# Patient Record
Sex: MALE | Race: WHITE | NOT HISPANIC OR LATINO | Employment: OTHER | URBAN - NONMETROPOLITAN AREA
[De-identification: names, ages, dates, MRNs, and addresses within clinical notes are randomized per-mention and may not be internally consistent; named-entity substitution may affect disease eponyms.]

---

## 2017-02-15 ENCOUNTER — APPOINTMENT (OUTPATIENT)
Dept: GENERAL RADIOLOGY | Facility: HOSPITAL | Age: 67
End: 2017-02-15
Attending: FAMILY MEDICINE

## 2017-02-15 PROCEDURE — 71020 HC CHEST PA AND LATERAL: CPT

## 2017-11-14 ENCOUNTER — TRANSCRIBE ORDERS (OUTPATIENT)
Dept: ADMINISTRATIVE | Facility: HOSPITAL | Age: 67
End: 2017-11-14

## 2017-11-14 DIAGNOSIS — R06.09 DYSPNEA ON EXERTION: Primary | ICD-10-CM

## 2017-11-20 ENCOUNTER — HOSPITAL ENCOUNTER (OUTPATIENT)
Dept: PULMONOLOGY | Facility: HOSPITAL | Age: 67
Discharge: HOME | End: 2017-11-20
Attending: FAMILY MEDICINE | Admitting: FAMILY MEDICINE

## 2017-11-20 DIAGNOSIS — R06.09 DYSPNEA ON EXERTION: ICD-10-CM

## 2017-11-20 LAB
ARTERIAL PATENCY WRIST A: POSITIVE
ATMOSPHERIC PRESS: 754 MMHG
BASE EXCESS BLDA CALC-SCNC: -2.2 MMOL/L (ref 0–2)
BDY SITE: ABNORMAL
BODY TEMPERATURE: 37 C
HCO3 BLDA-SCNC: 22.7 MMOL/L (ref 20–26)
Lab: ABNORMAL
MODALITY: ABNORMAL
PCO2 BLDA: 38.7 MM HG (ref 35–45)
PH BLDA: 7.38 PH UNITS (ref 7.35–7.45)
PO2 BLDA: 80.2 MM HG (ref 83–108)
SAO2 % BLDCOA: 93.9 % (ref 94–99)
VENTILATOR MODE: ABNORMAL

## 2017-11-20 PROCEDURE — 82803 BLOOD GASES ANY COMBINATION: CPT

## 2017-11-20 PROCEDURE — A9270 NON-COVERED ITEM OR SERVICE: HCPCS | Performed by: FAMILY MEDICINE

## 2017-11-20 PROCEDURE — 36600 WITHDRAWAL OF ARTERIAL BLOOD: CPT

## 2017-11-20 PROCEDURE — 94060 EVALUATION OF WHEEZING: CPT

## 2017-11-20 PROCEDURE — 63710000001 ALBUTEROL PER 1 MG: Performed by: FAMILY MEDICINE

## 2017-11-20 PROCEDURE — 94727 GAS DIL/WSHOT DETER LNG VOL: CPT

## 2017-11-20 PROCEDURE — 94729 DIFFUSING CAPACITY: CPT

## 2017-11-20 RX ORDER — ALBUTEROL SULFATE 2.5 MG/3ML
2.5 SOLUTION RESPIRATORY (INHALATION) ONCE
Status: COMPLETED | OUTPATIENT
Start: 2017-11-20 | End: 2017-11-20

## 2017-11-20 RX ADMIN — ALBUTEROL SULFATE 2.5 MG: 2.5 SOLUTION RESPIRATORY (INHALATION) at 14:15

## 2020-07-07 ENCOUNTER — TELEPHONE (OUTPATIENT)
Dept: UROLOGY | Age: 70
End: 2020-07-07

## 2020-07-07 NOTE — TELEPHONE ENCOUNTER
Rec referral from Dr. Curtis Camargo office. Called patient and he preferred it be sent over to [facility] as he does not like [place]. I have called and informed PCP's office of this today.

## 2020-07-22 ENCOUNTER — OFFICE VISIT (OUTPATIENT)
Dept: UROLOGY | Facility: CLINIC | Age: 70
End: 2020-07-22

## 2020-07-22 VITALS — HEIGHT: 70 IN | WEIGHT: 207.4 LBS | BODY MASS INDEX: 29.69 KG/M2 | TEMPERATURE: 97.4 F

## 2020-07-22 DIAGNOSIS — N13.8 BPH WITH URINARY OBSTRUCTION: ICD-10-CM

## 2020-07-22 DIAGNOSIS — N40.1 BPH WITH URINARY OBSTRUCTION: ICD-10-CM

## 2020-07-22 DIAGNOSIS — R97.20 ELEVATED PSA: Primary | ICD-10-CM

## 2020-07-22 LAB
BILIRUB BLD-MCNC: NEGATIVE MG/DL
CLARITY, POC: CLEAR
COLOR UR: YELLOW
GLUCOSE UR STRIP-MCNC: ABNORMAL MG/DL
KETONES UR QL: NEGATIVE
LEUKOCYTE EST, POC: NEGATIVE
NITRITE UR-MCNC: NEGATIVE MG/ML
PH UR: 5 [PH] (ref 5–8)
PROT UR STRIP-MCNC: NEGATIVE MG/DL
RBC # UR STRIP: NEGATIVE /UL
SP GR UR: 1.01 (ref 1–1.03)
UROBILINOGEN UR QL: NORMAL

## 2020-07-22 PROCEDURE — 99204 OFFICE O/P NEW MOD 45 MIN: CPT | Performed by: UROLOGY

## 2020-07-22 PROCEDURE — 81003 URINALYSIS AUTO W/O SCOPE: CPT | Performed by: UROLOGY

## 2020-07-22 RX ORDER — CEPHALEXIN 500 MG/1
500 CAPSULE ORAL 3 TIMES DAILY
Qty: 9 CAPSULE | Refills: 0 | Status: SHIPPED | OUTPATIENT
Start: 2020-07-22 | End: 2020-07-25

## 2020-07-31 ENCOUNTER — PROCEDURE VISIT (OUTPATIENT)
Dept: UROLOGY | Facility: CLINIC | Age: 70
End: 2020-07-31

## 2020-07-31 DIAGNOSIS — R97.20 ELEVATED PSA: Primary | ICD-10-CM

## 2020-07-31 PROCEDURE — 88342 IMHCHEM/IMCYTCHM 1ST ANTB: CPT | Performed by: UROLOGY

## 2020-07-31 PROCEDURE — 55700 PR PROSTATE NEEDLE BIOPSY ANY APPROACH: CPT | Performed by: UROLOGY

## 2020-07-31 PROCEDURE — G0416 PROSTATE BIOPSY, ANY MTHD: HCPCS | Performed by: UROLOGY

## 2020-07-31 PROCEDURE — 96372 THER/PROPH/DIAG INJ SC/IM: CPT | Performed by: UROLOGY

## 2020-07-31 PROCEDURE — 76942 ECHO GUIDE FOR BIOPSY: CPT | Performed by: UROLOGY

## 2020-07-31 RX ORDER — GENTAMICIN SULFATE 40 MG/ML
80 INJECTION, SOLUTION INTRAMUSCULAR; INTRAVENOUS ONCE
Status: COMPLETED | OUTPATIENT
Start: 2020-07-31 | End: 2020-07-31

## 2020-07-31 RX ADMIN — GENTAMICIN SULFATE 80 MG: 40 INJECTION, SOLUTION INTRAMUSCULAR; INTRAVENOUS at 07:34

## 2020-08-12 ENCOUNTER — TELEPHONE (OUTPATIENT)
Dept: UROLOGY | Facility: CLINIC | Age: 70
End: 2020-08-12

## 2020-08-19 ENCOUNTER — OFFICE VISIT (OUTPATIENT)
Dept: UROLOGY | Facility: CLINIC | Age: 70
End: 2020-08-19

## 2020-08-19 VITALS — BODY MASS INDEX: 29.69 KG/M2 | HEIGHT: 70 IN | TEMPERATURE: 97.4 F | WEIGHT: 207.4 LBS

## 2020-08-19 DIAGNOSIS — C61 PROSTATE CANCER (HCC): Primary | ICD-10-CM

## 2020-08-19 DIAGNOSIS — N13.8 BPH WITH URINARY OBSTRUCTION: ICD-10-CM

## 2020-08-19 DIAGNOSIS — N40.1 BPH WITH URINARY OBSTRUCTION: ICD-10-CM

## 2020-08-19 PROCEDURE — 99215 OFFICE O/P EST HI 40 MIN: CPT | Performed by: UROLOGY

## 2020-09-10 LAB
CYTO UR: NORMAL
LAB AP CASE REPORT: NORMAL
LAB AP INTRADEPARTMENTAL CONSULT: NORMAL
PATH REPORT.FINAL DX SPEC: NORMAL
PATH REPORT.GROSS SPEC: NORMAL

## 2021-04-01 ENCOUNTER — OFFICE VISIT (OUTPATIENT)
Dept: UROLOGY | Facility: CLINIC | Age: 71
End: 2021-04-01

## 2021-04-01 VITALS — TEMPERATURE: 97.8 F | WEIGHT: 211.4 LBS | BODY MASS INDEX: 30.26 KG/M2 | HEIGHT: 70 IN

## 2021-04-01 DIAGNOSIS — C61 PROSTATE CANCER (HCC): Primary | ICD-10-CM

## 2021-04-01 DIAGNOSIS — N40.1 BPH WITH URINARY OBSTRUCTION: ICD-10-CM

## 2021-04-01 DIAGNOSIS — N13.8 BPH WITH URINARY OBSTRUCTION: ICD-10-CM

## 2021-04-01 DIAGNOSIS — N52.9 IMPOTENCE OF ORGANIC ORIGIN: ICD-10-CM

## 2021-04-01 LAB
BILIRUB BLD-MCNC: NEGATIVE MG/DL
CLARITY, POC: CLEAR
COLOR UR: YELLOW
GLUCOSE UR STRIP-MCNC: NEGATIVE MG/DL
KETONES UR QL: NEGATIVE
LEUKOCYTE EST, POC: NEGATIVE
NITRITE UR-MCNC: NEGATIVE MG/ML
PH UR: 6 [PH] (ref 5–8)
PROT UR STRIP-MCNC: NEGATIVE MG/DL
RBC # UR STRIP: NEGATIVE /UL
SP GR UR: 1.02 (ref 1–1.03)
UROBILINOGEN UR QL: NORMAL

## 2021-04-01 PROCEDURE — 99214 OFFICE O/P EST MOD 30 MIN: CPT | Performed by: UROLOGY

## 2021-04-01 PROCEDURE — 81003 URINALYSIS AUTO W/O SCOPE: CPT | Performed by: UROLOGY

## 2021-04-01 RX ORDER — ALPRAZOLAM 1 MG/1
1 TABLET ORAL NIGHTLY PRN
Qty: 1 TABLET | Refills: 0 | Status: SHIPPED | OUTPATIENT
Start: 2021-04-01 | End: 2021-05-11

## 2021-04-14 ENCOUNTER — HOSPITAL ENCOUNTER (OUTPATIENT)
Dept: MRI IMAGING | Facility: HOSPITAL | Age: 71
Discharge: HOME | End: 2021-04-14
Admitting: UROLOGY

## 2021-04-14 DIAGNOSIS — C61 PROSTATE CANCER (HCC): ICD-10-CM

## 2021-04-14 LAB — CREAT BLDA-MCNC: 0.5 MG/DL (ref 0.6–1.3)

## 2021-04-14 PROCEDURE — 82565 ASSAY OF CREATININE: CPT

## 2021-04-14 PROCEDURE — 0 GADOBENATE DIMEGLUMINE 529 MG/ML SOLUTION: Performed by: UROLOGY

## 2021-04-14 PROCEDURE — A9577 INJ MULTIHANCE: HCPCS | Performed by: UROLOGY

## 2021-04-14 PROCEDURE — 72197 MRI PELVIS W/O & W/DYE: CPT

## 2021-04-14 RX ADMIN — GADOBENATE DIMEGLUMINE 20 ML: 529 INJECTION, SOLUTION INTRAVENOUS at 17:08

## 2021-04-21 ENCOUNTER — OFFICE VISIT (OUTPATIENT)
Dept: UROLOGY | Facility: CLINIC | Age: 71
End: 2021-04-21

## 2021-04-21 VITALS — HEIGHT: 70 IN | BODY MASS INDEX: 31.67 KG/M2 | TEMPERATURE: 97.4 F | WEIGHT: 221.2 LBS

## 2021-04-21 DIAGNOSIS — N40.1 BPH WITH URINARY OBSTRUCTION: ICD-10-CM

## 2021-04-21 DIAGNOSIS — C61 PROSTATE CANCER (HCC): Primary | ICD-10-CM

## 2021-04-21 DIAGNOSIS — N52.9 IMPOTENCE OF ORGANIC ORIGIN: ICD-10-CM

## 2021-04-21 DIAGNOSIS — N13.8 BPH WITH URINARY OBSTRUCTION: ICD-10-CM

## 2021-04-21 PROCEDURE — 99214 OFFICE O/P EST MOD 30 MIN: CPT | Performed by: UROLOGY

## 2021-04-21 RX ORDER — SODIUM CHLORIDE 9 MG/ML
100 INJECTION, SOLUTION INTRAVENOUS CONTINUOUS
Status: CANCELLED | OUTPATIENT
Start: 2021-04-21

## 2021-04-22 ENCOUNTER — TELEPHONE (OUTPATIENT)
Dept: UROLOGY | Facility: CLINIC | Age: 71
End: 2021-04-22

## 2021-05-07 ENCOUNTER — TRANSCRIBE ORDERS (OUTPATIENT)
Dept: ADMINISTRATIVE | Facility: HOSPITAL | Age: 71
End: 2021-05-07

## 2021-05-07 DIAGNOSIS — Z11.59 SCREENING FOR VIRAL DISEASE: Primary | ICD-10-CM

## 2021-05-11 ENCOUNTER — PRE-ADMISSION TESTING (OUTPATIENT)
Dept: PREADMISSION TESTING | Facility: HOSPITAL | Age: 71
End: 2021-05-11

## 2021-05-11 ENCOUNTER — LAB (OUTPATIENT)
Dept: LAB | Facility: HOSPITAL | Age: 71
End: 2021-05-11

## 2021-05-11 VITALS
BODY MASS INDEX: 31.25 KG/M2 | HEART RATE: 79 BPM | RESPIRATION RATE: 18 BRPM | DIASTOLIC BLOOD PRESSURE: 82 MMHG | OXYGEN SATURATION: 99 % | WEIGHT: 218.26 LBS | HEIGHT: 70 IN | SYSTOLIC BLOOD PRESSURE: 159 MMHG

## 2021-05-11 LAB
ANION GAP SERPL CALCULATED.3IONS-SCNC: 7 MMOL/L (ref 5–15)
BUN SERPL-MCNC: 12 MG/DL (ref 8–23)
BUN/CREAT SERPL: 25 (ref 7–25)
CALCIUM SPEC-SCNC: 9.2 MG/DL (ref 8.6–10.5)
CHLORIDE SERPL-SCNC: 106 MMOL/L (ref 98–107)
CO2 SERPL-SCNC: 29 MMOL/L (ref 22–29)
CREAT SERPL-MCNC: 0.48 MG/DL (ref 0.76–1.27)
DEPRECATED RDW RBC AUTO: 42.7 FL (ref 37–54)
ERYTHROCYTE [DISTWIDTH] IN BLOOD BY AUTOMATED COUNT: 14.6 % (ref 12.3–15.4)
GFR SERPL CREATININE-BSD FRML MDRD: >150 ML/MIN/1.73
GLUCOSE SERPL-MCNC: 110 MG/DL (ref 65–99)
HCT VFR BLD AUTO: 38.1 % (ref 37.5–51)
HGB BLD-MCNC: 11.5 G/DL (ref 13–17.7)
MCH RBC QN AUTO: 24.6 PG (ref 26.6–33)
MCHC RBC AUTO-ENTMCNC: 30.2 G/DL (ref 31.5–35.7)
MCV RBC AUTO: 81.4 FL (ref 79–97)
PLATELET # BLD AUTO: 313 10*3/MM3 (ref 140–450)
PMV BLD AUTO: 9.9 FL (ref 6–12)
POTASSIUM SERPL-SCNC: 4.7 MMOL/L (ref 3.5–5.2)
RBC # BLD AUTO: 4.68 10*6/MM3 (ref 4.14–5.8)
SARS-COV-2 ORF1AB RESP QL NAA+PROBE: NOT DETECTED
SODIUM SERPL-SCNC: 142 MMOL/L (ref 136–145)
WBC # BLD AUTO: 5.84 10*3/MM3 (ref 3.4–10.8)

## 2021-05-11 PROCEDURE — 85027 COMPLETE CBC AUTOMATED: CPT

## 2021-05-11 PROCEDURE — C9803 HOPD COVID-19 SPEC COLLECT: HCPCS | Performed by: UROLOGY

## 2021-05-11 PROCEDURE — 93005 ELECTROCARDIOGRAM TRACING: CPT

## 2021-05-11 PROCEDURE — U0004 COV-19 TEST NON-CDC HGH THRU: HCPCS | Performed by: UROLOGY

## 2021-05-11 PROCEDURE — 80048 BASIC METABOLIC PNL TOTAL CA: CPT

## 2021-05-11 PROCEDURE — 36415 COLL VENOUS BLD VENIPUNCTURE: CPT

## 2021-05-11 PROCEDURE — U0005 INFEC AGEN DETEC AMPLI PROBE: HCPCS | Performed by: UROLOGY

## 2021-05-11 PROCEDURE — 93010 ELECTROCARDIOGRAM REPORT: CPT | Performed by: INTERNAL MEDICINE

## 2021-05-11 RX ORDER — ACETAMINOPHEN,DIPHENHYDRAMINE HCL 500; 25 MG/1; MG/1
1 TABLET, FILM COATED ORAL NIGHTLY PRN
COMMUNITY

## 2021-05-12 LAB
QT INTERVAL: 378 MS
QTC INTERVAL: 427 MS

## 2021-05-13 ENCOUNTER — ANESTHESIA (OUTPATIENT)
Dept: PERIOP | Facility: HOSPITAL | Age: 71
End: 2021-05-13

## 2021-05-13 ENCOUNTER — HOSPITAL ENCOUNTER (OUTPATIENT)
Facility: HOSPITAL | Age: 71
Setting detail: HOSPITAL OUTPATIENT SURGERY
Discharge: HOME | End: 2021-05-13
Attending: UROLOGY | Admitting: UROLOGY

## 2021-05-13 ENCOUNTER — ANESTHESIA EVENT (OUTPATIENT)
Dept: PERIOP | Facility: HOSPITAL | Age: 71
End: 2021-05-13

## 2021-05-13 VITALS
HEART RATE: 73 BPM | DIASTOLIC BLOOD PRESSURE: 83 MMHG | OXYGEN SATURATION: 95 % | RESPIRATION RATE: 16 BRPM | TEMPERATURE: 98.1 F | SYSTOLIC BLOOD PRESSURE: 155 MMHG

## 2021-05-13 DIAGNOSIS — C61 PROSTATE CANCER (HCC): ICD-10-CM

## 2021-05-13 LAB
GLUCOSE BLDC GLUCOMTR-MCNC: 73 MG/DL (ref 70–130)
GLUCOSE BLDC GLUCOMTR-MCNC: 94 MG/DL (ref 70–130)

## 2021-05-13 PROCEDURE — 25010000002 PROPOFOL 10 MG/ML EMULSION: Performed by: NURSE ANESTHETIST, CERTIFIED REGISTERED

## 2021-05-13 PROCEDURE — 82962 GLUCOSE BLOOD TEST: CPT

## 2021-05-13 PROCEDURE — 25010000002 ONDANSETRON PER 1 MG: Performed by: NURSE ANESTHETIST, CERTIFIED REGISTERED

## 2021-05-13 PROCEDURE — 25010000002 CEFAZOLIN PER 500 MG: Performed by: UROLOGY

## 2021-05-13 PROCEDURE — 25010000002 GENTAMICIN PER 80 MG: Performed by: UROLOGY

## 2021-05-13 PROCEDURE — 25010000002 DEXAMETHASONE PER 1 MG: Performed by: ANESTHESIOLOGY

## 2021-05-13 PROCEDURE — 25010000002 FENTANYL CITRATE (PF) 100 MCG/2ML SOLUTION: Performed by: NURSE ANESTHETIST, CERTIFIED REGISTERED

## 2021-05-13 PROCEDURE — 55700 PR PROSTATE NEEDLE BIOPSY ANY APPROACH: CPT | Performed by: UROLOGY

## 2021-05-13 PROCEDURE — 76942 ECHO GUIDE FOR BIOPSY: CPT | Performed by: UROLOGY

## 2021-05-13 PROCEDURE — G0416 PROSTATE BIOPSY, ANY MTHD: HCPCS | Performed by: UROLOGY

## 2021-05-13 RX ORDER — LIDOCAINE HYDROCHLORIDE 20 MG/ML
INJECTION, SOLUTION EPIDURAL; INFILTRATION; INTRACAUDAL; PERINEURAL AS NEEDED
Status: DISCONTINUED | OUTPATIENT
Start: 2021-05-13 | End: 2021-05-13 | Stop reason: SURG

## 2021-05-13 RX ORDER — ONDANSETRON 4 MG/1
4 TABLET, FILM COATED ORAL ONCE AS NEEDED
Status: DISCONTINUED | OUTPATIENT
Start: 2021-05-13 | End: 2021-05-13 | Stop reason: HOSPADM

## 2021-05-13 RX ORDER — DEXAMETHASONE SODIUM PHOSPHATE 4 MG/ML
4 INJECTION, SOLUTION INTRA-ARTICULAR; INTRALESIONAL; INTRAMUSCULAR; INTRAVENOUS; SOFT TISSUE ONCE AS NEEDED
Status: COMPLETED | OUTPATIENT
Start: 2021-05-13 | End: 2021-05-13

## 2021-05-13 RX ORDER — MIDAZOLAM HYDROCHLORIDE 1 MG/ML
0.5 INJECTION INTRAMUSCULAR; INTRAVENOUS
Status: DISCONTINUED | OUTPATIENT
Start: 2021-05-13 | End: 2021-05-13 | Stop reason: HOSPADM

## 2021-05-13 RX ORDER — LIDOCAINE HYDROCHLORIDE 10 MG/ML
0.5 INJECTION, SOLUTION EPIDURAL; INFILTRATION; INTRACAUDAL; PERINEURAL ONCE AS NEEDED
Status: DISCONTINUED | OUTPATIENT
Start: 2021-05-13 | End: 2021-05-13 | Stop reason: HOSPADM

## 2021-05-13 RX ORDER — ONDANSETRON 2 MG/ML
INJECTION INTRAMUSCULAR; INTRAVENOUS AS NEEDED
Status: DISCONTINUED | OUTPATIENT
Start: 2021-05-13 | End: 2021-05-13 | Stop reason: SURG

## 2021-05-13 RX ORDER — FENTANYL CITRATE 50 UG/ML
INJECTION, SOLUTION INTRAMUSCULAR; INTRAVENOUS AS NEEDED
Status: DISCONTINUED | OUTPATIENT
Start: 2021-05-13 | End: 2021-05-13 | Stop reason: SURG

## 2021-05-13 RX ORDER — HYDROCODONE BITARTRATE AND ACETAMINOPHEN 7.5; 325 MG/1; MG/1
1 TABLET ORAL ONCE AS NEEDED
Status: DISCONTINUED | OUTPATIENT
Start: 2021-05-13 | End: 2021-05-13 | Stop reason: HOSPADM

## 2021-05-13 RX ORDER — IBUPROFEN 600 MG/1
600 TABLET ORAL ONCE AS NEEDED
Status: DISCONTINUED | OUTPATIENT
Start: 2021-05-13 | End: 2021-05-13 | Stop reason: HOSPADM

## 2021-05-13 RX ORDER — SODIUM CHLORIDE 0.9 % (FLUSH) 0.9 %
3-10 SYRINGE (ML) INJECTION AS NEEDED
Status: DISCONTINUED | OUTPATIENT
Start: 2021-05-13 | End: 2021-05-13 | Stop reason: HOSPADM

## 2021-05-13 RX ORDER — LABETALOL HYDROCHLORIDE 5 MG/ML
5 INJECTION, SOLUTION INTRAVENOUS
Status: DISCONTINUED | OUTPATIENT
Start: 2021-05-13 | End: 2021-05-13 | Stop reason: HOSPADM

## 2021-05-13 RX ORDER — BUPIVACAINE HCL/0.9 % NACL/PF 0.1 %
2 PLASTIC BAG, INJECTION (ML) EPIDURAL ONCE
Status: COMPLETED | OUTPATIENT
Start: 2021-05-13 | End: 2021-05-13

## 2021-05-13 RX ORDER — CEPHALEXIN 500 MG/1
500 CAPSULE ORAL 3 TIMES DAILY
Qty: 9 CAPSULE | Refills: 0 | Status: SHIPPED | OUTPATIENT
Start: 2021-05-13 | End: 2021-05-16

## 2021-05-13 RX ORDER — PROPOFOL 10 MG/ML
VIAL (ML) INTRAVENOUS AS NEEDED
Status: DISCONTINUED | OUTPATIENT
Start: 2021-05-13 | End: 2021-05-13 | Stop reason: SURG

## 2021-05-13 RX ORDER — FENTANYL CITRATE 50 UG/ML
25 INJECTION, SOLUTION INTRAMUSCULAR; INTRAVENOUS
Status: DISCONTINUED | OUTPATIENT
Start: 2021-05-13 | End: 2021-05-13 | Stop reason: HOSPADM

## 2021-05-13 RX ORDER — SODIUM CHLORIDE 0.9 % (FLUSH) 0.9 %
3 SYRINGE (ML) INJECTION EVERY 12 HOURS SCHEDULED
Status: DISCONTINUED | OUTPATIENT
Start: 2021-05-13 | End: 2021-05-13 | Stop reason: HOSPADM

## 2021-05-13 RX ORDER — SODIUM CHLORIDE, SODIUM LACTATE, POTASSIUM CHLORIDE, CALCIUM CHLORIDE 600; 310; 30; 20 MG/100ML; MG/100ML; MG/100ML; MG/100ML
1000 INJECTION, SOLUTION INTRAVENOUS CONTINUOUS
Status: DISCONTINUED | OUTPATIENT
Start: 2021-05-13 | End: 2021-05-13 | Stop reason: HOSPADM

## 2021-05-13 RX ORDER — OXYCODONE AND ACETAMINOPHEN 7.5; 325 MG/1; MG/1
1 TABLET ORAL EVERY 4 HOURS PRN
Status: DISCONTINUED | OUTPATIENT
Start: 2021-05-13 | End: 2021-05-13 | Stop reason: HOSPADM

## 2021-05-13 RX ORDER — SODIUM CHLORIDE 9 MG/ML
100 INJECTION, SOLUTION INTRAVENOUS CONTINUOUS
Status: DISCONTINUED | OUTPATIENT
Start: 2021-05-13 | End: 2021-05-13 | Stop reason: HOSPADM

## 2021-05-13 RX ORDER — ACETAMINOPHEN 500 MG
1000 TABLET ORAL ONCE
Status: COMPLETED | OUTPATIENT
Start: 2021-05-13 | End: 2021-05-13

## 2021-05-13 RX ORDER — NALOXONE HCL 0.4 MG/ML
0.4 VIAL (ML) INJECTION AS NEEDED
Status: DISCONTINUED | OUTPATIENT
Start: 2021-05-13 | End: 2021-05-13 | Stop reason: HOSPADM

## 2021-05-13 RX ORDER — GENTAMICIN SULFATE 80 MG/100ML
80 INJECTION, SOLUTION INTRAVENOUS ONCE
Status: COMPLETED | OUTPATIENT
Start: 2021-05-13 | End: 2021-05-13

## 2021-05-13 RX ORDER — SODIUM CHLORIDE 0.9 % (FLUSH) 0.9 %
3 SYRINGE (ML) INJECTION AS NEEDED
Status: DISCONTINUED | OUTPATIENT
Start: 2021-05-13 | End: 2021-05-13 | Stop reason: HOSPADM

## 2021-05-13 RX ORDER — ONDANSETRON 2 MG/ML
4 INJECTION INTRAMUSCULAR; INTRAVENOUS ONCE AS NEEDED
Status: DISCONTINUED | OUTPATIENT
Start: 2021-05-13 | End: 2021-05-13 | Stop reason: HOSPADM

## 2021-05-13 RX ORDER — SODIUM CHLORIDE, SODIUM LACTATE, POTASSIUM CHLORIDE, CALCIUM CHLORIDE 600; 310; 30; 20 MG/100ML; MG/100ML; MG/100ML; MG/100ML
100 INJECTION, SOLUTION INTRAVENOUS CONTINUOUS
Status: DISCONTINUED | OUTPATIENT
Start: 2021-05-13 | End: 2021-05-13 | Stop reason: HOSPADM

## 2021-05-13 RX ORDER — FLUMAZENIL 0.1 MG/ML
0.2 INJECTION INTRAVENOUS AS NEEDED
Status: DISCONTINUED | OUTPATIENT
Start: 2021-05-13 | End: 2021-05-13 | Stop reason: HOSPADM

## 2021-05-13 RX ORDER — OXYCODONE HYDROCHLORIDE AND ACETAMINOPHEN 5; 325 MG/1; MG/1
1 TABLET ORAL ONCE AS NEEDED
Status: DISCONTINUED | OUTPATIENT
Start: 2021-05-13 | End: 2021-05-13 | Stop reason: HOSPADM

## 2021-05-13 RX ORDER — FAMOTIDINE 10 MG/ML
20 INJECTION, SOLUTION INTRAVENOUS
Status: COMPLETED | OUTPATIENT
Start: 2021-05-13 | End: 2021-05-13

## 2021-05-13 RX ADMIN — FAMOTIDINE 20 MG: 10 INJECTION INTRAVENOUS at 09:56

## 2021-05-13 RX ADMIN — CEFAZOLIN SODIUM 2 G: 10 INJECTION, POWDER, FOR SOLUTION INTRAVENOUS at 09:47

## 2021-05-13 RX ADMIN — ACETAMINOPHEN 1000 MG: 500 TABLET, FILM COATED ORAL at 09:55

## 2021-05-13 RX ADMIN — FENTANYL CITRATE 100 MCG: 50 INJECTION, SOLUTION INTRAMUSCULAR; INTRAVENOUS at 11:03

## 2021-05-13 RX ADMIN — PROPOFOL 200 MG: 10 INJECTION, EMULSION INTRAVENOUS at 11:03

## 2021-05-13 RX ADMIN — GENTAMICIN SULFATE 80 MG: 80 INJECTION, SOLUTION INTRAVENOUS at 09:47

## 2021-05-13 RX ADMIN — ONDANSETRON 4 MG: 2 INJECTION INTRAMUSCULAR; INTRAVENOUS at 11:13

## 2021-05-13 RX ADMIN — LIDOCAINE HYDROCHLORIDE 100 MG: 20 INJECTION, SOLUTION EPIDURAL; INFILTRATION; INTRACAUDAL; PERINEURAL at 11:03

## 2021-05-13 RX ADMIN — DEXAMETHASONE SODIUM PHOSPHATE 4 MG: 4 INJECTION, SOLUTION INTRA-ARTICULAR; INTRALESIONAL; INTRAMUSCULAR; INTRAVENOUS; SOFT TISSUE at 09:56

## 2021-05-13 RX ADMIN — SODIUM CHLORIDE, POTASSIUM CHLORIDE, SODIUM LACTATE AND CALCIUM CHLORIDE 1000 ML: 600; 310; 30; 20 INJECTION, SOLUTION INTRAVENOUS at 07:27

## 2021-05-14 LAB
LAB AP CASE REPORT: NORMAL
PATH REPORT.FINAL DX SPEC: NORMAL
PATH REPORT.GROSS SPEC: NORMAL

## 2021-05-20 ENCOUNTER — OFFICE VISIT (OUTPATIENT)
Dept: UROLOGY | Facility: CLINIC | Age: 71
End: 2021-05-20

## 2021-05-20 VITALS — BODY MASS INDEX: 31.3 KG/M2 | TEMPERATURE: 96.8 F | WEIGHT: 218.6 LBS | HEIGHT: 70 IN

## 2021-05-20 DIAGNOSIS — N13.8 BPH WITH URINARY OBSTRUCTION: ICD-10-CM

## 2021-05-20 DIAGNOSIS — N40.1 BPH WITH URINARY OBSTRUCTION: ICD-10-CM

## 2021-05-20 DIAGNOSIS — N52.9 IMPOTENCE OF ORGANIC ORIGIN: ICD-10-CM

## 2021-05-20 DIAGNOSIS — C61 PROSTATE CANCER (HCC): Primary | ICD-10-CM

## 2021-05-20 PROCEDURE — 99214 OFFICE O/P EST MOD 30 MIN: CPT | Performed by: UROLOGY

## 2021-06-04 ENCOUNTER — HOSPITAL ENCOUNTER (OUTPATIENT)
Dept: RADIATION ONCOLOGY | Facility: HOSPITAL | Age: 71
Setting detail: RADIATION/ONCOLOGY SERIES
End: 2021-06-04

## 2021-06-07 PROBLEM — Z78.9 NON-SMOKER: Status: ACTIVE | Noted: 2021-06-07

## 2021-06-09 ENCOUNTER — DOCUMENTATION (OUTPATIENT)
Dept: RADIATION ONCOLOGY | Facility: HOSPITAL | Age: 71
End: 2021-06-09

## 2021-06-09 ENCOUNTER — CONSULT (OUTPATIENT)
Dept: RADIATION ONCOLOGY | Facility: HOSPITAL | Age: 71
End: 2021-06-09

## 2021-06-09 VITALS
HEIGHT: 70 IN | RESPIRATION RATE: 18 BRPM | WEIGHT: 221 LBS | OXYGEN SATURATION: 98 % | DIASTOLIC BLOOD PRESSURE: 68 MMHG | BODY MASS INDEX: 31.64 KG/M2 | HEART RATE: 83 BPM | SYSTOLIC BLOOD PRESSURE: 149 MMHG

## 2021-06-09 DIAGNOSIS — Z78.9 NON-SMOKER: ICD-10-CM

## 2021-06-09 DIAGNOSIS — C61 CANCER OF PROSTATE W/LOW RECURRENCE RISK (T1-2A, GLEASON<7 & PSA<10) (HCC): Primary | ICD-10-CM

## 2021-06-09 DIAGNOSIS — R35.0 URINARY FREQUENCY: ICD-10-CM

## 2021-06-09 DIAGNOSIS — Z79.82 ASPIRIN LONG-TERM USE: ICD-10-CM

## 2021-06-09 PROCEDURE — G0463 HOSPITAL OUTPT CLINIC VISIT: HCPCS | Performed by: RADIOLOGY

## 2021-06-09 RX ORDER — FLUOXETINE HYDROCHLORIDE 20 MG/1
1 CAPSULE ORAL DAILY
COMMUNITY
Start: 2021-06-08

## 2021-06-15 ENCOUNTER — PREP FOR SURGERY (OUTPATIENT)
Dept: OTHER | Facility: HOSPITAL | Age: 71
End: 2021-06-15

## 2021-06-15 ENCOUNTER — TELEPHONE (OUTPATIENT)
Dept: UROLOGY | Facility: CLINIC | Age: 71
End: 2021-06-15

## 2021-06-15 DIAGNOSIS — C61 PROSTATE CANCER (HCC): Primary | ICD-10-CM

## 2021-06-15 RX ORDER — BUPIVACAINE HCL/0.9 % NACL/PF 0.1 %
2 PLASTIC BAG, INJECTION (ML) EPIDURAL ONCE
Status: CANCELLED | OUTPATIENT
Start: 2021-06-15 | End: 2021-06-15

## 2021-06-15 RX ORDER — SODIUM CHLORIDE 0.9 % (FLUSH) 0.9 %
1-10 SYRINGE (ML) INJECTION AS NEEDED
Status: CANCELLED | OUTPATIENT
Start: 2021-06-15

## 2021-06-15 RX ORDER — SODIUM CHLORIDE 9 MG/ML
100 INJECTION, SOLUTION INTRAVENOUS CONTINUOUS
Status: CANCELLED | OUTPATIENT
Start: 2021-06-15

## 2021-06-15 RX ORDER — SODIUM CHLORIDE 0.9 % (FLUSH) 0.9 %
3 SYRINGE (ML) INJECTION EVERY 12 HOURS SCHEDULED
Status: CANCELLED | OUTPATIENT
Start: 2021-06-15

## 2021-06-24 ENCOUNTER — TRANSCRIBE ORDERS (OUTPATIENT)
Dept: ADMINISTRATIVE | Facility: HOSPITAL | Age: 71
End: 2021-06-24

## 2021-06-24 ENCOUNTER — PRE-ADMISSION TESTING (OUTPATIENT)
Dept: PREADMISSION TESTING | Facility: HOSPITAL | Age: 71
End: 2021-06-24

## 2021-06-24 VITALS
BODY MASS INDEX: 31.28 KG/M2 | DIASTOLIC BLOOD PRESSURE: 69 MMHG | WEIGHT: 218.48 LBS | HEIGHT: 70 IN | OXYGEN SATURATION: 100 % | SYSTOLIC BLOOD PRESSURE: 148 MMHG | RESPIRATION RATE: 20 BRPM | HEART RATE: 82 BPM

## 2021-06-24 DIAGNOSIS — Z11.59 SCREENING FOR VIRAL DISEASE: Primary | ICD-10-CM

## 2021-06-24 LAB
ANION GAP SERPL CALCULATED.3IONS-SCNC: 9 MMOL/L (ref 5–15)
BUN SERPL-MCNC: 14 MG/DL (ref 8–23)
BUN/CREAT SERPL: 32.6 (ref 7–25)
CALCIUM SPEC-SCNC: 9.5 MG/DL (ref 8.6–10.5)
CHLORIDE SERPL-SCNC: 104 MMOL/L (ref 98–107)
CO2 SERPL-SCNC: 29 MMOL/L (ref 22–29)
CREAT SERPL-MCNC: 0.43 MG/DL (ref 0.76–1.27)
DEPRECATED RDW RBC AUTO: 43.3 FL (ref 37–54)
ERYTHROCYTE [DISTWIDTH] IN BLOOD BY AUTOMATED COUNT: 14.8 % (ref 12.3–15.4)
GFR SERPL CREATININE-BSD FRML MDRD: >150 ML/MIN/1.73
GLUCOSE SERPL-MCNC: 106 MG/DL (ref 65–99)
HCT VFR BLD AUTO: 39.2 % (ref 37.5–51)
HGB BLD-MCNC: 11.9 G/DL (ref 13–17.7)
MCH RBC QN AUTO: 24.3 PG (ref 26.6–33)
MCHC RBC AUTO-ENTMCNC: 30.4 G/DL (ref 31.5–35.7)
MCV RBC AUTO: 80.2 FL (ref 79–97)
PLATELET # BLD AUTO: 349 10*3/MM3 (ref 140–450)
PMV BLD AUTO: 9.8 FL (ref 6–12)
POTASSIUM SERPL-SCNC: 4.2 MMOL/L (ref 3.5–5.2)
RBC # BLD AUTO: 4.89 10*6/MM3 (ref 4.14–5.8)
SODIUM SERPL-SCNC: 142 MMOL/L (ref 136–145)
WBC # BLD AUTO: 7.06 10*3/MM3 (ref 3.4–10.8)

## 2021-06-24 PROCEDURE — 80048 BASIC METABOLIC PNL TOTAL CA: CPT

## 2021-06-24 PROCEDURE — 85027 COMPLETE CBC AUTOMATED: CPT

## 2021-06-24 PROCEDURE — 36415 COLL VENOUS BLD VENIPUNCTURE: CPT

## 2021-06-28 ENCOUNTER — LAB (OUTPATIENT)
Dept: LAB | Facility: HOSPITAL | Age: 71
End: 2021-06-28

## 2021-06-28 LAB — SARS-COV-2 ORF1AB RESP QL NAA+PROBE: NOT DETECTED

## 2021-06-28 PROCEDURE — C9803 HOPD COVID-19 SPEC COLLECT: HCPCS | Performed by: UROLOGY

## 2021-06-28 PROCEDURE — U0004 COV-19 TEST NON-CDC HGH THRU: HCPCS | Performed by: UROLOGY

## 2021-06-28 PROCEDURE — U0005 INFEC AGEN DETEC AMPLI PROBE: HCPCS | Performed by: UROLOGY

## 2021-07-01 ENCOUNTER — ANESTHESIA (OUTPATIENT)
Dept: PERIOP | Facility: HOSPITAL | Age: 71
End: 2021-07-01

## 2021-07-01 ENCOUNTER — HOSPITAL ENCOUNTER (OUTPATIENT)
Facility: HOSPITAL | Age: 71
Setting detail: HOSPITAL OUTPATIENT SURGERY
Discharge: HOME | End: 2021-07-01
Attending: UROLOGY | Admitting: UROLOGY

## 2021-07-01 ENCOUNTER — ANESTHESIA EVENT (OUTPATIENT)
Dept: PERIOP | Facility: HOSPITAL | Age: 71
End: 2021-07-01

## 2021-07-01 VITALS
RESPIRATION RATE: 16 BRPM | DIASTOLIC BLOOD PRESSURE: 72 MMHG | OXYGEN SATURATION: 94 % | SYSTOLIC BLOOD PRESSURE: 138 MMHG | TEMPERATURE: 97.4 F | HEART RATE: 70 BPM

## 2021-07-01 DIAGNOSIS — C61 PROSTATE CANCER (HCC): ICD-10-CM

## 2021-07-01 LAB
GLUCOSE BLDC GLUCOMTR-MCNC: 76 MG/DL (ref 70–130)
GLUCOSE BLDC GLUCOMTR-MCNC: 82 MG/DL (ref 70–130)

## 2021-07-01 PROCEDURE — 55876 PLACE RT DEVICE/MARKER PROS: CPT | Performed by: UROLOGY

## 2021-07-01 PROCEDURE — C1878 MATRL FOR VOCAL CORD: HCPCS | Performed by: UROLOGY

## 2021-07-01 PROCEDURE — 25010000002 CEFAZOLIN PER 500 MG: Performed by: UROLOGY

## 2021-07-01 PROCEDURE — 25010000002 DEXAMETHASONE PER 1 MG: Performed by: NURSE ANESTHETIST, CERTIFIED REGISTERED

## 2021-07-01 PROCEDURE — 25010000002 ONDANSETRON PER 1 MG: Performed by: NURSE ANESTHETIST, CERTIFIED REGISTERED

## 2021-07-01 PROCEDURE — 25010000002 PHENYLEPHRINE HCL 0.8 MG/10ML SOLUTION PREFILLED SYRINGE: Performed by: NURSE ANESTHETIST, CERTIFIED REGISTERED

## 2021-07-01 PROCEDURE — A4648 IMPLANTABLE TISSUE MARKER: HCPCS | Performed by: UROLOGY

## 2021-07-01 PROCEDURE — 25010000002 PROPOFOL 10 MG/ML EMULSION: Performed by: NURSE ANESTHETIST, CERTIFIED REGISTERED

## 2021-07-01 PROCEDURE — 82962 GLUCOSE BLOOD TEST: CPT

## 2021-07-01 PROCEDURE — 55874 TPRNL PLMT BIODEGRDABL MATRL: CPT | Performed by: UROLOGY

## 2021-07-01 PROCEDURE — 25010000002 FENTANYL CITRATE (PF) 100 MCG/2ML SOLUTION: Performed by: NURSE ANESTHETIST, CERTIFIED REGISTERED

## 2021-07-01 DEVICE — SYS HYDROGEL SPACEOAR VUE 10ML: Type: IMPLANTABLE DEVICE | Site: PROSTATE | Status: FUNCTIONAL

## 2021-07-01 DEVICE — IMPLANTABLE DEVICE
Type: IMPLANTABLE DEVICE | Site: PROSTATE | Status: FUNCTIONAL
Brand: GOLD FIDUCIAL

## 2021-07-01 RX ORDER — LIDOCAINE HYDROCHLORIDE 40 MG/ML
SOLUTION TOPICAL AS NEEDED
Status: DISCONTINUED | OUTPATIENT
Start: 2021-07-01 | End: 2021-07-01 | Stop reason: SURG

## 2021-07-01 RX ORDER — FENTANYL CITRATE 50 UG/ML
INJECTION, SOLUTION INTRAMUSCULAR; INTRAVENOUS AS NEEDED
Status: DISCONTINUED | OUTPATIENT
Start: 2021-07-01 | End: 2021-07-01 | Stop reason: SURG

## 2021-07-01 RX ORDER — IBUPROFEN 600 MG/1
600 TABLET ORAL ONCE AS NEEDED
Status: DISCONTINUED | OUTPATIENT
Start: 2021-07-01 | End: 2021-07-01 | Stop reason: HOSPADM

## 2021-07-01 RX ORDER — OXYCODONE AND ACETAMINOPHEN 10; 325 MG/1; MG/1
1 TABLET ORAL ONCE AS NEEDED
Status: DISCONTINUED | OUTPATIENT
Start: 2021-07-01 | End: 2021-07-01 | Stop reason: HOSPADM

## 2021-07-01 RX ORDER — ROCURONIUM BROMIDE 10 MG/ML
INJECTION, SOLUTION INTRAVENOUS AS NEEDED
Status: DISCONTINUED | OUTPATIENT
Start: 2021-07-01 | End: 2021-07-01 | Stop reason: SURG

## 2021-07-01 RX ORDER — LIDOCAINE HYDROCHLORIDE 20 MG/ML
INJECTION, SOLUTION EPIDURAL; INFILTRATION; INTRACAUDAL; PERINEURAL AS NEEDED
Status: DISCONTINUED | OUTPATIENT
Start: 2021-07-01 | End: 2021-07-01 | Stop reason: SURG

## 2021-07-01 RX ORDER — FENTANYL CITRATE 50 UG/ML
25 INJECTION, SOLUTION INTRAMUSCULAR; INTRAVENOUS
Status: DISCONTINUED | OUTPATIENT
Start: 2021-07-01 | End: 2021-07-01 | Stop reason: HOSPADM

## 2021-07-01 RX ORDER — NALOXONE HCL 0.4 MG/ML
0.4 VIAL (ML) INJECTION AS NEEDED
Status: DISCONTINUED | OUTPATIENT
Start: 2021-07-01 | End: 2021-07-01 | Stop reason: HOSPADM

## 2021-07-01 RX ORDER — DEXAMETHASONE SODIUM PHOSPHATE 4 MG/ML
INJECTION, SOLUTION INTRA-ARTICULAR; INTRALESIONAL; INTRAMUSCULAR; INTRAVENOUS; SOFT TISSUE AS NEEDED
Status: DISCONTINUED | OUTPATIENT
Start: 2021-07-01 | End: 2021-07-01 | Stop reason: SURG

## 2021-07-01 RX ORDER — 0.9 % SODIUM CHLORIDE 0.9 %
VIAL (ML) INJECTION AS NEEDED
Status: DISCONTINUED | OUTPATIENT
Start: 2021-07-01 | End: 2021-07-01 | Stop reason: HOSPADM

## 2021-07-01 RX ORDER — LABETALOL HYDROCHLORIDE 5 MG/ML
5 INJECTION, SOLUTION INTRAVENOUS
Status: DISCONTINUED | OUTPATIENT
Start: 2021-07-01 | End: 2021-07-01 | Stop reason: HOSPADM

## 2021-07-01 RX ORDER — SODIUM CHLORIDE 0.9 % (FLUSH) 0.9 %
1-10 SYRINGE (ML) INJECTION AS NEEDED
Status: DISCONTINUED | OUTPATIENT
Start: 2021-07-01 | End: 2021-07-01 | Stop reason: HOSPADM

## 2021-07-01 RX ORDER — SODIUM CHLORIDE 0.9 % (FLUSH) 0.9 %
10 SYRINGE (ML) INJECTION AS NEEDED
Status: DISCONTINUED | OUTPATIENT
Start: 2021-07-01 | End: 2021-07-01 | Stop reason: HOSPADM

## 2021-07-01 RX ORDER — SODIUM CHLORIDE, SODIUM LACTATE, POTASSIUM CHLORIDE, CALCIUM CHLORIDE 600; 310; 30; 20 MG/100ML; MG/100ML; MG/100ML; MG/100ML
9 INJECTION, SOLUTION INTRAVENOUS CONTINUOUS
Status: DISCONTINUED | OUTPATIENT
Start: 2021-07-01 | End: 2021-07-01 | Stop reason: HOSPADM

## 2021-07-01 RX ORDER — HYDROCODONE BITARTRATE AND ACETAMINOPHEN 5; 325 MG/1; MG/1
1 TABLET ORAL ONCE AS NEEDED
Status: DISCONTINUED | OUTPATIENT
Start: 2021-07-01 | End: 2021-07-01 | Stop reason: HOSPADM

## 2021-07-01 RX ORDER — SODIUM CHLORIDE 0.9 % (FLUSH) 0.9 %
10 SYRINGE (ML) INJECTION EVERY 12 HOURS SCHEDULED
Status: DISCONTINUED | OUTPATIENT
Start: 2021-07-01 | End: 2021-07-01 | Stop reason: HOSPADM

## 2021-07-01 RX ORDER — BUPIVACAINE HCL/0.9 % NACL/PF 0.1 %
2 PLASTIC BAG, INJECTION (ML) EPIDURAL ONCE
Status: COMPLETED | OUTPATIENT
Start: 2021-07-01 | End: 2021-07-01

## 2021-07-01 RX ORDER — PROPOFOL 10 MG/ML
VIAL (ML) INTRAVENOUS AS NEEDED
Status: DISCONTINUED | OUTPATIENT
Start: 2021-07-01 | End: 2021-07-01 | Stop reason: SURG

## 2021-07-01 RX ORDER — SODIUM CHLORIDE 0.9 % (FLUSH) 0.9 %
3 SYRINGE (ML) INJECTION EVERY 12 HOURS SCHEDULED
Status: DISCONTINUED | OUTPATIENT
Start: 2021-07-01 | End: 2021-07-01 | Stop reason: HOSPADM

## 2021-07-01 RX ORDER — SODIUM CHLORIDE, SODIUM LACTATE, POTASSIUM CHLORIDE, CALCIUM CHLORIDE 600; 310; 30; 20 MG/100ML; MG/100ML; MG/100ML; MG/100ML
1000 INJECTION, SOLUTION INTRAVENOUS CONTINUOUS
Status: DISCONTINUED | OUTPATIENT
Start: 2021-07-01 | End: 2021-07-01 | Stop reason: HOSPADM

## 2021-07-01 RX ORDER — DEXTROSE MONOHYDRATE 25 G/50ML
12.5 INJECTION, SOLUTION INTRAVENOUS AS NEEDED
Status: DISCONTINUED | OUTPATIENT
Start: 2021-07-01 | End: 2021-07-01 | Stop reason: HOSPADM

## 2021-07-01 RX ORDER — PHENYLEPHRINE HCL IN 0.9% NACL 0.8MG/10ML
SYRINGE (ML) INTRAVENOUS AS NEEDED
Status: DISCONTINUED | OUTPATIENT
Start: 2021-07-01 | End: 2021-07-01 | Stop reason: SURG

## 2021-07-01 RX ORDER — LIDOCAINE HYDROCHLORIDE 10 MG/ML
0.5 INJECTION, SOLUTION EPIDURAL; INFILTRATION; INTRACAUDAL; PERINEURAL ONCE AS NEEDED
Status: DISCONTINUED | OUTPATIENT
Start: 2021-07-01 | End: 2021-07-01 | Stop reason: HOSPADM

## 2021-07-01 RX ORDER — ONDANSETRON 4 MG/1
4 TABLET, FILM COATED ORAL ONCE AS NEEDED
Status: DISCONTINUED | OUTPATIENT
Start: 2021-07-01 | End: 2021-07-01 | Stop reason: HOSPADM

## 2021-07-01 RX ORDER — ONDANSETRON 2 MG/ML
INJECTION INTRAMUSCULAR; INTRAVENOUS AS NEEDED
Status: DISCONTINUED | OUTPATIENT
Start: 2021-07-01 | End: 2021-07-01 | Stop reason: SURG

## 2021-07-01 RX ORDER — OXYCODONE AND ACETAMINOPHEN 7.5; 325 MG/1; MG/1
2 TABLET ORAL EVERY 4 HOURS PRN
Status: DISCONTINUED | OUTPATIENT
Start: 2021-07-01 | End: 2021-07-01 | Stop reason: HOSPADM

## 2021-07-01 RX ORDER — SODIUM CHLORIDE 0.9 % (FLUSH) 0.9 %
3 SYRINGE (ML) INJECTION AS NEEDED
Status: DISCONTINUED | OUTPATIENT
Start: 2021-07-01 | End: 2021-07-01 | Stop reason: HOSPADM

## 2021-07-01 RX ORDER — FLUMAZENIL 0.1 MG/ML
0.2 INJECTION INTRAVENOUS AS NEEDED
Status: DISCONTINUED | OUTPATIENT
Start: 2021-07-01 | End: 2021-07-01 | Stop reason: HOSPADM

## 2021-07-01 RX ORDER — ONDANSETRON 2 MG/ML
4 INJECTION INTRAMUSCULAR; INTRAVENOUS ONCE AS NEEDED
Status: DISCONTINUED | OUTPATIENT
Start: 2021-07-01 | End: 2021-07-01 | Stop reason: HOSPADM

## 2021-07-01 RX ORDER — SODIUM CHLORIDE 9 MG/ML
100 INJECTION, SOLUTION INTRAVENOUS CONTINUOUS
Status: DISCONTINUED | OUTPATIENT
Start: 2021-07-01 | End: 2021-07-01 | Stop reason: HOSPADM

## 2021-07-01 RX ADMIN — PROPOFOL 120 MG: 10 INJECTION, EMULSION INTRAVENOUS at 07:57

## 2021-07-01 RX ADMIN — LIDOCAINE HYDROCHLORIDE 1 EACH: 40 SOLUTION TOPICAL at 07:57

## 2021-07-01 RX ADMIN — DEXAMETHASONE SODIUM PHOSPHATE 4 MG: 4 INJECTION, SOLUTION INTRA-ARTICULAR; INTRALESIONAL; INTRAMUSCULAR; INTRAVENOUS; SOFT TISSUE at 08:09

## 2021-07-01 RX ADMIN — Medication 80 MCG: at 08:00

## 2021-07-01 RX ADMIN — SODIUM CHLORIDE, POTASSIUM CHLORIDE, SODIUM LACTATE AND CALCIUM CHLORIDE 1000 ML: 600; 310; 30; 20 INJECTION, SOLUTION INTRAVENOUS at 06:25

## 2021-07-01 RX ADMIN — LIDOCAINE HYDROCHLORIDE 100 MG: 20 INJECTION, SOLUTION EPIDURAL; INFILTRATION; INTRACAUDAL; PERINEURAL at 07:57

## 2021-07-01 RX ADMIN — FENTANYL CITRATE 100 MCG: 50 INJECTION, SOLUTION INTRAMUSCULAR; INTRAVENOUS at 07:54

## 2021-07-01 RX ADMIN — ROCURONIUM BROMIDE 25 MG: 50 INJECTION INTRAVENOUS at 07:57

## 2021-07-01 RX ADMIN — Medication 2 G: at 08:00

## 2021-07-01 RX ADMIN — ONDANSETRON 4 MG: 2 INJECTION INTRAMUSCULAR; INTRAVENOUS at 08:09

## 2021-07-01 RX ADMIN — SUGAMMADEX 200 MG: 100 INJECTION, SOLUTION INTRAVENOUS at 08:15

## 2021-07-08 ENCOUNTER — HOSPITAL ENCOUNTER (OUTPATIENT)
Dept: RADIATION ONCOLOGY | Facility: HOSPITAL | Age: 71
Setting detail: RADIATION/ONCOLOGY SERIES
Discharge: HOME | End: 2021-07-08

## 2021-07-08 ENCOUNTER — HOSPITAL ENCOUNTER (OUTPATIENT)
Dept: RADIATION ONCOLOGY | Facility: HOSPITAL | Age: 71
Setting detail: RADIATION/ONCOLOGY SERIES
End: 2021-07-08

## 2021-07-08 PROCEDURE — 77334 RADIATION TREATMENT AID(S): CPT | Performed by: RADIOLOGY

## 2021-07-16 PROCEDURE — 77338 DESIGN MLC DEVICE FOR IMRT: CPT | Performed by: RADIOLOGY

## 2021-07-16 PROCEDURE — 77300 RADIATION THERAPY DOSE PLAN: CPT | Performed by: RADIOLOGY

## 2021-07-16 PROCEDURE — 77301 RADIOTHERAPY DOSE PLAN IMRT: CPT | Performed by: RADIOLOGY

## 2021-07-26 ENCOUNTER — HOSPITAL ENCOUNTER (OUTPATIENT)
Dept: RADIATION ONCOLOGY | Facility: HOSPITAL | Age: 71
Setting detail: RADIATION/ONCOLOGY SERIES
Discharge: HOME | End: 2021-07-26

## 2021-07-26 PROCEDURE — 77385: CPT | Performed by: RADIOLOGY

## 2021-07-27 ENCOUNTER — HOSPITAL ENCOUNTER (OUTPATIENT)
Dept: RADIATION ONCOLOGY | Facility: HOSPITAL | Age: 71
Setting detail: RADIATION/ONCOLOGY SERIES
Discharge: HOME | End: 2021-07-27

## 2021-07-27 PROCEDURE — 77385: CPT | Performed by: RADIOLOGY

## 2021-07-28 ENCOUNTER — HOSPITAL ENCOUNTER (OUTPATIENT)
Dept: RADIATION ONCOLOGY | Facility: HOSPITAL | Age: 71
Setting detail: RADIATION/ONCOLOGY SERIES
Discharge: HOME | End: 2021-07-28

## 2021-07-28 PROCEDURE — 77385: CPT | Performed by: RADIOLOGY

## 2021-07-29 ENCOUNTER — HOSPITAL ENCOUNTER (OUTPATIENT)
Dept: RADIATION ONCOLOGY | Facility: HOSPITAL | Age: 71
Setting detail: RADIATION/ONCOLOGY SERIES
Discharge: HOME | End: 2021-07-29

## 2021-07-29 PROCEDURE — 77336 RADIATION PHYSICS CONSULT: CPT | Performed by: RADIOLOGY

## 2021-07-29 PROCEDURE — 77385: CPT | Performed by: RADIOLOGY

## 2021-07-30 ENCOUNTER — HOSPITAL ENCOUNTER (OUTPATIENT)
Dept: RADIATION ONCOLOGY | Facility: HOSPITAL | Age: 71
Setting detail: RADIATION/ONCOLOGY SERIES
Discharge: HOME | End: 2021-07-30

## 2021-07-30 PROCEDURE — 77385: CPT | Performed by: RADIOLOGY

## 2021-08-02 ENCOUNTER — HOSPITAL ENCOUNTER (OUTPATIENT)
Dept: RADIATION ONCOLOGY | Facility: HOSPITAL | Age: 71
Setting detail: RADIATION/ONCOLOGY SERIES
Discharge: HOME | End: 2021-08-02

## 2021-08-02 ENCOUNTER — HOSPITAL ENCOUNTER (OUTPATIENT)
Dept: RADIATION ONCOLOGY | Facility: HOSPITAL | Age: 71
Setting detail: RADIATION/ONCOLOGY SERIES
End: 2021-08-02

## 2021-08-02 PROCEDURE — 77385: CPT | Performed by: RADIOLOGY

## 2021-08-03 ENCOUNTER — HOSPITAL ENCOUNTER (OUTPATIENT)
Dept: RADIATION ONCOLOGY | Facility: HOSPITAL | Age: 71
Setting detail: RADIATION/ONCOLOGY SERIES
Discharge: HOME | End: 2021-08-03

## 2021-08-03 PROCEDURE — 77300 RADIATION THERAPY DOSE PLAN: CPT | Performed by: RADIOLOGY

## 2021-08-03 PROCEDURE — 77385: CPT | Performed by: RADIOLOGY

## 2021-08-03 RX ORDER — TAMSULOSIN HYDROCHLORIDE 0.4 MG/1
1 CAPSULE ORAL NIGHTLY
Qty: 30 CAPSULE | Refills: 1 | Status: SHIPPED | OUTPATIENT
Start: 2021-08-03 | End: 2021-08-17

## 2021-08-04 ENCOUNTER — HOSPITAL ENCOUNTER (OUTPATIENT)
Dept: RADIATION ONCOLOGY | Facility: HOSPITAL | Age: 71
Setting detail: RADIATION/ONCOLOGY SERIES
Discharge: HOME | End: 2021-08-04

## 2021-08-04 PROCEDURE — 77385: CPT | Performed by: RADIOLOGY

## 2021-08-05 ENCOUNTER — HOSPITAL ENCOUNTER (OUTPATIENT)
Dept: RADIATION ONCOLOGY | Facility: HOSPITAL | Age: 71
Setting detail: RADIATION/ONCOLOGY SERIES
Discharge: HOME | End: 2021-08-05

## 2021-08-05 PROCEDURE — 77336 RADIATION PHYSICS CONSULT: CPT | Performed by: RADIOLOGY

## 2021-08-05 PROCEDURE — 77385: CPT | Performed by: RADIOLOGY

## 2021-08-06 ENCOUNTER — HOSPITAL ENCOUNTER (OUTPATIENT)
Dept: RADIATION ONCOLOGY | Facility: HOSPITAL | Age: 71
Setting detail: RADIATION/ONCOLOGY SERIES
Discharge: HOME | End: 2021-08-06

## 2021-08-06 PROCEDURE — 77385: CPT | Performed by: RADIOLOGY

## 2021-08-09 ENCOUNTER — HOSPITAL ENCOUNTER (OUTPATIENT)
Dept: RADIATION ONCOLOGY | Facility: HOSPITAL | Age: 71
Setting detail: RADIATION/ONCOLOGY SERIES
Discharge: HOME | End: 2021-08-09

## 2021-08-09 PROCEDURE — 77385: CPT | Performed by: RADIOLOGY

## 2021-08-10 ENCOUNTER — HOSPITAL ENCOUNTER (OUTPATIENT)
Dept: RADIATION ONCOLOGY | Facility: HOSPITAL | Age: 71
Setting detail: RADIATION/ONCOLOGY SERIES
Discharge: HOME | End: 2021-08-10

## 2021-08-10 PROCEDURE — 77385: CPT | Performed by: RADIOLOGY

## 2021-08-11 ENCOUNTER — HOSPITAL ENCOUNTER (OUTPATIENT)
Dept: RADIATION ONCOLOGY | Facility: HOSPITAL | Age: 71
Setting detail: RADIATION/ONCOLOGY SERIES
Discharge: HOME | End: 2021-08-11

## 2021-08-11 PROCEDURE — 77385: CPT | Performed by: RADIOLOGY

## 2021-08-12 ENCOUNTER — HOSPITAL ENCOUNTER (OUTPATIENT)
Dept: RADIATION ONCOLOGY | Facility: HOSPITAL | Age: 71
Setting detail: RADIATION/ONCOLOGY SERIES
Discharge: HOME | End: 2021-08-12

## 2021-08-12 PROCEDURE — 77385: CPT | Performed by: RADIOLOGY

## 2021-08-13 ENCOUNTER — HOSPITAL ENCOUNTER (OUTPATIENT)
Dept: RADIATION ONCOLOGY | Facility: HOSPITAL | Age: 71
Setting detail: RADIATION/ONCOLOGY SERIES
Discharge: HOME | End: 2021-08-13

## 2021-08-13 PROCEDURE — 77336 RADIATION PHYSICS CONSULT: CPT | Performed by: RADIOLOGY

## 2021-08-13 PROCEDURE — 77385: CPT | Performed by: RADIOLOGY

## 2021-08-16 ENCOUNTER — HOSPITAL ENCOUNTER (OUTPATIENT)
Dept: RADIATION ONCOLOGY | Facility: HOSPITAL | Age: 71
Setting detail: RADIATION/ONCOLOGY SERIES
Discharge: HOME | End: 2021-08-16

## 2021-08-16 PROCEDURE — 77385: CPT | Performed by: RADIOLOGY

## 2021-08-17 ENCOUNTER — HOSPITAL ENCOUNTER (OUTPATIENT)
Dept: RADIATION ONCOLOGY | Facility: HOSPITAL | Age: 71
Setting detail: RADIATION/ONCOLOGY SERIES
Discharge: HOME | End: 2021-08-17

## 2021-08-17 PROCEDURE — 77385: CPT | Performed by: RADIOLOGY

## 2021-08-17 RX ORDER — TAMSULOSIN HYDROCHLORIDE 0.4 MG/1
1 CAPSULE ORAL 2 TIMES DAILY
Qty: 60 CAPSULE | Refills: 1 | Status: SHIPPED | OUTPATIENT
Start: 2021-08-17 | End: 2021-08-23 | Stop reason: SDUPTHER

## 2021-08-18 ENCOUNTER — HOSPITAL ENCOUNTER (OUTPATIENT)
Dept: RADIATION ONCOLOGY | Facility: HOSPITAL | Age: 71
Setting detail: RADIATION/ONCOLOGY SERIES
Discharge: HOME | End: 2021-08-18

## 2021-08-18 PROCEDURE — 77385: CPT | Performed by: RADIOLOGY

## 2021-08-19 ENCOUNTER — HOSPITAL ENCOUNTER (OUTPATIENT)
Dept: RADIATION ONCOLOGY | Facility: HOSPITAL | Age: 71
Setting detail: RADIATION/ONCOLOGY SERIES
Discharge: HOME | End: 2021-08-19

## 2021-08-19 PROCEDURE — 77336 RADIATION PHYSICS CONSULT: CPT | Performed by: RADIOLOGY

## 2021-08-19 PROCEDURE — 77385: CPT | Performed by: RADIOLOGY

## 2021-08-20 ENCOUNTER — HOSPITAL ENCOUNTER (OUTPATIENT)
Dept: RADIATION ONCOLOGY | Facility: HOSPITAL | Age: 71
Setting detail: RADIATION/ONCOLOGY SERIES
Discharge: HOME | End: 2021-08-20

## 2021-08-20 PROCEDURE — 77385: CPT | Performed by: RADIOLOGY

## 2021-08-23 ENCOUNTER — HOSPITAL ENCOUNTER (OUTPATIENT)
Dept: RADIATION ONCOLOGY | Facility: HOSPITAL | Age: 71
Setting detail: RADIATION/ONCOLOGY SERIES
Discharge: HOME | End: 2021-08-23

## 2021-08-23 PROCEDURE — 77385: CPT | Performed by: RADIOLOGY

## 2021-08-23 RX ORDER — TAMSULOSIN HYDROCHLORIDE 0.4 MG/1
1 CAPSULE ORAL 2 TIMES DAILY
Qty: 60 CAPSULE | Refills: 0 | Status: SHIPPED | OUTPATIENT
Start: 2021-08-23 | End: 2022-04-06

## 2021-08-24 ENCOUNTER — HOSPITAL ENCOUNTER (OUTPATIENT)
Dept: RADIATION ONCOLOGY | Facility: HOSPITAL | Age: 71
Setting detail: RADIATION/ONCOLOGY SERIES
Discharge: HOME | End: 2021-08-24

## 2021-08-24 PROCEDURE — 77385: CPT | Performed by: RADIOLOGY

## 2021-08-25 ENCOUNTER — HOSPITAL ENCOUNTER (OUTPATIENT)
Dept: RADIATION ONCOLOGY | Facility: HOSPITAL | Age: 71
Setting detail: RADIATION/ONCOLOGY SERIES
Discharge: HOME | End: 2021-08-25

## 2021-08-25 PROCEDURE — 77385: CPT | Performed by: RADIOLOGY

## 2021-08-26 ENCOUNTER — HOSPITAL ENCOUNTER (OUTPATIENT)
Dept: RADIATION ONCOLOGY | Facility: HOSPITAL | Age: 71
Setting detail: RADIATION/ONCOLOGY SERIES
Discharge: HOME | End: 2021-08-26

## 2021-08-26 PROCEDURE — 77336 RADIATION PHYSICS CONSULT: CPT | Performed by: RADIOLOGY

## 2021-08-26 PROCEDURE — 77385: CPT | Performed by: RADIOLOGY

## 2021-08-27 ENCOUNTER — HOSPITAL ENCOUNTER (OUTPATIENT)
Dept: RADIATION ONCOLOGY | Facility: HOSPITAL | Age: 71
Setting detail: RADIATION/ONCOLOGY SERIES
Discharge: HOME | End: 2021-08-27

## 2021-08-27 PROCEDURE — 77385: CPT | Performed by: RADIOLOGY

## 2021-08-30 ENCOUNTER — HOSPITAL ENCOUNTER (OUTPATIENT)
Dept: RADIATION ONCOLOGY | Facility: HOSPITAL | Age: 71
Setting detail: RADIATION/ONCOLOGY SERIES
Discharge: HOME | End: 2021-08-30

## 2021-08-30 PROCEDURE — 77385: CPT | Performed by: RADIOLOGY

## 2021-08-31 ENCOUNTER — HOSPITAL ENCOUNTER (OUTPATIENT)
Dept: RADIATION ONCOLOGY | Facility: HOSPITAL | Age: 71
Setting detail: RADIATION/ONCOLOGY SERIES
Discharge: HOME | End: 2021-08-31

## 2021-08-31 PROCEDURE — 77385: CPT | Performed by: RADIOLOGY

## 2021-09-01 ENCOUNTER — HOSPITAL ENCOUNTER (OUTPATIENT)
Dept: RADIATION ONCOLOGY | Facility: HOSPITAL | Age: 71
Setting detail: RADIATION/ONCOLOGY SERIES
End: 2021-09-01

## 2021-09-01 ENCOUNTER — HOSPITAL ENCOUNTER (OUTPATIENT)
Dept: RADIATION ONCOLOGY | Facility: HOSPITAL | Age: 71
Setting detail: RADIATION/ONCOLOGY SERIES
Discharge: HOME | End: 2021-09-01

## 2021-09-01 PROCEDURE — 77385: CPT | Performed by: RADIOLOGY

## 2021-09-01 PROCEDURE — 77336 RADIATION PHYSICS CONSULT: CPT | Performed by: RADIOLOGY

## 2021-10-11 PROBLEM — Z92.3 HISTORY OF RADIATION THERAPY: Status: ACTIVE | Noted: 2021-10-11

## 2021-10-13 ENCOUNTER — HOSPITAL ENCOUNTER (OUTPATIENT)
Dept: RADIATION ONCOLOGY | Facility: HOSPITAL | Age: 71
Setting detail: RADIATION/ONCOLOGY SERIES
End: 2021-10-13

## 2021-10-13 ENCOUNTER — OFFICE VISIT (OUTPATIENT)
Dept: RADIATION ONCOLOGY | Facility: HOSPITAL | Age: 71
End: 2021-10-13

## 2021-10-13 VITALS
SYSTOLIC BLOOD PRESSURE: 146 MMHG | HEART RATE: 90 BPM | WEIGHT: 222 LBS | DIASTOLIC BLOOD PRESSURE: 70 MMHG | RESPIRATION RATE: 18 BRPM | HEIGHT: 70 IN | BODY MASS INDEX: 31.78 KG/M2 | OXYGEN SATURATION: 98 %

## 2021-10-13 DIAGNOSIS — Z78.9 NON-SMOKER: ICD-10-CM

## 2021-10-13 DIAGNOSIS — C61 CANCER OF PROSTATE W/LOW RECURRENCE RISK (T1-2A, GLEASON<7 & PSA<10) (HCC): Primary | ICD-10-CM

## 2021-10-13 DIAGNOSIS — Z92.3 HISTORY OF RADIATION THERAPY: ICD-10-CM

## 2021-10-13 RX ORDER — TERBINAFINE HYDROCHLORIDE 250 MG/1
1 TABLET ORAL DAILY
COMMUNITY
Start: 2021-09-28

## 2021-10-13 RX ORDER — FLASH GLUCOSE SENSOR
KIT MISCELLANEOUS
COMMUNITY
Start: 2021-07-22

## 2021-10-13 RX ORDER — PEN NEEDLE, DIABETIC 32GX 5/32"
NEEDLE, DISPOSABLE MISCELLANEOUS
COMMUNITY
Start: 2021-09-05

## 2021-10-13 RX ORDER — FUROSEMIDE 40 MG/1
1 TABLET ORAL DAILY PRN
COMMUNITY
Start: 2021-09-28

## 2022-04-04 DIAGNOSIS — C61 PROSTATE CANCER: Primary | ICD-10-CM

## 2022-04-05 DIAGNOSIS — C61 PROSTATE CANCER: Primary | ICD-10-CM

## 2022-04-06 ENCOUNTER — OFFICE VISIT (OUTPATIENT)
Dept: UROLOGY | Facility: CLINIC | Age: 72
End: 2022-04-06

## 2022-04-06 VITALS — TEMPERATURE: 97.8 F | WEIGHT: 225 LBS | BODY MASS INDEX: 32.21 KG/M2 | HEIGHT: 70 IN

## 2022-04-06 DIAGNOSIS — N40.1 BPH WITH URINARY OBSTRUCTION: ICD-10-CM

## 2022-04-06 DIAGNOSIS — N13.8 BPH WITH URINARY OBSTRUCTION: ICD-10-CM

## 2022-04-06 DIAGNOSIS — C61 PROSTATE CANCER: Primary | ICD-10-CM

## 2022-04-06 DIAGNOSIS — N52.9 IMPOTENCE OF ORGANIC ORIGIN: ICD-10-CM

## 2022-04-06 LAB
BILIRUB BLD-MCNC: NEGATIVE MG/DL
CLARITY, POC: CLEAR
COLOR UR: YELLOW
GLUCOSE UR STRIP-MCNC: NEGATIVE MG/DL
KETONES UR QL: NEGATIVE
LEUKOCYTE EST, POC: NEGATIVE
NITRITE UR-MCNC: NEGATIVE MG/ML
PH UR: 5.5 [PH] (ref 5–8)
PROT UR STRIP-MCNC: NEGATIVE MG/DL
PSA SERPL-MCNC: 0.33 NG/ML (ref 0–4)
RBC # UR STRIP: NEGATIVE /UL
SP GR UR: 1.02 (ref 1–1.03)
UROBILINOGEN UR QL: NORMAL

## 2022-04-06 PROCEDURE — 99213 OFFICE O/P EST LOW 20 MIN: CPT | Performed by: UROLOGY

## 2022-04-06 PROCEDURE — 81003 URINALYSIS AUTO W/O SCOPE: CPT | Performed by: UROLOGY

## 2022-08-16 ENCOUNTER — OFFICE VISIT (OUTPATIENT)
Dept: GASTROENTEROLOGY | Facility: CLINIC | Age: 72
End: 2022-08-16

## 2022-08-16 ENCOUNTER — TELEPHONE (OUTPATIENT)
Dept: GASTROENTEROLOGY | Facility: CLINIC | Age: 72
End: 2022-08-16

## 2022-08-16 VITALS
OXYGEN SATURATION: 97 % | TEMPERATURE: 96.2 F | WEIGHT: 214 LBS | SYSTOLIC BLOOD PRESSURE: 144 MMHG | HEART RATE: 83 BPM | BODY MASS INDEX: 30.64 KG/M2 | HEIGHT: 70 IN | DIASTOLIC BLOOD PRESSURE: 80 MMHG

## 2022-08-16 DIAGNOSIS — Z01.818 PREOPERATIVE TESTING: Primary | ICD-10-CM

## 2022-08-16 DIAGNOSIS — R12 HEARTBURN: ICD-10-CM

## 2022-08-16 DIAGNOSIS — Z86.010 HX OF COLONIC POLYP: ICD-10-CM

## 2022-08-16 DIAGNOSIS — R13.14 PHARYNGOESOPHAGEAL DYSPHAGIA: Primary | ICD-10-CM

## 2022-08-16 DIAGNOSIS — Z80.0 FAMILY HX OF COLON CANCER: ICD-10-CM

## 2022-08-16 PROBLEM — Z86.0100 HX OF COLONIC POLYP: Status: ACTIVE | Noted: 2022-08-16

## 2022-08-16 PROCEDURE — 99204 OFFICE O/P NEW MOD 45 MIN: CPT | Performed by: NURSE PRACTITIONER

## 2022-08-16 RX ORDER — POLYETHYLENE GLYCOL 3350, SODIUM SULFATE ANHYDROUS, SODIUM BICARBONATE, SODIUM CHLORIDE, POTASSIUM CHLORIDE 236; 22.74; 6.74; 5.86; 2.97 G/4L; G/4L; G/4L; G/4L; G/4L
4 POWDER, FOR SOLUTION ORAL ONCE
Qty: 4000 ML | Refills: 0 | Status: SHIPPED | OUTPATIENT
Start: 2022-08-16 | End: 2022-08-16

## 2022-09-19 ENCOUNTER — ANESTHESIA (OUTPATIENT)
Dept: GASTROENTEROLOGY | Facility: HOSPITAL | Age: 72
End: 2022-09-19

## 2022-09-19 ENCOUNTER — ANESTHESIA EVENT (OUTPATIENT)
Dept: GASTROENTEROLOGY | Facility: HOSPITAL | Age: 72
End: 2022-09-19

## 2022-09-19 ENCOUNTER — HOSPITAL ENCOUNTER (OUTPATIENT)
Facility: HOSPITAL | Age: 72
Setting detail: HOSPITAL OUTPATIENT SURGERY
Discharge: HOME | End: 2022-09-19
Attending: INTERNAL MEDICINE | Admitting: INTERNAL MEDICINE

## 2022-09-19 VITALS
WEIGHT: 215 LBS | BODY MASS INDEX: 30.78 KG/M2 | TEMPERATURE: 96.7 F | HEART RATE: 82 BPM | OXYGEN SATURATION: 97 % | RESPIRATION RATE: 18 BRPM | HEIGHT: 70 IN | DIASTOLIC BLOOD PRESSURE: 83 MMHG | SYSTOLIC BLOOD PRESSURE: 142 MMHG

## 2022-09-19 DIAGNOSIS — R13.14 PHARYNGOESOPHAGEAL DYSPHAGIA: ICD-10-CM

## 2022-09-19 DIAGNOSIS — Z80.0 FAMILY HX OF COLON CANCER: ICD-10-CM

## 2022-09-19 DIAGNOSIS — Z86.010 HX OF COLONIC POLYP: ICD-10-CM

## 2022-09-19 LAB — GLUCOSE BLDC GLUCOMTR-MCNC: 111 MG/DL (ref 70–130)

## 2022-09-19 PROCEDURE — 82962 GLUCOSE BLOOD TEST: CPT

## 2022-09-19 PROCEDURE — 25010000002 PROPOFOL 10 MG/ML EMULSION: Performed by: NURSE ANESTHETIST, CERTIFIED REGISTERED

## 2022-09-19 PROCEDURE — 45385 COLONOSCOPY W/LESION REMOVAL: CPT | Performed by: INTERNAL MEDICINE

## 2022-09-19 PROCEDURE — 88305 TISSUE EXAM BY PATHOLOGIST: CPT | Performed by: INTERNAL MEDICINE

## 2022-09-19 RX ORDER — ONDANSETRON 2 MG/ML
4 INJECTION INTRAMUSCULAR; INTRAVENOUS ONCE AS NEEDED
Status: DISCONTINUED | OUTPATIENT
Start: 2022-09-19 | End: 2022-09-19 | Stop reason: HOSPADM

## 2022-09-19 RX ORDER — PROPOFOL 10 MG/ML
VIAL (ML) INTRAVENOUS AS NEEDED
Status: DISCONTINUED | OUTPATIENT
Start: 2022-09-19 | End: 2022-09-19 | Stop reason: SURG

## 2022-09-19 RX ORDER — LIDOCAINE HYDROCHLORIDE 10 MG/ML
0.5 INJECTION, SOLUTION EPIDURAL; INFILTRATION; INTRACAUDAL; PERINEURAL ONCE AS NEEDED
Status: DISCONTINUED | OUTPATIENT
Start: 2022-09-19 | End: 2022-09-19 | Stop reason: HOSPADM

## 2022-09-19 RX ORDER — SODIUM CHLORIDE 0.9 % (FLUSH) 0.9 %
10 SYRINGE (ML) INJECTION AS NEEDED
Status: DISCONTINUED | OUTPATIENT
Start: 2022-09-19 | End: 2022-09-19 | Stop reason: HOSPADM

## 2022-09-19 RX ORDER — SODIUM CHLORIDE 0.9 % (FLUSH) 0.9 %
10 SYRINGE (ML) INJECTION EVERY 12 HOURS SCHEDULED
Status: CANCELLED | OUTPATIENT
Start: 2022-09-19

## 2022-09-19 RX ORDER — SODIUM CHLORIDE 0.9 % (FLUSH) 0.9 %
10 SYRINGE (ML) INJECTION AS NEEDED
Status: CANCELLED | OUTPATIENT
Start: 2022-09-19

## 2022-09-19 RX ORDER — SODIUM CHLORIDE 9 MG/ML
100 INJECTION, SOLUTION INTRAVENOUS CONTINUOUS
Status: CANCELLED | OUTPATIENT
Start: 2022-09-19

## 2022-09-19 RX ORDER — LIDOCAINE HYDROCHLORIDE 20 MG/ML
INJECTION, SOLUTION EPIDURAL; INFILTRATION; INTRACAUDAL; PERINEURAL AS NEEDED
Status: DISCONTINUED | OUTPATIENT
Start: 2022-09-19 | End: 2022-09-19 | Stop reason: SURG

## 2022-09-19 RX ORDER — SODIUM CHLORIDE 9 MG/ML
500 INJECTION, SOLUTION INTRAVENOUS CONTINUOUS PRN
Status: DISCONTINUED | OUTPATIENT
Start: 2022-09-19 | End: 2022-09-19 | Stop reason: HOSPADM

## 2022-09-19 RX ADMIN — PROPOFOL 500 MG: 10 INJECTION, EMULSION INTRAVENOUS at 07:57

## 2022-09-19 RX ADMIN — SODIUM CHLORIDE 500 ML: 9 INJECTION, SOLUTION INTRAVENOUS at 07:28

## 2022-09-19 RX ADMIN — LIDOCAINE HYDROCHLORIDE 50 MG: 20 INJECTION, SOLUTION EPIDURAL; INFILTRATION; INTRACAUDAL; PERINEURAL at 07:57

## 2022-09-23 LAB
CYTO UR: NORMAL
LAB AP CASE REPORT: NORMAL
Lab: NORMAL
PATH REPORT.FINAL DX SPEC: NORMAL
PATH REPORT.GROSS SPEC: NORMAL

## 2022-10-13 ENCOUNTER — TELEPHONE (OUTPATIENT)
Dept: UROLOGY | Facility: CLINIC | Age: 72
End: 2022-10-13

## 2022-10-19 ENCOUNTER — OFFICE VISIT (OUTPATIENT)
Dept: UROLOGY | Facility: CLINIC | Age: 72
End: 2022-10-19

## 2022-10-19 VITALS — HEIGHT: 70 IN | BODY MASS INDEX: 31.55 KG/M2 | WEIGHT: 220.4 LBS | TEMPERATURE: 96.4 F

## 2022-10-19 DIAGNOSIS — N52.9 IMPOTENCE OF ORGANIC ORIGIN: ICD-10-CM

## 2022-10-19 DIAGNOSIS — N40.1 BPH WITH URINARY OBSTRUCTION: ICD-10-CM

## 2022-10-19 DIAGNOSIS — N13.8 BPH WITH URINARY OBSTRUCTION: ICD-10-CM

## 2022-10-19 DIAGNOSIS — C61 PROSTATE CANCER: Primary | ICD-10-CM

## 2022-10-19 LAB
BILIRUB BLD-MCNC: NEGATIVE MG/DL
CLARITY, POC: CLEAR
COLOR UR: YELLOW
GLUCOSE UR STRIP-MCNC: ABNORMAL MG/DL
KETONES UR QL: NEGATIVE
LEUKOCYTE EST, POC: NEGATIVE
NITRITE UR-MCNC: NEGATIVE MG/ML
PH UR: 5 [PH] (ref 5–8)
PROT UR STRIP-MCNC: NEGATIVE MG/DL
RBC # UR STRIP: NEGATIVE /UL
SP GR UR: 1.01 (ref 1–1.03)
UROBILINOGEN UR QL: ABNORMAL

## 2022-10-19 PROCEDURE — 99213 OFFICE O/P EST LOW 20 MIN: CPT | Performed by: UROLOGY

## 2022-10-19 PROCEDURE — 81001 URINALYSIS AUTO W/SCOPE: CPT | Performed by: UROLOGY

## 2022-10-19 RX ORDER — LISINOPRIL 10 MG/1
TABLET ORAL
COMMUNITY
Start: 2022-09-14 | End: 2022-10-19 | Stop reason: SDUPTHER

## 2022-10-19 RX ORDER — OMEPRAZOLE 40 MG/1
CAPSULE, DELAYED RELEASE ORAL
COMMUNITY
Start: 2022-10-04 | End: 2022-10-19 | Stop reason: SDUPTHER

## 2022-11-03 ENCOUNTER — HOSPITAL ENCOUNTER (OUTPATIENT)
Dept: RADIATION ONCOLOGY | Facility: HOSPITAL | Age: 72
Setting detail: RADIATION/ONCOLOGY SERIES
End: 2022-11-03

## 2022-11-03 ENCOUNTER — OFFICE VISIT (OUTPATIENT)
Dept: RADIATION ONCOLOGY | Facility: HOSPITAL | Age: 72
End: 2022-11-03

## 2022-11-03 VITALS
RESPIRATION RATE: 18 BRPM | SYSTOLIC BLOOD PRESSURE: 153 MMHG | DIASTOLIC BLOOD PRESSURE: 70 MMHG | HEART RATE: 90 BPM | BODY MASS INDEX: 30.49 KG/M2 | WEIGHT: 213 LBS | OXYGEN SATURATION: 96 % | HEIGHT: 70 IN

## 2022-11-03 DIAGNOSIS — Z92.3 HISTORY OF RADIATION THERAPY: ICD-10-CM

## 2022-11-03 DIAGNOSIS — Z78.9 NON-SMOKER: ICD-10-CM

## 2022-11-03 DIAGNOSIS — C61 CANCER OF PROSTATE W/LOW RECURRENCE RISK (T1-2A, GLEASON<7 & PSA<10): Primary | ICD-10-CM

## 2022-11-03 PROCEDURE — G0463 HOSPITAL OUTPT CLINIC VISIT: HCPCS | Performed by: RADIOLOGY

## 2022-11-03 RX ORDER — TAMSULOSIN HYDROCHLORIDE 0.4 MG/1
1 CAPSULE ORAL DAILY
Qty: 30 CAPSULE | Refills: 2 | Status: SHIPPED | OUTPATIENT
Start: 2022-11-03

## 2022-11-08 LAB — PSA SERPL-MCNC: 0.2 NG/ML (ref 0–4)

## 2023-04-12 ENCOUNTER — LAB (OUTPATIENT)
Dept: UROLOGY | Facility: CLINIC | Age: 73
End: 2023-04-12
Payer: MEDICARE

## 2023-04-12 DIAGNOSIS — C61 PROSTATE CANCER: ICD-10-CM

## 2023-04-12 LAB — PSA SERPL-MCNC: 0.17 NG/ML (ref 0–4)

## 2023-04-19 ENCOUNTER — OFFICE VISIT (OUTPATIENT)
Dept: UROLOGY | Facility: CLINIC | Age: 73
End: 2023-04-19
Payer: MEDICARE

## 2023-04-19 VITALS — HEIGHT: 70 IN | TEMPERATURE: 97.2 F | WEIGHT: 223 LBS | BODY MASS INDEX: 31.92 KG/M2

## 2023-04-19 DIAGNOSIS — C61 PROSTATE CANCER: Primary | ICD-10-CM

## 2023-04-19 LAB
BILIRUB BLD-MCNC: NEGATIVE MG/DL
CLARITY, POC: CLEAR
COLOR UR: YELLOW
GLUCOSE UR STRIP-MCNC: ABNORMAL MG/DL
KETONES UR QL: NEGATIVE
LEUKOCYTE EST, POC: NEGATIVE
NITRITE UR-MCNC: NEGATIVE MG/ML
PH UR: 5 [PH] (ref 5–8)
PROT UR STRIP-MCNC: NEGATIVE MG/DL
RBC # UR STRIP: NEGATIVE /UL
SP GR UR: 1.02 (ref 1–1.03)
UROBILINOGEN UR QL: ABNORMAL

## 2023-04-19 PROCEDURE — 99213 OFFICE O/P EST LOW 20 MIN: CPT | Performed by: UROLOGY

## 2023-04-19 PROCEDURE — 81003 URINALYSIS AUTO W/O SCOPE: CPT | Performed by: UROLOGY

## 2023-04-19 PROCEDURE — 1160F RVW MEDS BY RX/DR IN RCRD: CPT | Performed by: UROLOGY

## 2023-04-19 PROCEDURE — 1159F MED LIST DOCD IN RCRD: CPT | Performed by: UROLOGY

## 2023-04-19 RX ORDER — LEVOTHYROXINE SODIUM 0.03 MG/1
1 TABLET ORAL DAILY
COMMUNITY
Start: 2023-02-09

## 2023-04-19 RX ORDER — FERROUS SULFATE 325(65) MG
1 TABLET ORAL DAILY
COMMUNITY
Start: 2023-02-09

## 2023-10-06 ENCOUNTER — LAB (OUTPATIENT)
Dept: UROLOGY | Facility: CLINIC | Age: 73
End: 2023-10-06
Payer: MEDICARE

## 2023-10-06 DIAGNOSIS — C61 PROSTATE CANCER: ICD-10-CM

## 2023-10-07 LAB — PSA SERPL-MCNC: 0.11 NG/ML (ref 0–4)

## 2023-10-23 ENCOUNTER — OFFICE VISIT (OUTPATIENT)
Dept: UROLOGY | Facility: CLINIC | Age: 73
End: 2023-10-23
Payer: MEDICARE

## 2023-10-23 ENCOUNTER — TRANSCRIBE ORDERS (OUTPATIENT)
Dept: PHYSICAL THERAPY | Facility: CLINIC | Age: 73
End: 2023-10-23
Payer: MEDICARE

## 2023-10-23 VITALS — BODY MASS INDEX: 32.13 KG/M2 | WEIGHT: 224.4 LBS | TEMPERATURE: 97 F | HEIGHT: 70 IN

## 2023-10-23 DIAGNOSIS — R13.14 DYSPHAGIA, PHARYNGOESOPHAGEAL PHASE: Primary | ICD-10-CM

## 2023-10-23 DIAGNOSIS — N39.41 URGE INCONTINENCE OF URINE: ICD-10-CM

## 2023-10-23 DIAGNOSIS — C61 PROSTATE CANCER: Primary | ICD-10-CM

## 2023-10-23 DIAGNOSIS — N52.9 IMPOTENCE OF ORGANIC ORIGIN: ICD-10-CM

## 2023-10-23 DIAGNOSIS — N40.1 BPH WITH URINARY OBSTRUCTION: ICD-10-CM

## 2023-10-23 DIAGNOSIS — N13.8 BPH WITH URINARY OBSTRUCTION: ICD-10-CM

## 2023-10-23 LAB
BILIRUB BLD-MCNC: NEGATIVE MG/DL
CLARITY, POC: CLEAR
COLOR UR: YELLOW
GLUCOSE UR STRIP-MCNC: ABNORMAL MG/DL
KETONES UR QL: NEGATIVE
LEUKOCYTE EST, POC: NEGATIVE
NITRITE UR-MCNC: NEGATIVE MG/ML
PH UR: 5.5 [PH] (ref 5–8)
PROT UR STRIP-MCNC: NEGATIVE MG/DL
RBC # UR STRIP: NEGATIVE /UL
SP GR UR: 1.02 (ref 1–1.03)
UROBILINOGEN UR QL: ABNORMAL

## 2023-10-23 PROCEDURE — 1159F MED LIST DOCD IN RCRD: CPT | Performed by: UROLOGY

## 2023-10-23 PROCEDURE — 99213 OFFICE O/P EST LOW 20 MIN: CPT | Performed by: UROLOGY

## 2023-10-23 PROCEDURE — 1160F RVW MEDS BY RX/DR IN RCRD: CPT | Performed by: UROLOGY

## 2023-10-23 PROCEDURE — 81001 URINALYSIS AUTO W/SCOPE: CPT | Performed by: UROLOGY

## 2023-10-23 RX ORDER — FAMOTIDINE 20 MG/1
TABLET, FILM COATED ORAL
COMMUNITY

## 2023-10-23 RX ORDER — LISINOPRIL 10 MG/1
1 TABLET ORAL DAILY
COMMUNITY

## 2023-10-23 RX ORDER — OMEPRAZOLE 40 MG/1
1 CAPSULE, DELAYED RELEASE ORAL DAILY
COMMUNITY

## 2023-10-23 RX ORDER — SITAGLIPTIN AND METFORMIN HYDROCHLORIDE 500; 50 MG/1; MG/1
1 TABLET, FILM COATED ORAL 2 TIMES DAILY
COMMUNITY

## 2023-10-27 ENCOUNTER — OFFICE VISIT (OUTPATIENT)
Dept: PHYSICAL THERAPY | Facility: CLINIC | Age: 73
End: 2023-10-27
Payer: MEDICARE

## 2023-10-27 DIAGNOSIS — R13.14 PHARYNGOESOPHAGEAL DYSPHAGIA: Primary | ICD-10-CM

## 2023-11-01 ENCOUNTER — HOSPITAL ENCOUNTER (OUTPATIENT)
Dept: RADIATION ONCOLOGY | Facility: HOSPITAL | Age: 73
Setting detail: RADIATION/ONCOLOGY SERIES
End: 2023-11-01
Payer: MEDICARE

## 2023-11-02 ENCOUNTER — OFFICE VISIT (OUTPATIENT)
Dept: RADIATION ONCOLOGY | Facility: HOSPITAL | Age: 73
End: 2023-11-02
Payer: MEDICARE

## 2023-11-02 VITALS
HEIGHT: 70 IN | HEART RATE: 90 BPM | OXYGEN SATURATION: 97 % | DIASTOLIC BLOOD PRESSURE: 69 MMHG | BODY MASS INDEX: 32.78 KG/M2 | SYSTOLIC BLOOD PRESSURE: 141 MMHG | WEIGHT: 229 LBS

## 2023-11-02 DIAGNOSIS — Z92.3 HISTORY OF RADIATION THERAPY: ICD-10-CM

## 2023-11-02 DIAGNOSIS — C61 CANCER OF PROSTATE W/LOW RECURRENCE RISK (T1-2A, GLEASON<7 & PSA<10): Primary | ICD-10-CM

## 2023-11-02 DIAGNOSIS — Z78.9 NON-SMOKER: ICD-10-CM

## 2023-11-02 PROCEDURE — G0463 HOSPITAL OUTPT CLINIC VISIT: HCPCS | Performed by: RADIOLOGY

## 2023-11-10 ENCOUNTER — TRANSCRIBE ORDERS (OUTPATIENT)
Dept: ADMINISTRATIVE | Facility: HOSPITAL | Age: 73
End: 2023-11-10
Payer: MEDICARE

## 2023-11-10 DIAGNOSIS — R13.14 DYSPHAGIA, PHARYNGOESOPHAGEAL PHASE: Primary | ICD-10-CM

## 2023-11-15 ENCOUNTER — TRANSCRIBE ORDERS (OUTPATIENT)
Dept: ADMINISTRATIVE | Facility: HOSPITAL | Age: 73
End: 2023-11-15
Payer: MEDICARE

## 2023-11-15 DIAGNOSIS — R13.14 DYSPHAGIA, PHARYNGOESOPHAGEAL PHASE: Primary | ICD-10-CM

## 2023-11-28 ENCOUNTER — HOSPITAL ENCOUNTER (OUTPATIENT)
Dept: GENERAL RADIOLOGY | Facility: HOSPITAL | Age: 73
Discharge: HOME | End: 2023-11-28
Admitting: FAMILY MEDICINE
Payer: MEDICARE

## 2023-11-28 DIAGNOSIS — R13.14 DYSPHAGIA, PHARYNGOESOPHAGEAL PHASE: ICD-10-CM

## 2023-11-28 PROCEDURE — A9270 NON-COVERED ITEM OR SERVICE: HCPCS | Performed by: FAMILY MEDICINE

## 2023-11-28 PROCEDURE — 74230 X-RAY XM SWLNG FUNCJ C+: CPT

## 2023-11-28 PROCEDURE — 92611 MOTION FLUOROSCOPY/SWALLOW: CPT

## 2023-11-28 PROCEDURE — 63710000001 BARIUM SULFATE 40 % PASTE: Performed by: FAMILY MEDICINE

## 2023-11-28 PROCEDURE — 63710000001 BARIUM SULFATE 40 % SUSPENSION: Performed by: FAMILY MEDICINE

## 2023-11-28 PROCEDURE — 63710000001 BARIUM SULFATE 40 % RECONSTITUTED SUSPENSION: Performed by: FAMILY MEDICINE

## 2023-11-28 RX ADMIN — BARIUM SULFATE 55 ML: 0.81 POWDER, FOR SUSPENSION ORAL at 10:16

## 2023-11-28 RX ADMIN — BARIUM SULFATE 20 ML: 400 PASTE ORAL at 10:15

## 2023-11-28 RX ADMIN — BARIUM SULFATE 250 ML: 400 SUSPENSION ORAL at 10:16

## 2023-11-30 ENCOUNTER — HOSPITAL ENCOUNTER (OUTPATIENT)
Dept: GENERAL RADIOLOGY | Facility: HOSPITAL | Age: 73
Discharge: HOME | End: 2023-11-30
Admitting: FAMILY MEDICINE
Payer: MEDICARE

## 2023-11-30 DIAGNOSIS — R13.14 DYSPHAGIA, PHARYNGOESOPHAGEAL PHASE: ICD-10-CM

## 2023-11-30 PROCEDURE — 63710000001 BARIUM SULFATE 700 MG TABLET: Performed by: RADIOLOGY

## 2023-11-30 PROCEDURE — 63710000001 BARIUM SULFATE 96 % RECONSTITUTED SUSPENSION: Performed by: RADIOLOGY

## 2023-11-30 PROCEDURE — A9270 NON-COVERED ITEM OR SERVICE: HCPCS | Performed by: RADIOLOGY

## 2023-11-30 PROCEDURE — 63710000001 BARIUM SULFATE 98 % RECONSTITUTED SUSPENSION: Performed by: RADIOLOGY

## 2023-11-30 PROCEDURE — 74220 X-RAY XM ESOPHAGUS 1CNTRST: CPT

## 2023-11-30 RX ADMIN — BARIUM SULFATE 120 ML: 980 POWDER, FOR SUSPENSION ORAL at 10:05

## 2023-11-30 RX ADMIN — BARIUM SULFATE 240 ML: 960 POWDER, FOR SUSPENSION ORAL at 10:05

## 2023-11-30 RX ADMIN — BARIUM SULFATE 700 MG: 700 TABLET ORAL at 10:05

## 2024-01-08 ENCOUNTER — TRANSCRIBE ORDERS (OUTPATIENT)
Dept: PHYSICAL THERAPY | Facility: CLINIC | Age: 74
End: 2024-01-08
Payer: MEDICARE

## 2024-01-08 DIAGNOSIS — R13.14 DYSPHAGIA, PHARYNGOESOPHAGEAL PHASE: Primary | ICD-10-CM

## 2024-01-12 ENCOUNTER — TREATMENT (OUTPATIENT)
Dept: PHYSICAL THERAPY | Facility: CLINIC | Age: 74
End: 2024-01-12
Payer: MEDICARE

## 2024-01-12 DIAGNOSIS — R13.12 OROPHARYNGEAL DYSPHAGIA: Primary | ICD-10-CM

## 2024-01-16 ENCOUNTER — TREATMENT (OUTPATIENT)
Dept: PHYSICAL THERAPY | Facility: CLINIC | Age: 74
End: 2024-01-16
Payer: MEDICARE

## 2024-01-16 DIAGNOSIS — R13.12 OROPHARYNGEAL DYSPHAGIA: Primary | ICD-10-CM

## 2024-01-23 ENCOUNTER — TREATMENT (OUTPATIENT)
Dept: PHYSICAL THERAPY | Facility: CLINIC | Age: 74
End: 2024-01-23
Payer: MEDICARE

## 2024-01-23 DIAGNOSIS — R13.12 OROPHARYNGEAL DYSPHAGIA: Primary | ICD-10-CM

## 2024-01-31 ENCOUNTER — TREATMENT (OUTPATIENT)
Dept: PHYSICAL THERAPY | Facility: CLINIC | Age: 74
End: 2024-01-31
Payer: MEDICARE

## 2024-01-31 DIAGNOSIS — R13.12 OROPHARYNGEAL DYSPHAGIA: Primary | ICD-10-CM

## 2024-02-06 ENCOUNTER — TREATMENT (OUTPATIENT)
Dept: PHYSICAL THERAPY | Facility: CLINIC | Age: 74
End: 2024-02-06
Payer: MEDICARE

## 2024-02-06 DIAGNOSIS — R13.12 OROPHARYNGEAL DYSPHAGIA: Primary | ICD-10-CM

## 2024-10-22 ENCOUNTER — LAB (OUTPATIENT)
Dept: LAB | Facility: HOSPITAL | Age: 74
End: 2024-10-22
Payer: MEDICARE

## 2024-10-22 DIAGNOSIS — C61 PROSTATE CANCER: ICD-10-CM

## 2024-10-22 PROCEDURE — 36415 COLL VENOUS BLD VENIPUNCTURE: CPT

## 2024-10-22 PROCEDURE — 84153 ASSAY OF PSA TOTAL: CPT

## 2024-10-23 LAB — PSA SERPL-MCNC: 0.12 NG/ML (ref 0–4)

## 2024-10-24 ENCOUNTER — OFFICE VISIT (OUTPATIENT)
Dept: UROLOGY | Facility: CLINIC | Age: 74
End: 2024-10-24
Payer: MEDICARE

## 2024-10-24 VITALS — BODY MASS INDEX: 30.81 KG/M2 | WEIGHT: 215.2 LBS | HEIGHT: 70 IN | TEMPERATURE: 97.4 F

## 2024-10-24 DIAGNOSIS — C61 PROSTATE CANCER: Primary | ICD-10-CM

## 2024-12-12 ENCOUNTER — APPOINTMENT (OUTPATIENT)
Dept: CT IMAGING | Facility: HOSPITAL | Age: 74
End: 2024-12-12
Payer: MEDICARE

## 2024-12-12 ENCOUNTER — APPOINTMENT (OUTPATIENT)
Dept: GENERAL RADIOLOGY | Facility: HOSPITAL | Age: 74
End: 2024-12-12
Payer: MEDICARE

## 2024-12-12 ENCOUNTER — HOSPITAL ENCOUNTER (INPATIENT)
Facility: HOSPITAL | Age: 74
LOS: 5 days | Discharge: HOME | End: 2024-12-17
Attending: STUDENT IN AN ORGANIZED HEALTH CARE EDUCATION/TRAINING PROGRAM | Admitting: FAMILY MEDICINE
Payer: MEDICARE

## 2024-12-12 DIAGNOSIS — R09.02 HYPOXIA: ICD-10-CM

## 2024-12-12 DIAGNOSIS — Z74.09 IMPAIRED MOBILITY: ICD-10-CM

## 2024-12-12 DIAGNOSIS — R13.10 DYSPHAGIA, UNSPECIFIED TYPE: ICD-10-CM

## 2024-12-12 DIAGNOSIS — J18.9 PNEUMONIA DUE TO INFECTIOUS ORGANISM, UNSPECIFIED LATERALITY, UNSPECIFIED PART OF LUNG: Primary | ICD-10-CM

## 2024-12-12 LAB
ALBUMIN SERPL-MCNC: 4.2 G/DL (ref 3.5–5.2)
ALBUMIN/GLOB SERPL: 1.1 G/DL
ALP SERPL-CCNC: 99 U/L (ref 39–117)
ALT SERPL W P-5'-P-CCNC: 58 U/L (ref 1–41)
ANION GAP SERPL CALCULATED.3IONS-SCNC: 14 MMOL/L (ref 5–15)
AST SERPL-CCNC: 106 U/L (ref 1–40)
B PARAPERT DNA SPEC QL NAA+PROBE: NOT DETECTED
B PERT DNA SPEC QL NAA+PROBE: NOT DETECTED
BASOPHILS # BLD AUTO: 0.04 10*3/MM3 (ref 0–0.2)
BASOPHILS NFR BLD AUTO: 0.5 % (ref 0–1.5)
BILIRUB SERPL-MCNC: 0.4 MG/DL (ref 0–1.2)
BUN SERPL-MCNC: 18 MG/DL (ref 8–23)
BUN/CREAT SERPL: 32.7 (ref 7–25)
C PNEUM DNA NPH QL NAA+NON-PROBE: NOT DETECTED
CALCIUM SPEC-SCNC: 9.6 MG/DL (ref 8.6–10.5)
CHLORIDE SERPL-SCNC: 101 MMOL/L (ref 98–107)
CO2 SERPL-SCNC: 24 MMOL/L (ref 22–29)
CREAT SERPL-MCNC: 0.55 MG/DL (ref 0.76–1.27)
D DIMER PPP FEU-MCNC: 0.79 MCGFEU/ML (ref 0–0.73)
DEPRECATED RDW RBC AUTO: 46.6 FL (ref 37–54)
EGFRCR SERPLBLD CKD-EPI 2021: 104.6 ML/MIN/1.73
EOSINOPHIL # BLD AUTO: 0.17 10*3/MM3 (ref 0–0.4)
EOSINOPHIL NFR BLD AUTO: 2.2 % (ref 0.3–6.2)
ERYTHROCYTE [DISTWIDTH] IN BLOOD BY AUTOMATED COUNT: 14.1 % (ref 12.3–15.4)
FLUAV SUBTYP SPEC NAA+PROBE: NOT DETECTED
FLUBV RNA ISLT QL NAA+PROBE: NOT DETECTED
GEN 5 1HR TROPONIN T REFLEX: 21 NG/L
GLOBULIN UR ELPH-MCNC: 3.9 GM/DL
GLUCOSE BLDC GLUCOMTR-MCNC: 123 MG/DL (ref 70–130)
GLUCOSE SERPL-MCNC: 125 MG/DL (ref 65–99)
HADV DNA SPEC NAA+PROBE: NOT DETECTED
HCOV 229E RNA SPEC QL NAA+PROBE: NOT DETECTED
HCOV HKU1 RNA SPEC QL NAA+PROBE: NOT DETECTED
HCOV NL63 RNA SPEC QL NAA+PROBE: NOT DETECTED
HCOV OC43 RNA SPEC QL NAA+PROBE: NOT DETECTED
HCT VFR BLD AUTO: 47.3 % (ref 37.5–51)
HGB BLD-MCNC: 14.7 G/DL (ref 13–17.7)
HMPV RNA NPH QL NAA+NON-PROBE: NOT DETECTED
HOLD SPECIMEN: NORMAL
HPIV1 RNA ISLT QL NAA+PROBE: NOT DETECTED
HPIV2 RNA SPEC QL NAA+PROBE: NOT DETECTED
HPIV3 RNA NPH QL NAA+PROBE: NOT DETECTED
HPIV4 P GENE NPH QL NAA+PROBE: NOT DETECTED
IMM GRANULOCYTES # BLD AUTO: 0.02 10*3/MM3 (ref 0–0.05)
IMM GRANULOCYTES NFR BLD AUTO: 0.3 % (ref 0–0.5)
LYMPHOCYTES # BLD AUTO: 1.15 10*3/MM3 (ref 0.7–3.1)
LYMPHOCYTES NFR BLD AUTO: 14.6 % (ref 19.6–45.3)
M PNEUMO IGG SER IA-ACNC: NOT DETECTED
MCH RBC QN AUTO: 28.1 PG (ref 26.6–33)
MCHC RBC AUTO-ENTMCNC: 31.1 G/DL (ref 31.5–35.7)
MCV RBC AUTO: 90.3 FL (ref 79–97)
MONOCYTES # BLD AUTO: 0.89 10*3/MM3 (ref 0.1–0.9)
MONOCYTES NFR BLD AUTO: 11.3 % (ref 5–12)
NEUTROPHILS NFR BLD AUTO: 5.63 10*3/MM3 (ref 1.7–7)
NEUTROPHILS NFR BLD AUTO: 71.1 % (ref 42.7–76)
NRBC BLD AUTO-RTO: 0 /100 WBC (ref 0–0.2)
NT-PROBNP SERPL-MCNC: 325.8 PG/ML (ref 0–900)
PLATELET # BLD AUTO: 324 10*3/MM3 (ref 140–450)
PMV BLD AUTO: 9.4 FL (ref 6–12)
POTASSIUM SERPL-SCNC: 4.6 MMOL/L (ref 3.5–5.2)
PROT SERPL-MCNC: 8.1 G/DL (ref 6–8.5)
RBC # BLD AUTO: 5.24 10*6/MM3 (ref 4.14–5.8)
RHINOVIRUS RNA SPEC NAA+PROBE: NOT DETECTED
RSV RNA NPH QL NAA+NON-PROBE: NOT DETECTED
SARS-COV-2 RNA RESP QL NAA+PROBE: NOT DETECTED
SODIUM SERPL-SCNC: 139 MMOL/L (ref 136–145)
TROPONIN T % DELTA: -25 %
TROPONIN T NUMERIC DELTA: -7 NG/L
TROPONIN T SERPL HS-MCNC: 28 NG/L
WBC NRBC COR # BLD AUTO: 7.9 10*3/MM3 (ref 3.4–10.8)
WHOLE BLOOD HOLD COAG: NORMAL
WHOLE BLOOD HOLD SPECIMEN: NORMAL

## 2024-12-12 PROCEDURE — 71275 CT ANGIOGRAPHY CHEST: CPT

## 2024-12-12 PROCEDURE — 93005 ELECTROCARDIOGRAM TRACING: CPT

## 2024-12-12 PROCEDURE — 36415 COLL VENOUS BLD VENIPUNCTURE: CPT

## 2024-12-12 PROCEDURE — 71045 X-RAY EXAM CHEST 1 VIEW: CPT

## 2024-12-12 PROCEDURE — 99285 EMERGENCY DEPT VISIT HI MDM: CPT

## 2024-12-12 PROCEDURE — 25010000002 CEFTRIAXONE PER 250 MG: Performed by: STUDENT IN AN ORGANIZED HEALTH CARE EDUCATION/TRAINING PROGRAM

## 2024-12-12 PROCEDURE — 83880 ASSAY OF NATRIURETIC PEPTIDE: CPT

## 2024-12-12 PROCEDURE — 25510000001 IOPAMIDOL PER 1 ML: Performed by: STUDENT IN AN ORGANIZED HEALTH CARE EDUCATION/TRAINING PROGRAM

## 2024-12-12 PROCEDURE — 0202U NFCT DS 22 TRGT SARS-COV-2: CPT | Performed by: STUDENT IN AN ORGANIZED HEALTH CARE EDUCATION/TRAINING PROGRAM

## 2024-12-12 PROCEDURE — 80053 COMPREHEN METABOLIC PANEL: CPT

## 2024-12-12 PROCEDURE — 82948 REAGENT STRIP/BLOOD GLUCOSE: CPT

## 2024-12-12 PROCEDURE — 93010 ELECTROCARDIOGRAM REPORT: CPT | Performed by: INTERNAL MEDICINE

## 2024-12-12 PROCEDURE — 84484 ASSAY OF TROPONIN QUANT: CPT

## 2024-12-12 PROCEDURE — 85379 FIBRIN DEGRADATION QUANT: CPT | Performed by: STUDENT IN AN ORGANIZED HEALTH CARE EDUCATION/TRAINING PROGRAM

## 2024-12-12 PROCEDURE — 85025 COMPLETE CBC W/AUTO DIFF WBC: CPT

## 2024-12-12 RX ORDER — IOPAMIDOL 755 MG/ML
100 INJECTION, SOLUTION INTRAVASCULAR
Status: COMPLETED | OUTPATIENT
Start: 2024-12-12 | End: 2024-12-12

## 2024-12-12 RX ORDER — SODIUM CHLORIDE 0.9 % (FLUSH) 0.9 %
10 SYRINGE (ML) INJECTION AS NEEDED
Status: DISCONTINUED | OUTPATIENT
Start: 2024-12-12 | End: 2024-12-13 | Stop reason: SDUPTHER

## 2024-12-12 RX ADMIN — IOPAMIDOL 100 ML: 755 INJECTION, SOLUTION INTRAVENOUS at 22:25

## 2024-12-12 RX ADMIN — SODIUM CHLORIDE 1000 MG: 900 INJECTION INTRAVENOUS at 20:13

## 2024-12-12 NOTE — Clinical Note
Level of Care: Telemetry [5]   Diagnosis: Pneumonia [[id number]]   Admitting Physician: GRISELDA UREÑA [[id number]]   Attending Physician: GRISELDA UREÑA [[id number]]   Certification: I Certify That Inpatient Hospital Services Are Medically Necessary For Greater Than 2 Midnights

## 2024-12-13 ENCOUNTER — APPOINTMENT (OUTPATIENT)
Dept: GENERAL RADIOLOGY | Facility: HOSPITAL | Age: 74
End: 2024-12-13
Payer: MEDICARE

## 2024-12-13 LAB
ALBUMIN SERPL-MCNC: 3.7 G/DL (ref 3.5–5.2)
ALBUMIN/GLOB SERPL: 1.1 G/DL
ALP SERPL-CCNC: 84 U/L (ref 39–117)
ALT SERPL W P-5'-P-CCNC: 46 U/L (ref 1–41)
ANION GAP SERPL CALCULATED.3IONS-SCNC: 12 MMOL/L (ref 5–15)
AST SERPL-CCNC: 83 U/L (ref 1–40)
BILIRUB SERPL-MCNC: 0.4 MG/DL (ref 0–1.2)
BUN SERPL-MCNC: 15 MG/DL (ref 8–23)
BUN/CREAT SERPL: 40.5 (ref 7–25)
CALCIUM SPEC-SCNC: 9.1 MG/DL (ref 8.6–10.5)
CHLORIDE SERPL-SCNC: 103 MMOL/L (ref 98–107)
CO2 SERPL-SCNC: 24 MMOL/L (ref 22–29)
CREAT SERPL-MCNC: 0.37 MG/DL (ref 0.76–1.27)
DEPRECATED RDW RBC AUTO: 46 FL (ref 37–54)
EGFRCR SERPLBLD CKD-EPI 2021: 118 ML/MIN/1.73
ERYTHROCYTE [DISTWIDTH] IN BLOOD BY AUTOMATED COUNT: 14.3 % (ref 12.3–15.4)
GLOBULIN UR ELPH-MCNC: 3.4 GM/DL
GLUCOSE BLDC GLUCOMTR-MCNC: 151 MG/DL (ref 70–130)
GLUCOSE BLDC GLUCOMTR-MCNC: 226 MG/DL (ref 70–130)
GLUCOSE BLDC GLUCOMTR-MCNC: 229 MG/DL (ref 70–130)
GLUCOSE BLDC GLUCOMTR-MCNC: 82 MG/DL (ref 70–130)
GLUCOSE BLDC GLUCOMTR-MCNC: 87 MG/DL (ref 70–130)
GLUCOSE SERPL-MCNC: 97 MG/DL (ref 65–99)
HCT VFR BLD AUTO: 42.7 % (ref 37.5–51)
HGB BLD-MCNC: 13.6 G/DL (ref 13–17.7)
MCH RBC QN AUTO: 28 PG (ref 26.6–33)
MCHC RBC AUTO-ENTMCNC: 31.9 G/DL (ref 31.5–35.7)
MCV RBC AUTO: 88 FL (ref 79–97)
PLATELET # BLD AUTO: 298 10*3/MM3 (ref 140–450)
PMV BLD AUTO: 9.2 FL (ref 6–12)
POTASSIUM SERPL-SCNC: 4 MMOL/L (ref 3.5–5.2)
PROT SERPL-MCNC: 7.1 G/DL (ref 6–8.5)
RBC # BLD AUTO: 4.85 10*6/MM3 (ref 4.14–5.8)
SODIUM SERPL-SCNC: 139 MMOL/L (ref 136–145)
WBC NRBC COR # BLD AUTO: 8.52 10*3/MM3 (ref 3.4–10.8)

## 2024-12-13 PROCEDURE — 82948 REAGENT STRIP/BLOOD GLUCOSE: CPT

## 2024-12-13 PROCEDURE — 25010000002 AMPICILLIN-SULBACTAM PER 1.5 G: Performed by: STUDENT IN AN ORGANIZED HEALTH CARE EDUCATION/TRAINING PROGRAM

## 2024-12-13 PROCEDURE — 25010000002 ENOXAPARIN PER 10 MG: Performed by: STUDENT IN AN ORGANIZED HEALTH CARE EDUCATION/TRAINING PROGRAM

## 2024-12-13 PROCEDURE — 63710000001 INSULIN LISPRO (HUMAN) PER 5 UNITS

## 2024-12-13 PROCEDURE — 74230 X-RAY XM SWLNG FUNCJ C+: CPT

## 2024-12-13 PROCEDURE — 36415 COLL VENOUS BLD VENIPUNCTURE: CPT

## 2024-12-13 PROCEDURE — 92610 EVALUATE SWALLOWING FUNCTION: CPT | Performed by: SPEECH-LANGUAGE PATHOLOGIST

## 2024-12-13 PROCEDURE — 80053 COMPREHEN METABOLIC PANEL: CPT

## 2024-12-13 PROCEDURE — 25010000002 METHYLPREDNISOLONE PER 40 MG: Performed by: STUDENT IN AN ORGANIZED HEALTH CARE EDUCATION/TRAINING PROGRAM

## 2024-12-13 PROCEDURE — 94799 UNLISTED PULMONARY SVC/PX: CPT

## 2024-12-13 PROCEDURE — 92611 MOTION FLUOROSCOPY/SWALLOW: CPT | Performed by: SPEECH-LANGUAGE PATHOLOGIST

## 2024-12-13 PROCEDURE — 94664 DEMO&/EVAL PT USE INHALER: CPT

## 2024-12-13 PROCEDURE — 85027 COMPLETE CBC AUTOMATED: CPT

## 2024-12-13 PROCEDURE — 97535 SELF CARE MNGMENT TRAINING: CPT | Performed by: SPEECH-LANGUAGE PATHOLOGIST

## 2024-12-13 PROCEDURE — 94761 N-INVAS EAR/PLS OXIMETRY MLT: CPT

## 2024-12-13 PROCEDURE — 94640 AIRWAY INHALATION TREATMENT: CPT

## 2024-12-13 RX ORDER — NICOTINE POLACRILEX 4 MG
15 LOZENGE BUCCAL
Status: DISCONTINUED | OUTPATIENT
Start: 2024-12-13 | End: 2024-12-17 | Stop reason: HOSPADM

## 2024-12-13 RX ORDER — LEVOTHYROXINE SODIUM 25 UG/1
25 TABLET ORAL
Status: DISCONTINUED | OUTPATIENT
Start: 2024-12-13 | End: 2024-12-17 | Stop reason: HOSPADM

## 2024-12-13 RX ORDER — ONDANSETRON 2 MG/ML
4 INJECTION INTRAMUSCULAR; INTRAVENOUS EVERY 6 HOURS PRN
Status: DISCONTINUED | OUTPATIENT
Start: 2024-12-13 | End: 2024-12-17 | Stop reason: HOSPADM

## 2024-12-13 RX ORDER — FAMOTIDINE 20 MG/1
20 TABLET, FILM COATED ORAL 2 TIMES DAILY
Status: DISCONTINUED | OUTPATIENT
Start: 2024-12-13 | End: 2024-12-17 | Stop reason: HOSPADM

## 2024-12-13 RX ORDER — IPRATROPIUM BROMIDE AND ALBUTEROL SULFATE 2.5; .5 MG/3ML; MG/3ML
3 SOLUTION RESPIRATORY (INHALATION) EVERY 6 HOURS PRN
Status: DISCONTINUED | OUTPATIENT
Start: 2024-12-13 | End: 2024-12-14

## 2024-12-13 RX ORDER — METHYLPREDNISOLONE SODIUM SUCCINATE 40 MG/ML
40 INJECTION, POWDER, LYOPHILIZED, FOR SOLUTION INTRAMUSCULAR; INTRAVENOUS DAILY
Status: DISCONTINUED | OUTPATIENT
Start: 2024-12-13 | End: 2024-12-16

## 2024-12-13 RX ORDER — DICLOFENAC SODIUM 75 MG/1
1 TABLET, DELAYED RELEASE ORAL 2 TIMES DAILY
COMMUNITY
Start: 2024-12-13

## 2024-12-13 RX ORDER — PANTOPRAZOLE SODIUM 40 MG/1
40 TABLET, DELAYED RELEASE ORAL
Status: DISCONTINUED | OUTPATIENT
Start: 2024-12-13 | End: 2024-12-17 | Stop reason: HOSPADM

## 2024-12-13 RX ORDER — INSULIN GLARGINE 100 [IU]/ML
15 INJECTION, SOLUTION SUBCUTANEOUS 2 TIMES DAILY
Status: ON HOLD | COMMUNITY
End: 2024-12-17

## 2024-12-13 RX ORDER — ONDANSETRON 4 MG/1
4 TABLET, ORALLY DISINTEGRATING ORAL EVERY 6 HOURS PRN
Status: DISCONTINUED | OUTPATIENT
Start: 2024-12-13 | End: 2024-12-17 | Stop reason: HOSPADM

## 2024-12-13 RX ORDER — HEPARIN SODIUM 5000 [USP'U]/ML
5000 INJECTION, SOLUTION INTRAVENOUS; SUBCUTANEOUS EVERY 12 HOURS SCHEDULED
Status: DISCONTINUED | OUTPATIENT
Start: 2024-12-13 | End: 2024-12-13

## 2024-12-13 RX ORDER — LISINOPRIL 10 MG/1
10 TABLET ORAL DAILY
Status: DISCONTINUED | OUTPATIENT
Start: 2024-12-13 | End: 2024-12-17 | Stop reason: HOSPADM

## 2024-12-13 RX ORDER — SODIUM CHLORIDE 9 MG/ML
40 INJECTION, SOLUTION INTRAVENOUS AS NEEDED
Status: DISCONTINUED | OUTPATIENT
Start: 2024-12-13 | End: 2024-12-17 | Stop reason: HOSPADM

## 2024-12-13 RX ORDER — DEXTROSE MONOHYDRATE 25 G/50ML
25 INJECTION, SOLUTION INTRAVENOUS
Status: DISCONTINUED | OUTPATIENT
Start: 2024-12-13 | End: 2024-12-17 | Stop reason: HOSPADM

## 2024-12-13 RX ORDER — SODIUM CHLORIDE 0.9 % (FLUSH) 0.9 %
10 SYRINGE (ML) INJECTION AS NEEDED
Status: DISCONTINUED | OUTPATIENT
Start: 2024-12-13 | End: 2024-12-17 | Stop reason: HOSPADM

## 2024-12-13 RX ORDER — ENOXAPARIN SODIUM 100 MG/ML
40 INJECTION SUBCUTANEOUS DAILY
Status: DISCONTINUED | OUTPATIENT
Start: 2024-12-13 | End: 2024-12-17 | Stop reason: HOSPADM

## 2024-12-13 RX ORDER — INSULIN LISPRO 100 [IU]/ML
2-9 INJECTION, SOLUTION INTRAVENOUS; SUBCUTANEOUS
Status: DISCONTINUED | OUTPATIENT
Start: 2024-12-13 | End: 2024-12-17 | Stop reason: HOSPADM

## 2024-12-13 RX ORDER — OMEPRAZOLE 40 MG/1
1 CAPSULE, DELAYED RELEASE ORAL DAILY
COMMUNITY

## 2024-12-13 RX ORDER — ASPIRIN 81 MG/1
81 TABLET ORAL DAILY
Status: DISCONTINUED | OUTPATIENT
Start: 2024-12-13 | End: 2024-12-17 | Stop reason: HOSPADM

## 2024-12-13 RX ORDER — SODIUM CHLORIDE 0.9 % (FLUSH) 0.9 %
10 SYRINGE (ML) INJECTION EVERY 12 HOURS SCHEDULED
Status: DISCONTINUED | OUTPATIENT
Start: 2024-12-13 | End: 2024-12-17 | Stop reason: HOSPADM

## 2024-12-13 RX ADMIN — BARIUM SULFATE 25 ML: 400 PASTE ORAL at 09:25

## 2024-12-13 RX ADMIN — ENOXAPARIN SODIUM 40 MG: 100 INJECTION SUBCUTANEOUS at 10:01

## 2024-12-13 RX ADMIN — FAMOTIDINE 20 MG: 20 TABLET, FILM COATED ORAL at 02:20

## 2024-12-13 RX ADMIN — AMPICILLIN SODIUM, SULBACTAM SODIUM 3 G: 2; 1 INJECTION, POWDER, FOR SOLUTION INTRAMUSCULAR; INTRAVENOUS at 10:01

## 2024-12-13 RX ADMIN — LINAGLIPTIN 5 MG: 5 TABLET, FILM COATED ORAL at 10:00

## 2024-12-13 RX ADMIN — LISINOPRIL 10 MG: 10 TABLET ORAL at 10:05

## 2024-12-13 RX ADMIN — METFORMIN HYDROCHLORIDE 500 MG: 500 TABLET, FILM COATED ORAL at 10:00

## 2024-12-13 RX ADMIN — Medication 10 ML: at 10:01

## 2024-12-13 RX ADMIN — BARIUM SULFATE 60 ML: 400 SUSPENSION ORAL at 09:25

## 2024-12-13 RX ADMIN — Medication 10 ML: at 21:17

## 2024-12-13 RX ADMIN — ASPIRIN 81 MG: 81 TABLET, COATED ORAL at 10:00

## 2024-12-13 RX ADMIN — METFORMIN HYDROCHLORIDE 500 MG: 500 TABLET, FILM COATED ORAL at 21:08

## 2024-12-13 RX ADMIN — AMPICILLIN SODIUM, SULBACTAM SODIUM 3 G: 2; 1 INJECTION, POWDER, FOR SOLUTION INTRAMUSCULAR; INTRAVENOUS at 21:08

## 2024-12-13 RX ADMIN — METHYLPREDNISOLONE SODIUM SUCCINATE 40 MG: 40 INJECTION, POWDER, FOR SOLUTION INTRAMUSCULAR; INTRAVENOUS at 10:00

## 2024-12-13 RX ADMIN — Medication 10 ML: at 02:24

## 2024-12-13 RX ADMIN — INSULIN LISPRO 4 UNITS: 100 INJECTION, SOLUTION INTRAVENOUS; SUBCUTANEOUS at 12:09

## 2024-12-13 RX ADMIN — FLUOXETINE HYDROCHLORIDE 20 MG: 20 CAPSULE ORAL at 10:00

## 2024-12-13 RX ADMIN — BARIUM SULFATE 55 ML: 0.81 POWDER, FOR SUSPENSION ORAL at 09:25

## 2024-12-13 RX ADMIN — AMPICILLIN SODIUM, SULBACTAM SODIUM 3 G: 2; 1 INJECTION, POWDER, FOR SOLUTION INTRAMUSCULAR; INTRAVENOUS at 14:53

## 2024-12-13 RX ADMIN — INSULIN LISPRO 4 UNITS: 100 INJECTION, SOLUTION INTRAVENOUS; SUBCUTANEOUS at 21:53

## 2024-12-13 RX ADMIN — IPRATROPIUM BROMIDE AND ALBUTEROL SULFATE 3 ML: .5; 3 SOLUTION RESPIRATORY (INHALATION) at 16:16

## 2024-12-13 RX ADMIN — LEVOTHYROXINE SODIUM 25 MCG: 0.03 TABLET ORAL at 05:49

## 2024-12-13 RX ADMIN — EMPAGLIFLOZIN 25 MG: 25 TABLET, FILM COATED ORAL at 10:00

## 2024-12-13 RX ADMIN — FAMOTIDINE 20 MG: 20 TABLET, FILM COATED ORAL at 21:08

## 2024-12-13 RX ADMIN — FAMOTIDINE 20 MG: 20 TABLET, FILM COATED ORAL at 12:10

## 2024-12-13 RX ADMIN — DOXYCYCLINE 100 MG: 100 INJECTION, POWDER, LYOPHILIZED, FOR SOLUTION INTRAVENOUS at 02:20

## 2024-12-13 NOTE — ED PROVIDER NOTES
Subjective   History of Present Illness  This is a 74 y.o M with hx of muscular dystrophy, DM, as well as HTN in the emergency room sent by PCP for concerns of hypoxia, difficulty breathing s/p episode of concerning aspiration on Monday. PT has a history of GERD, and while on the couch had an episode of aspiration. Positive for dyspnea and hypoxia, sent here by PMD. Negative for lower ext swelling or palpitations. No chest pain or fever reported.         Review of Systems   Respiratory:  Positive for shortness of breath.    All other systems reviewed and are negative.      Past Medical History:   Diagnosis Date    Cancer     PROSTATE CANCER     Colon polyps     Cystitis     Depression     Diabetes mellitus     Enlarged prostate     Esophagitis     GERD (gastroesophageal reflux disease)     [place] (hard of hearing)     Hypertension     Kidney stone     OPCD (olivopontocerebellar degeneration)     PONV (postoperative nausea and vomiting)     Skin cancer     Varicose vein of leg        No Known Allergies    Past Surgical History:   Procedure Laterality Date    COLONOSCOPY N/A 9/19/2022    Procedure: COLONOSCOPY WITH ANESTHESIA;  Surgeon: Adair Beltran MD;  Location: [facility] ENDOSCOPY;  Service: Gastroenterology;  Laterality: N/A;  preop; hx of polyps  postop; polyps   PCP Marcelino Weaver     ENDOSCOPY N/A 9/19/2022    Procedure: ESOPHAGOGASTRODUODENOSCOPY WITH ANESTHESIA;  Surgeon: Adair Beltran MD;  Location: [facility] ENDOSCOPY;  Service: Gastroenterology;  Laterality: N/A;  preop; dysphagia  postop; normal   PCP Marcelino Weaver     ENDOSCOPY AND COLONOSCOPY  11/13/2015    post gastric bypass, dilation, very small internal hemorroid    EYE SURGERY Bilateral     Cataract    GASTRIC BYPASS      NASAL SEPTUM SURGERY      NECK SURGERY      ORIF TIBIA/FIBULA FRACTURES Right     PROSTATE BIOPSY N/A 05/13/2021    Procedure: PROSTATE ULTRASOUND BIOPSY MRI FUSION WITH URONAV;  Surgeon: Gregorio Lange MD;  Location:   [facility] OR;  Service: Urology;  Laterality: N/A;    VEIN LIGATION AND STRIPPING         Family History   Problem Relation Age of Onset    Colon polyps Mother     Colon polyps Brother     Colon cancer Neg Hx        Social History     Socioeconomic History    Marital status:    Tobacco Use    Smoking status: Never    Smokeless tobacco: Never   Vaping Use    Vaping status: Never Used   Substance and Sexual Activity    Alcohol use: Yes     Comment: occ    Drug use: Never    Sexual activity: Defer           Objective   Physical Exam  Constitutional:       General: He is not in acute distress.  HENT:      Head: Normocephalic.      Mouth/Throat:      Mouth: Mucous membranes are moist.   Eyes:      Pupils: Pupils are equal, round, and reactive to light.   Neck:      Vascular: No JVD.   Cardiovascular:      Rate and Rhythm: Normal rate and regular rhythm.      Pulses: Normal pulses.      Heart sounds: No murmur heard.  Pulmonary:      Effort: Pulmonary effort is normal. No tachypnea or respiratory distress.      Breath sounds: No stridor.   Chest:      Chest wall: No tenderness.   Abdominal:      General: Bowel sounds are normal.      Palpations: Abdomen is soft.      Tenderness: There is no abdominal tenderness. There is no guarding.   Musculoskeletal:         General: Normal range of motion.      Cervical back: Normal range of motion and neck supple.      Right lower leg: No tenderness. No edema.      Left lower leg: No edema.   Skin:     General: Skin is warm.      Capillary Refill: Capillary refill takes less than 2 seconds.   Neurological:      General: No focal deficit present.      Mental Status: He is alert and oriented to person, place, and time.      Cranial Nerves: No cranial nerve deficit.         Procedures           ED Course  ED Course as of 12/13/24 0454   Fri Dec 13, 2024   [id number] This is a Dr. Weaver patient call out was done. PT accepted by  [facility].  [SG]      ED Course User Index  [SG]  Blaine Abarca MD                                                       Medical Decision Making  This is a 74 y.o M with hx of HTN, DM, muscular dystrophy positive for hypoxia and dyspnea, that resolved with NC. Positive for concerns of aspiration pneumonia sent by PMD Pending work up, including XR chest, cbc, chemistry, as well as BNP.     Blood work reassuring. Because of elevated ddimer, CTA ordered. BUN and creatinine stable    Mild elevation of LFT. No jaundice or abd pain. CTA negative for PE, concerning for infectious pattern. Case discussed with Dr. Alarcon who accepted admit for concerns of pneumonia with hypoxia necessitating Oxygen.     PT admitted to wrong provided he is a private patient of Dr. Weaver, call out was done to [facility].     Problems Addressed:  Hypoxia: complicated acute illness or injury  Pneumonia due to infectious organism, unspecified laterality, unspecified part of lung: complicated acute illness or injury    Amount and/or Complexity of Data Reviewed  Labs: ordered.  Radiology: ordered.    Risk  Prescription drug management.  Decision regarding hospitalization.        Final diagnoses:   Pneumonia due to infectious organism, unspecified laterality, unspecified part of lung   Hypoxia       ED Disposition  ED Disposition       ED Disposition   Decision to Admit    Condition   --    Comment   Level of Care: Telemetry [5]   Diagnosis: Pneumonia [[id number]]   Admitting Physician: BRENDA WEAVER [[id number]]   Attending Physician: BRENDA WEAVER [[id number]]   Certification: I Certify That Inpatient Hospital Services Are Medically Necessary For Greater Than 2 Midnights                 No follow-up provider specified.       Medication List      No changes were made to your prescriptions during this visit.            Blaine Abarca MD  12/13/24 0424       Blaine Abarca MD  12/13/24 0457

## 2024-12-13 NOTE — MBS/VFSS/FEES
Acute Care - Speech Language Pathology   Swallow Re-Evaluation [facility]     Patient Name: Richard NOLAN Jr.  : 1950  MRN: [id number]  Today's Date: 2024               Admit Date: 2024  SPEECH-LANGUAGE PATHOLOGY EVALUTION - MBS/VFSS  Subjective: The patient was seen on this date for a VFSS(Videofluoroscopic Swallowing Study).  Patient was alert and cooperative.    Significant history: Hypoxia and concern for aspiration due to reflux. History of muscular dystrophy, DM , HTN, GERD, prior endoscopy with esophageal dilations x2. Prior swallow study on 2023 with residue as main concern but remained on regular diet with thin liquids.   Patient report this is the first instance of hospitalization with pneumonia since diagnosis of muscular dystropy. He reports that eating and cooking is very important to him. He reports weight loss recently.   Objective: Risks/benefits were reviewed with the patient, and consent was obtained. Oral motor examination results: Left labial weakness is observed. Patient reports this is new. He also has a wet cough prior to any PO. Weak volitional swallow and weak cough ability. The study was completed with SLP and Radiologist present. The patient was seen in lateral view(s). Textures given included thin liquid, nectar thick liquid, honey thick liquid, puree consistency, mechanical soft consistency, and regular consistency.  Assessment: Difficulties were noted with all consistencies, characterized by significant pharyngeal residue. Penetration with nectar and thin. Aspiration of thin residue.     Observations:    Trial 1: Honey Thick - Spoon  Functional oral acceptance with reduced oral control. Premature loss to the lateral channels. Swallow completed with no penetration or aspiration.  Moderate oral, base of tongue, vallecular, and mild pyriform residue. 2x multiple swallow clears a partial amount of the residue. No oral residue, mild vallecular, lateral channel, and  pyriform residue remains.      Trial 2: Extra swallow  An additional extra swallow clears lateral channel and pyriform residue to a trace amount. Mild vallecular residue remains.     Delayed coughing following this trial. No observed penetration or aspiration.      Trial 3: Nectar Thick - Straw  Functional oral acceptance with improved oral control. No premature spillage noted.  Swallow completed with questionable trace penetration  during the swallow vs undercoating of the epiglottis. No aspiration. Trace base of tonuge, vallecular, lateral channel, and pyriform residue remains.      Trial 4: Thin Liquid - Straw  Functional oral acceptance with improved oral control. No premature loss. Swallow completed with penetration during the swallow but no aspiration. Mild base of tongue, vallecular, lateral channel, and pyriform residue remains. Extra swallow only partially effective in residue clearance.      Trial 5: Pudding Thick Liquid - Spoon  Functional oral acceptance with improved oral control. Swallow completed with no new penetration or aspiration. With initial swallow none of the pudding thick barium passes through the UES. Profound pyriform residue. Patient quickly completes extra swallow which then is effective in clearing barium from the pyriforms and into the esophagus. Trace penetration of the residue is observed vs prior penetrated thin barium that was remaining in the vestiuble, difficult to discern. Only trace lingual, vallecular, and pyriform residue remains.      Trial 6: Soft Solids  Functional oral acceptance and mastication of the soft solid. Mildly reduced control with loss to the pyriforms. Swallow completed with no penetration or aspiration. Solid clears through the pharynx with no increased residue. Only trace vallecular, lateral channel, and pyriform residue remains.     Delayed coughing following this trial. No observed penetration or aspiration.      Trial 7: Regular Solids  Functional oral  acceptance and increased time for mastication of the regular solid. Mildly reduced control with reduced cohesion of the bolus in the oral phase and uncoordinated piecemeal swallows. Swallow completed with no penetration or aspiration. Solid clears through the pharynx with a multiple swallow. Only trace vallecular, lateral channel, and pyriform residue remains.      Trial 8: Thin Liquid - Straw  Functional oral acceptance with reduced oral control. Premature loss to the pyriforms. Swallow completed with no penetration or aspiration. Mild lingual, base of tongue, vallecular, lateral channel, and pyriform residue remains.      Trial 9: Extra swallow  Extra swallow completed to assist with residue clearance. Aspiration of the pyriform residue is observed through the interarytnoid space. Delayed coughing. Mild lingual, vallecular, lateral channel, and pyriform residue remains.      Trial 10: Thin Liquid - Straw  Functional oral acceptance with improved oral control. No premature loss. Swallow completed with trace penetration during the swallow but no aspiration. Mild base of tongue, vallecular, lateral channel, and pyriform residue remains. Extra swallow only partially effective in residue clearance.      Trial 11: Chin tuck  Cued chin tuck to attempt residue clearance. No more effective in residue clearance.      Trial 12: Nectar Thick - Straw  Functional oral acceptance with improved oral control. No premature loss. Swallow completed with no penetration or aspiration. Moderate vallecular, mild lateral channel, and pyriform residue remains.     Across trials the patient is observed to have globally weak swallow function. There is reduced laryngeal elevation, reduced to no epiglottic inversion, and reduced posterior pharyngeal wall stripping wave. Fullness of the base of tongue noted.     Esophageal screen was not performed.     SLP Findings: Patient presents with chronic severe oropharyngeal dysphagia.   Comments:  Extensive discussion with patient regarding results of study and risk of aspiration with any PO intake. NPO is safest option at this point in time. He reports he values eating at this juncture. He is open to discussion of feeding tube placement if MD feels this is warranted. I discussed that this does not eliminate the risk of aspiration from reflux. We discussed progressive nature of muscular dystrophy and risk of respiratory failure secondary to dysphagia and aspiration given diagnosis. He would like to further discuss this with SLP and MD. For now he would like to continue diet of soft solids and nectar thick liquids with close monitoring.   Recommendations: Diet Textures: soft to chew ground meats and nectar thick liquids. Medications should be taken whole with puree. May have ice chips between meals and/or after oral care, under staff or family supervision and with the recommended strategies for safe swallowing.  Recommended Strategies: upright for PO, small bites and sips, alternate liquids and solids, and double swallow with all intake . Oral care before meals and PRN.  Other Recommended Evaluations: None. Further discussion with MD regarding goals and plan of care.     Dysphagia therapy is recommended for education and compensations.     Ruby Cruz, MS CCC-SLP 12/13/2024 13:00 CST    Visit Dx:     ICD-10-CM ICD-9-CM   1. Pneumonia due to infectious organism, unspecified laterality, unspecified part of lung  J18.9 486   2. Hypoxia  R09.02 799.02   3. Dysphagia, unspecified type  R13.10 787.20     Patient Active Problem List   Diagnosis    Prostate cancer    Non-smoker    Urinary frequency    Aspirin long-term use    History of radiation therapy    Pharyngoesophageal dysphagia    Hx of colonic polyp    Family hx of colon cancer    Pneumonia     Past Medical History:   Diagnosis Date    Cancer     PROSTATE CANCER     Colon polyps     Cystitis     Depression     Diabetes mellitus     Enlarged prostate      Esophagitis     GERD (gastroesophageal reflux disease)     [place] (hard of hearing)     Hypertension     Kidney stone     OPCD (olivopontocerebellar degeneration)     PONV (postoperative nausea and vomiting)     Skin cancer     Varicose vein of leg      Past Surgical History:   Procedure Laterality Date    COLONOSCOPY N/A 9/19/2022    Procedure: COLONOSCOPY WITH ANESTHESIA;  Surgeon: Adair Beltran MD;  Location: [facility] ENDOSCOPY;  Service: Gastroenterology;  Laterality: N/A;  preop; hx of polyps  postop; polyps   PCP Marcelino Weaver     ENDOSCOPY N/A 9/19/2022    Procedure: ESOPHAGOGASTRODUODENOSCOPY WITH ANESTHESIA;  Surgeon: Adair Beltran MD;  Location: [facility] ENDOSCOPY;  Service: Gastroenterology;  Laterality: N/A;  preop; dysphagia  postop; normal   PCP Marcelino Weaver     ENDOSCOPY AND COLONOSCOPY  11/13/2015    post gastric bypass, dilation, very small internal hemorroid    EYE SURGERY Bilateral     Cataract    GASTRIC BYPASS      NASAL SEPTUM SURGERY      NECK SURGERY      ORIF TIBIA/FIBULA FRACTURES Right     PROSTATE BIOPSY N/A 05/13/2021    Procedure: PROSTATE ULTRASOUND BIOPSY MRI FUSION WITH URONAV;  Surgeon: Gregorio Lange MD;  Location: [facility] OR;  Service: Urology;  Laterality: N/A;    VEIN LIGATION AND STRIPPING         SLP Recommendation and Plan  SLP Swallowing Diagnosis: suspect chronic, mod-severe, oral dysphagia, pharyngeal dysphagia (with likely esophageal component) (12/13/24 1010)  SLP Diet Recommendation: soft to chew textures, chopped, nectar thick liquids, ice chips between meals after oral care, with supervision (vs discussion about NPO as safest and PEG tube) (12/13/24 1010)  Recommended Precautions and Strategies: upright posture during/after eating, small bites of food and sips of liquid, multiple swallows per bite of food, multiple swallows per sip of liquid, general aspiration precautions, volitional throat clear, fatigue precautions, reflux precautions (12/13/24  1010)  SLP Rec. for Method of Medication Administration: meds whole, meds crushed, with puree (12/13/24 1010)     Monitor for Signs of Aspiration: yes, notify SLP if any concerns (12/13/24 1010)     Swallow Criteria for Skilled Therapeutic Interventions Met: no problems identified which require skilled intervention (12/13/24 1010)  Anticipated Discharge Disposition (SLP): home with home health (12/13/24 1010)  Rehab Potential/Prognosis, Swallowing: adequate, monitor progress closely (12/13/24 1010)  Therapy Frequency (Swallow): at least, 2 days per week (12/13/24 1010)  Predicted Duration Therapy Intervention (Days): until discharge (12/13/24 1010)  Oral Care Recommendations: Oral Care before breakfast, after meals and PRN, Toothbrush (12/13/24 1010)                                        Progress: improving      SWALLOW EVALUATION (Last 72 Hours)       SLP Adult Swallow Evaluation       Row Name 12/13/24 1010 12/13/24 0712                Rehab Evaluation    Document Type re-evaluation  -MG evaluation  -BN       Subjective Information no complaints  -MG no complaints  -BN       Patient Observations alert;cooperative;agree to therapy  -MG alert;cooperative  -BN       Patient/Family/Caregiver Comments/Observations -- no family present  -BN       Patient Effort good  -MG good  -BN       Symptoms Noted, Comment n  -MG --          General Information    Patient Profile Reviewed yes  -MG yes  -BN       Pertinent History Of Current Problem Hypoxia and concern for aspiration due to reflux. History of muscular dystrophy, DM , HTN, GERD, prior endoscopy with esophageal dilations x2. Prior swallow study on 11/28/2023 with residue as main concern but remained on regular diet with thin liquids.  -MG acute pneumonia. Hx of muscular dystrophy, HTN, [place], DM2, GERD, prostate and skin CA.  -BN       Current Method of Nutrition thin liquids;soft to chew textures  -MG thin liquids;soft to chew textures  -BN        Precautions/Limitations, Vision WFL with corrective lenses  -MG WFL with corrective lenses  -BN       Precautions/Limitations, Hearing hearing aid, right;hearing aid, left  -MG hearing aid, right;hearing aid, left  -BN       Prior Level of Function-Communication WFL  -MG WFL  -BN       Prior Level of Function-Swallowing no diet consistency restrictions  -MG no diet consistency restrictions  -BN       Plans/Goals Discussed with patient  -MG patient  -BN       Barriers to Rehab medically complex  -MG none identified  -BN       Patient's Goals for Discharge --  reports he would consider a feeding tube if needed in the future  -MG patient did not state  -BN          Pain    Pretreatment Pain Rating -- 0/10 - no pain  -BN       Posttreatment Pain Rating -- 0/10 - no pain  -BN       Additional Documentation Pain Scale: FACES Pre/Post-Treatment (Group)  -MG --          Pain Scale: FACES Pre/Post-Treatment    Pain: FACES Scale, Pretreatment 0-->no hurt  -MG --       Posttreatment Pain Rating 0-->no hurt  -MG --          Oral Motor Structure and Function    Dentition Assessment lower dentures/partial in place;upper dentures/partial in place  -MG lower dentures/partial in place;upper dentures/partial in place  -BN       Secretion Management WNL/WFL  -MG WNL/WFL  -BN       Mucosal Quality moist, healthy  -MG moist, healthy  -BN       Volitional Swallow weak  -MG --       Volitional Cough weak  -MG --          Oral Musculature and Cranial Nerve Assessment    Oral Motor General Assessment oral labial or buccal impairment  -MG oral labial or buccal impairment  -BN       Oral Labial or Buccal Impairment, Detail, Cranial Nerve VII (Facial): left labial droop  -MG left labial droop  -BN          General Eating/Swallowing Observations    Respiratory Support Currently in Use nasal cannula  -MG nasal cannula  -BN       Eating/Swallowing Skills -- fed by SLP  -BN       Positioning During Eating -- upright in bed  -BN       Utensils Used  -- spoon;straw  -BN       Consistencies Trialed -- regular textures;pudding thick;honey-thick liquids;nectar/syrup-thick liquids;thin liquids  -BN          Clinical Swallow Eval    Oral Prep Phase -- WFL  -BN       Oral Transit -- WFL  -BN       Oral Residue -- WFL  -BN       Pharyngeal Phase -- suspected pharyngeal impairment  -BN       Esophageal Phase -- unremarkable  -BN       Clinical Swallow Evaluation Summary -- ST clinical bedside swallow evaluation completed. Pt admitted for acute pneumonia. Hx of muscular dystrophy, HTN, [place], DM2, GERD, prostate and skin CA. Per H&P, pt had instance of suspected aspiration. Pt reports aspiration did not occur during PO but while sleeping in reclinder secondary to reflux. Pt oral mech exam does reveal left sided labial droop. RN notified. Upper and lower dentures placed for PO trials as well as hearing aids bilaterally. Congested cough noted prior to any PO. Pt does have hx of dysphagia with prior MBS completed on 11/28/23 and outpt speech therapy services. Pt reports not currently recieving any speech services and no altered diet at this time. Pt repositioned upright for PO trials. Full range of consistencies presented except mech soft. Pt demo with overall timely swallow. Inconsistent multiple swallow x1 with all trials. Consistent cough noted immediately following swallow initiation with both nectar thick trials and thin liquid trials. Adequate mastication and oral clearing of regular solids. ST recommends for pt to complete more objective testing with MBS this date to r/o aspiration or need for alternate diet. MBS scheduled for this date. Okay to continue on soft to chew diet and will change to honey thick liquids til study is completed. Meds whole in applesauce. General aspiration precautions. ST to follow.  -BN          Pharyngeal Phase Concerns    Pharyngeal Phase Concerns -- cough  -BN       Cough -- thin;nectar  -BN          MBS/VFSS    Utensils Used spoon;straw   -MG --       Consistencies Trialed honey-thick liquids;nectar/syrup-thick liquids;thin liquids;pureed;soft to chew textures;regular textures  -MG --          MBS/VFSS Interpretation    Oral Prep Phase impaired oral phase of swallowing  -MG --       Oral Transit Phase impaired  -MG --       Oral Residue impaired  -MG --       VFSS Summary See note  -MG --          Initiation of Pharyngeal Swallow    Initiation of Pharyngeal Swallow WFL  -MG --       Pharyngeal Phase impaired pharyngeal phase of swallowing  -MG --       Penetration During the Swallow nectar-thick liquids  -MG --       Penetration After the Swallow thin liquids;secondary to residue;in pyriform sinuses  -MG --       Aspiration After the Swallow thin liquids;secondary to residue;in pyriform sinuses  -MG --       Response to Penetration No  -MG --       No spontaneous response to penetration and non-effective laryngeal clearance with cue (see comments)  -MG --       Response to Aspiration Yes  -MG --       Responded to aspiration with cough;inconsistent  -MG --       Pharyngeal Residue base of tongue;valleculae;pyriform sinuses;posterior pharyngeal wall  -MG --       Response to Residue partial residue clearance  -MG --       Attempted Compensatory Maneuvers multiple swallows;chin tuck  -MG --       Response to Attempted Compensatory Maneuvers did not reduce residue  -MG --          Esophageal Phase    Esophageal Phase see radiology report for further details  -MG --          Swallowing Quality of Life Assessment    Psychosocial Eating History food important to aspect of social life  -MG --       Patient/family preferences --  avoid NPO status  -MG --       Education and counseling provided Signs of aspiration;Risks of aspiration;Safest diet options;Alternate nutrition/hydration options, risks, and benefits;Oral care recommendations and rationale;Aspiration precautions;Compensatory strategy recommendations and rationale  -MG --          SLP Evaluation  Clinical Impression    SLP Swallowing Diagnosis suspect chronic;mod-severe;oral dysphagia;pharyngeal dysphagia  with likely esophageal component  -MG R/O pharyngeal dysphagia;suspected pharyngeal dysphagia  -BN       Functional Impact risk of aspiration/pneumonia;risk of malnutrition;risk of dehydration  -MG risk of aspiration/pneumonia  -BN       Rehab Potential/Prognosis, Swallowing adequate, monitor progress closely  -MG good, to achieve stated therapy goals  -BN       Swallow Criteria for Skilled Therapeutic Interventions Met no problems identified which require skilled intervention  -MG demonstrates skilled criteria  -BN          SLP Treatment Clinical Impressions    Care Plan Review -- evaluation/treatment results reviewed;care plan/treatment goals reviewed;risks/benefits reviewed;current/potential barriers reviewed;patient/other agree to care plan  -BN       Care Plan Review, Other Participant(s) -- caregiver  -BN          Recommendations    Therapy Frequency (Swallow) at least;2 days per week  -MG at least;2 days per week  -BN       Predicted Duration Therapy Intervention (Days) until discharge  -MG until discharge  -BN       SLP Diet Recommendation soft to chew textures;chopped;nectar thick liquids;ice chips between meals after oral care, with supervision  vs discussion about NPO as safest and PEG tube  -MG soft to chew textures;honey thick liquids  -BN       Recommended Diagnostics -- VFSS (MBS)  -BN       Recommended Precautions and Strategies upright posture during/after eating;small bites of food and sips of liquid;multiple swallows per bite of food;multiple swallows per sip of liquid;general aspiration precautions;volitional throat clear;fatigue precautions;reflux precautions  -MG upright posture during/after eating;small bites of food and sips of liquid;general aspiration precautions  -BN       Oral Care Recommendations Oral Care before breakfast, after meals and PRN;Toothbrush  -MG Oral Care BID/PRN   -BN       SLP Rec. for Method of Medication Administration meds whole;meds crushed;with puree  -MG meds whole;with puree  -BN       Monitor for Signs of Aspiration yes;notify SLP if any concerns  -MG yes;notify SLP if any concerns  -BN       Anticipated Discharge Disposition (SLP) home with home health  -MG unknown  -BN          Swallow Goals (SLP)    Swallow LTGs Swallow Long Term Goal (free text)  -MG Swallow Long Term Goal (free text)  -BN       Swallow STGs diet tolerance goal selection (SLP);swallow management recall goal selection (SLP)  -MG diet tolerance goal selection (SLP)  -BN       Diet Tolerance Goal Selection (SLP) Patient will tolerate trials of  -MG Patient will tolerate trials of  -BN       Swallow Management Recall Goal Selection (SLP) swallow management recall, SLP goal 1  -MG --          (LTG) Swallow    (LTG) Swallow Pt will tolerate LRD without s/s of aspiration.  -MG Pt will tolerate LRD without s/s of aspiration.  -BN       Assumption (Swallow Long Term Goal) independently (over 90% accuracy)  -MG independently (over 90% accuracy)  -BN       Time Frame (Swallow Long Term Goal) by discharge  -MG by discharge  -BN       Barriers (Swallow Long Term Goal) n/a  -MG n/a  -BN       Progress/Outcomes (Swallow Long Term Goal) new goal  -MG new goal  -BN          (STG) Patient will tolerate trials of    Consistencies Trialed (Tolerate trials) regular textures;soft to chew (ground) textures;thin liquids;nectar/ mildly thick liquids  -MG regular textures;soft to chew (ground) textures;thin liquids;nectar/ mildly thick liquids;honey/ moderately thick liquids  -BN       Desired Outcome (Tolerate trials) without signs/symptoms of aspiration  -MG without signs/symptoms of aspiration  -BN       Assumption (Tolerate trials) independently (over 90% accuracy)  -MG independently (over 90% accuracy)  -BN       Time Frame (Tolerate trials) by discharge  -MG by discharge  -BN       Progress/Outcomes (Tolerate  trials) new goal  -MG new goal  -BN          (STG) Swallow Management Recall Goal 1 (SLP)    Activity (Swallow Management Recall Goal 1, SLP) independent recall of;aspiration precautions;reflux precautions;fatigue precautions;oral care recommendations;compensatory swallow strategies/techniques;postural techniques;rationale for use of strategies/techniques  extra swallows and volitional throat clear  -MG --       [place]/Accuracy (Swallow Management Recall Goal 1, SLP) independently (over 90% accuracy)  -MG --       Time Frame (Swallow Management Recall Goal 1, SLP) by discharge  -MG --       Barriers (Swallow Management Recall Goal 1, SLP) none  -MG --       Progress/Outcomes (Swallow Management Recall Goal 1, SLP) new goal  -MG --                 User Key  (r) = Recorded By, (t) = Taken By, (c) = Cosigned By      Initials Name Effective Dates    Galilea Garcia, MS-CCC/SLP, [facility] 07/11/23 -     MG Ruby Cruz, MS CCC-SLP 07/11/23 -                     EDUCATION  The patient has been educated in the following areas:   Dysphagia (Swallowing Impairment) Oral Care/Hydration Modified Diet Instruction.        SLP GOALS       Row Name 12/13/24 1010 12/13/24 0712          (LTG) Swallow    (LTG) Swallow Pt will tolerate LRD without s/s of aspiration.  -MG Pt will tolerate LRD without s/s of aspiration.  -BN     [place] (Swallow Long Term Goal) independently (over 90% accuracy)  -MG independently (over 90% accuracy)  -BN     Time Frame (Swallow Long Term Goal) by discharge  -MG by discharge  -BN     Barriers (Swallow Long Term Goal) n/a  -MG n/a  -BN     Progress/Outcomes (Swallow Long Term Goal) new goal  -MG new goal  -BN        (STG) Patient will tolerate trials of    Consistencies Trialed (Tolerate trials) regular textures;soft to chew (ground) textures;thin liquids;nectar/ mildly thick liquids  -MG regular textures;soft to chew (ground) textures;thin liquids;nectar/ mildly thick liquids;honey/  moderately thick liquids  -BN     Desired Outcome (Tolerate trials) without signs/symptoms of aspiration  -MG without signs/symptoms of aspiration  -BN     [place] (Tolerate trials) independently (over 90% accuracy)  -MG independently (over 90% accuracy)  -BN     Time Frame (Tolerate trials) by discharge  -MG by discharge  -BN     Progress/Outcomes (Tolerate trials) new goal  -MG new goal  -BN        (STG) Swallow Management Recall Goal 1 (SLP)    Activity (Swallow Management Recall Goal 1, SLP) independent recall of;aspiration precautions;reflux precautions;fatigue precautions;oral care recommendations;compensatory swallow strategies/techniques;postural techniques;rationale for use of strategies/techniques  extra swallows and volitional throat clear  -MG --     [place]/Accuracy (Swallow Management Recall Goal 1, SLP) independently (over 90% accuracy)  -MG --     Time Frame (Swallow Management Recall Goal 1, SLP) by discharge  -MG --     Barriers (Swallow Management Recall Goal 1, SLP) none  -MG --     Progress/Outcomes (Swallow Management Recall Goal 1, SLP) new goal  -MG --               User Key  (r) = Recorded By, (t) = Taken By, (c) = Cosigned By      Initials Name Provider Type    Galilea Garcia MS-CCC/SLP, [facility] Speech and Language Pathologist    MG Ruby Cruz MS CCC-SLP Speech and Language Pathologist                         Time Calculation:    Time Calculation- SLP       Row Name 12/13/24 1258 12/13/24 1108          Time Calculation- SLP    SLP Start Time 0916  -MG 0712  -BN     SLP Stop Time 1136  -MG 0806  -BN     SLP Time Calculation (min) 140 min  -MG 54 min  -BN     SLP Received On 12/13/24  -MG 12/13/24  -BN     SLP Goal Re-Cert Due Date -- 12/23/24  -BN        Timed Charges    20342-QZ Selfcare Mgmt Minutes 13  -MG --        Untimed Charges    SLP Eval/Re-eval  ST Motion Fluoro Eval Swallow - 94023  -MG --     78439-CH Eval Oral Pharyng Swallow Minutes -- 54  -BN      26795-BD Motion Fluoro Eval Swallow Minutes 127  -MG --        Total Minutes    Timed Charges Total Minutes 13  -MG --     Untimed Charges Total Minutes 127  -MG 54  -BN      Total Minutes 140  -MG 54  -BN               User Key  (r) = Recorded By, (t) = Taken By, (c) = Cosigned By      Initials Name Provider Type    Galilea Garcia, MS-CCC/SLP, [facility] Speech and Language Pathologist    MG Ruby Cruz, MS CCC-SLP Speech and Language Pathologist                    Therapy Charges for Today       Code Description Service Date Service Provider Modifiers Qty    14481068867 HC ST MOTION FLUORO EVAL SWALLOW 8 12/13/2024 Ruby Cruz, MS CCC-SLP GN 1    65695993483 HC ST SELF CARE/MGMT/TRAIN EA 15 MIN 12/13/2024 Ruby Cruz, MS CCC-SLP GN 1                 Ruby Cruz MS CCC-SLP  12/13/2024

## 2024-12-13 NOTE — THERAPY EVALUATION
Acute Care - Speech Language Pathology   Swallow Initial Evaluation [facility]     Patient Name: Richard NOLAN Jr.  : 1950  MRN: [id number]  Today's Date: 2024               Admit Date: 2024   clinical bedside swallow evaluation completed. Pt admitted for acute pneumonia. Hx of muscular dystrophy, HTN, [place], DM2, GERD, prostate and skin CA. Per H&P, pt had instance of suspected aspiration. Pt reports aspiration did not occur during PO but while sleeping in reclinder secondary to reflux.     Pt oral mech exam does reveal left sided labial droop. RN notified. Pt stated he felt he has had a droop for approximately a few months. Upper and lower dentures placed for PO trials as well as hearing aids bilaterally. Congested cough noted prior to any PO. Pt does have hx of dysphagia with prior MBS completed on 23 and outpt speech therapy services. Pt reports not currently recieving any speech services and no altered diet at this time.     Pt repositioned upright for PO trials. Full range of consistencies presented except mech soft. Pt demo with overall timely swallow. Inconsistent multiple swallow x1 with all trials. Consistent cough noted immediately following swallow initiation with both nectar thick trials and thin liquid trials. Adequate mastication and oral clearing of regular solids.     ST recommends for pt to complete more objective testing with MBS this date to r/o aspiration or need for alternate diet. MBS scheduled for this date. Okay to continue on soft to chew diet and will change to honey thick liquids til study is completed. Meds whole in applesauce. General aspiration precautions. ST to follow.  Galilea Srivastava M.S. CCC-SLP, [facility]  2024  11:11 CST      Visit Dx:       ICD-10-CM ICD-9-CM   1. Pneumonia due to infectious organism, unspecified laterality, unspecified part of lung  J18.9 486   2. Hypoxia  R09.02 799.02   3. Dysphagia, unspecified type  R13.10 787.20     Patient Active  Problem List   Diagnosis    Prostate cancer    Non-smoker    Urinary frequency    Aspirin long-term use    History of radiation therapy    Pharyngoesophageal dysphagia    Hx of colonic polyp    Family hx of colon cancer    Pneumonia     Past Medical History:   Diagnosis Date    Cancer     PROSTATE CANCER     Colon polyps     Cystitis     Depression     Diabetes mellitus     Enlarged prostate     Esophagitis     GERD (gastroesophageal reflux disease)     [place] (hard of hearing)     Hypertension     Kidney stone     OPCD (olivopontocerebellar degeneration)     PONV (postoperative nausea and vomiting)     Skin cancer     Varicose vein of leg      Past Surgical History:   Procedure Laterality Date    COLONOSCOPY N/A 9/19/2022    Procedure: COLONOSCOPY WITH ANESTHESIA;  Surgeon: Adair Beltran MD;  Location: [facility] ENDOSCOPY;  Service: Gastroenterology;  Laterality: N/A;  preop; hx of polyps  postop; polyps   PCP Marcelino Weaver     ENDOSCOPY N/A 9/19/2022    Procedure: ESOPHAGOGASTRODUODENOSCOPY WITH ANESTHESIA;  Surgeon: Adair Beltran MD;  Location: [facility] ENDOSCOPY;  Service: Gastroenterology;  Laterality: N/A;  preop; dysphagia  postop; normal   PCP Marcelino Weaver     ENDOSCOPY AND COLONOSCOPY  11/13/2015    post gastric bypass, dilation, very small internal hemorroid    EYE SURGERY Bilateral     Cataract    GASTRIC BYPASS      NASAL SEPTUM SURGERY      NECK SURGERY      ORIF TIBIA/FIBULA FRACTURES Right     PROSTATE BIOPSY N/A 05/13/2021    Procedure: PROSTATE ULTRASOUND BIOPSY MRI FUSION WITH URONAV;  Surgeon: Gregorio Lange MD;  Location: [facility] OR;  Service: Urology;  Laterality: N/A;    VEIN LIGATION AND STRIPPING         SLP Recommendation and Plan  SLP Swallowing Diagnosis: R/O pharyngeal dysphagia, suspected pharyngeal dysphagia (12/13/24 0712)  SLP Diet Recommendation: soft to chew textures, honey thick liquids (12/13/24 0712)  Recommended Precautions and Strategies: upright posture  during/after eating, small bites of food and sips of liquid, general aspiration precautions (12/13/24 0712)  SLP Rec. for Method of Medication Administration: meds whole, with puree (12/13/24 0712)     Monitor for Signs of Aspiration: yes, notify SLP if any concerns (12/13/24 0712)  Recommended Diagnostics: VFSS (MBS) (12/13/24 0712)  Swallow Criteria for Skilled Therapeutic Interventions Met: demonstrates skilled criteria (12/13/24 0712)  Anticipated Discharge Disposition (SLP): unknown (12/13/24 0712)  Rehab Potential/Prognosis, Swallowing: good, to achieve stated therapy goals (12/13/24 0712)  Therapy Frequency (Swallow): at least, 2 days per week (12/13/24 0712)  Predicted Duration Therapy Intervention (Days): until discharge (12/13/24 0712)  Oral Care Recommendations: Oral Care BID/PRN (12/13/24 0712)                                        Progress: no change (eval)  Outcome Evaluation:  clinical bedside swallow evaluation completed. Pt admitted for acute pneumonia. Hx of muscular dystrophy, HTN, [place], DM2, GERD, prostate and skin CA. Per H&P, pt had instance of suspected aspiration. Pt reports aspiration did not occur during PO but while sleeping in reclinder secondary to reflux. Pt oral mech exam does reveal left sided labial droop. RN notified. Upper and lower dentures placed for PO trials as well as hearing aids bilaterally. Congested cough noted prior to any PO. Pt does have hx of dysphagia with prior MBS completed on 11/28/23 and outpt speech therapy services. Pt reports not currently recieving any speech services and no altered diet at this time. Pt repositioned upright for PO trials. Full range of consistencies presented except mech soft. Pt demo with overall timely swallow. Inconsistent multiple swallow x1 with all trials. Consistent cough noted immediately following swallow initiation with both nectar thick trials and thin liquid trials. Adequate mastication and oral clearing of regular solids. ST  recommends for pt to complete more objective testing with MBS this date to r/o aspiration or need for alternate diet. MBS scheduled for this date. Okay to continue on soft to chew diet and will change to honey thick liquids til study is completed. Meds whole in applesauce. General aspiration precautions. ST to follow.      SWALLOW EVALUATION (Last 72 Hours)       SLP Adult Swallow Evaluation       Row Name 12/13/24 1010 12/13/24 0712                Rehab Evaluation    Document Type re-evaluation  -MG evaluation  -BN       Subjective Information no complaints  -MG no complaints  -BN       Patient Observations alert;cooperative;agree to therapy  -MG alert;cooperative  -BN       Patient/Family/Caregiver Comments/Observations -- no family present  -BN       Patient Effort good  -MG good  -BN       Symptoms Noted, Comment n  -MG --          General Information    Patient Profile Reviewed yes  -MG yes  -BN       Pertinent History Of Current Problem Hypoxia and concern for aspiration due to reflux. History of muscular dystrophy, DM , HTN, GERD, prior endoscopy with esophageal dilations x2. Prior swallow study on 11/28/2023 with residue as main concern but remained on regular diet with thin liquids.  -MG acute pneumonia. Hx of muscular dystrophy, HTN, [place], DM2, GERD, prostate and skin CA.  -BN       Current Method of Nutrition thin liquids;soft to chew textures  -MG thin liquids;soft to chew textures  -BN       Precautions/Limitations, Vision WFL with corrective lenses  -MG WFL with corrective lenses  -BN       Precautions/Limitations, Hearing hearing aid, right;hearing aid, left  -MG hearing aid, right;hearing aid, left  -BN       Prior Level of Function-Communication WFL  -MG WFL  -BN       Prior Level of Function-Swallowing no diet consistency restrictions  -MG no diet consistency restrictions  -BN       Plans/Goals Discussed with patient  -MG patient  -BN       Barriers to Rehab medically complex  -MG none identified   -BN       Patient's Goals for Discharge --  reports he would consider a feeding tube if needed in the future  -MG patient did not state  -BN          Pain    Pretreatment Pain Rating -- 0/10 - no pain  -BN       Posttreatment Pain Rating -- 0/10 - no pain  -BN       Additional Documentation Pain Scale: FACES Pre/Post-Treatment (Group)  -MG --          Pain Scale: FACES Pre/Post-Treatment    Pain: FACES Scale, Pretreatment 0-->no hurt  -MG --       Posttreatment Pain Rating 0-->no hurt  -MG --          Oral Motor Structure and Function    Dentition Assessment lower dentures/partial in place;upper dentures/partial in place  -MG lower dentures/partial in place;upper dentures/partial in place  -BN       Secretion Management WNL/WFL  -MG WNL/WFL  -BN       Mucosal Quality moist, healthy  -MG moist, healthy  -BN       Volitional Swallow weak  -MG --       Volitional Cough weak  -MG --          Oral Musculature and Cranial Nerve Assessment    Oral Motor General Assessment oral labial or buccal impairment  -MG oral labial or buccal impairment  -BN       Oral Labial or Buccal Impairment, Detail, Cranial Nerve VII (Facial): left labial droop  -MG left labial droop  -BN          General Eating/Swallowing Observations    Respiratory Support Currently in Use nasal cannula  -MG nasal cannula  -BN       Eating/Swallowing Skills -- fed by SLP  -BN       Positioning During Eating -- upright in bed  -BN       Utensils Used -- spoon;straw  -BN       Consistencies Trialed -- regular textures;pudding thick;honey-thick liquids;nectar/syrup-thick liquids;thin liquids  -BN          Clinical Swallow Eval    Oral Prep Phase -- WFL  -BN       Oral Transit -- WFL  -BN       Oral Residue -- WFL  -BN       Pharyngeal Phase -- suspected pharyngeal impairment  -BN       Esophageal Phase -- unremarkable  -BN       Clinical Swallow Evaluation Summary -- ST clinical bedside swallow evaluation completed. Pt admitted for acute pneumonia. Hx of muscular  dystrophy, HTN, [place], DM2, GERD, prostate and skin CA. Per H&P, pt had instance of suspected aspiration. Pt reports aspiration did not occur during PO but while sleeping in reclinder secondary to reflux. Pt oral mech exam does reveal left sided labial droop. RN notified. Upper and lower dentures placed for PO trials as well as hearing aids bilaterally. Congested cough noted prior to any PO. Pt does have hx of dysphagia with prior MBS completed on 11/28/23 and outpt speech therapy services. Pt reports not currently recieving any speech services and no altered diet at this time. Pt repositioned upright for PO trials. Full range of consistencies presented except mech soft. Pt demo with overall timely swallow. Inconsistent multiple swallow x1 with all trials. Consistent cough noted immediately following swallow initiation with both nectar thick trials and thin liquid trials. Adequate mastication and oral clearing of regular solids. ST recommends for pt to complete more objective testing with MBS this date to r/o aspiration or need for alternate diet. MBS scheduled for this date. Okay to continue on soft to chew diet and will change to honey thick liquids til study is completed. Meds whole in applesauce. General aspiration precautions. ST to follow.  -BN          Pharyngeal Phase Concerns    Pharyngeal Phase Concerns -- cough  -BN       Cough -- thin;nectar  -BN          SLP Evaluation Clinical Impression    SLP Swallowing Diagnosis -- R/O pharyngeal dysphagia;suspected pharyngeal dysphagia  -BN       Functional Impact -- risk of aspiration/pneumonia  -BN       Rehab Potential/Prognosis, Swallowing -- good, to achieve stated therapy goals  -BN       Swallow Criteria for Skilled Therapeutic Interventions Met -- demonstrates skilled criteria  -BN          SLP Treatment Clinical Impressions    Care Plan Review -- evaluation/treatment results reviewed;care plan/treatment goals reviewed;risks/benefits  reviewed;current/potential barriers reviewed;patient/other agree to care plan  -BN       Care Plan Review, Other Participant(s) -- caregiver  -BN          Recommendations    Therapy Frequency (Swallow) -- at least;2 days per week  -BN       Predicted Duration Therapy Intervention (Days) -- until discharge  -BN       SLP Diet Recommendation -- soft to chew textures;honey thick liquids  -BN       Recommended Diagnostics -- VFSS (MBS)  -BN       Recommended Precautions and Strategies -- upright posture during/after eating;small bites of food and sips of liquid;general aspiration precautions  -BN       Oral Care Recommendations -- Oral Care BID/PRN  -BN       SLP Rec. for Method of Medication Administration -- meds whole;with puree  -BN       Monitor for Signs of Aspiration -- yes;notify SLP if any concerns  -BN       Anticipated Discharge Disposition (SLP) -- unknown  -BN          Swallow Goals (SLP)    Swallow LTGs -- Swallow Long Term Goal (free text)  -BN       Swallow STGs -- diet tolerance goal selection (SLP)  -BN       Diet Tolerance Goal Selection (SLP) -- Patient will tolerate trials of  -BN          (LTG) Swallow    (LTG) Swallow -- Pt will tolerate LRD without s/s of aspiration.  -BN       [place] (Swallow Long Term Goal) -- independently (over 90% accuracy)  -BN       Time Frame (Swallow Long Term Goal) -- by discharge  -BN       Barriers (Swallow Long Term Goal) -- n/a  -BN       Progress/Outcomes (Swallow Long Term Goal) -- new goal  -BN          (STG) Patient will tolerate trials of    Consistencies Trialed (Tolerate trials) -- regular textures;soft to chew (ground) textures;thin liquids;nectar/ mildly thick liquids;honey/ moderately thick liquids  -BN       Desired Outcome (Tolerate trials) -- without signs/symptoms of aspiration  -BN       [place] (Tolerate trials) -- independently (over 90% accuracy)  -BN       Time Frame (Tolerate trials) -- by discharge  -BN       Progress/Outcomes  (Tolerate trials) -- new goal  -BN                 User Key  (r) = Recorded By, (t) = Taken By, (c) = Cosigned By      Initials Name Effective Dates    Galilea Garcia MS-CCC/SLP, [facility] 07/11/23 -     MG Ruby Cruz MS CCC-SLP 07/11/23 -                     EDUCATION  The patient has been educated in the following areas:   Dysphagia (Swallowing Impairment) Modified Diet Instruction.        SLP GOALS       Row Name 12/13/24 0712             (LTG) Swallow    (LTG) Swallow Pt will tolerate LRD without s/s of aspiration.  -BN      [place] (Swallow Long Term Goal) independently (over 90% accuracy)  -BN      Time Frame (Swallow Long Term Goal) by discharge  -BN      Barriers (Swallow Long Term Goal) n/a  -BN      Progress/Outcomes (Swallow Long Term Goal) new goal  -BN         (STG) Patient will tolerate trials of    Consistencies Trialed (Tolerate trials) regular textures;soft to chew (ground) textures;thin liquids;nectar/ mildly thick liquids;honey/ moderately thick liquids  -BN      Desired Outcome (Tolerate trials) without signs/symptoms of aspiration  -BN      [place] (Tolerate trials) independently (over 90% accuracy)  -BN      Time Frame (Tolerate trials) by discharge  -BN      Progress/Outcomes (Tolerate trials) new goal  -BN                User Key  (r) = Recorded By, (t) = Taken By, (c) = Cosigned By      Initials Name Provider Type    Galilea Garcia MS-CCC/SLP, [facility] Speech and Language Pathologist                         Time Calculation:    Time Calculation- SLP       Row Name 12/13/24 1108             Time Calculation- SLP    SLP Start Time 0712  -BN      SLP Stop Time 0806  -BN      SLP Time Calculation (min) 54 min  -BN      SLP Received On 12/13/24  -BN      SLP Goal Re-Cert Due Date 12/23/24  -BN         Untimed Charges    56298-JL Eval Oral Pharyng Swallow Minutes 54  -BN         Total Minutes    Untimed Charges Total Minutes 54  -BN       Total Minutes 54  -BN                 User Key  (r) = Recorded By, (t) = Taken By, (c) = Cosigned By      Initials Name Provider Type    Galilea Garcia, MS-CCC/SLP, [facility] Speech and Language Pathologist                    Therapy Charges for Today       Code Description Service Date Service Provider Modifiers Qty    [id number] HC ST EVAL ORAL PHARYNG SWALLOW 4 12/13/2024 Galilea Srivastava MS-CCC/SLP, [facility] GN 1                 Galilea Harrod, MS-CCC/SLP, [facility]  12/13/2024

## 2024-12-13 NOTE — PLAN OF CARE
Goal Outcome Evaluation:  Plan of Care Reviewed With: patient        Progress: improving       Anticipated Discharge Disposition (SLP): home with home health          SLP Swallowing Diagnosis: suspect chronic, mod-severe, oral dysphagia, pharyngeal dysphagia (with likely esophageal component) (12/13/24 1010)             Swallow study completed. See note.     Ruby Cruz MS CCC-SLP 12/13/2024 11:20 CST

## 2024-12-13 NOTE — PLAN OF CARE
Goal Outcome Evaluation:  Plan of Care Reviewed With: patient, caregiver        Progress: no change (eval)  Outcome Evaluation: ST clinical bedside swallow evaluation completed. Pt admitted for acute pneumonia. Hx of muscular dystrophy, HTN, [place], DM2, GERD, prostate and skin CA. Per H&P, pt had instance of suspected aspiration. Pt reports aspiration did not occur during PO but while sleeping in reclinder secondary to reflux.     Pt oral mech exam does reveal left sided labial droop. RN notified. Upper and lower dentures placed for PO trials as well as hearing aids bilaterally. Congested cough noted prior to any PO. Pt does have hx of dysphagia with prior MBS completed on 11/28/23 and outpt speech therapy services. Pt reports not currently recieving any speech services and no altered diet at this time.     Pt repositioned upright for PO trials. Full range of consistencies presented except mech soft. Pt demo with overall timely swallow. Inconsistent multiple swallow x1 with all trials. Consistent cough noted immediately following swallow initiation with both nectar thick trials and thin liquid trials. Adequate mastication and oral clearing of regular solids.     ST recommends for pt to complete more objective testing with MBS this date to r/o aspiration or need for alternate diet. MBS scheduled for this date. Okay to continue on soft to chew diet and will change to honey thick liquids til study is completed. Meds whole in applesauce. General aspiration precautions. ST to follow.    Anticipated Discharge Disposition (SLP): unknown          SLP Swallowing Diagnosis: R/O pharyngeal dysphagia, suspected pharyngeal dysphagia (12/13/24 0712)

## 2024-12-13 NOTE — H&P
History and Physical      CHIEF COMPLAINT:  Cough    Reason for Admission:  Hypoxia    History Obtained From: Patient    HISTORY OF PRESENT ILLNESS:      The patient is a 74 y.o. male who presented with aspiration event of Monday resulting in subsequent progressively worsening cough and shortness of air until he presented to the ED with signs and symptoms of aspiration pneumonia.  He was also found to be hypoxic and subsequently admitted for further workup.    Past Medical History:    Past Medical History:   Diagnosis Date    Cancer     PROSTATE CANCER     Colon polyps     Cystitis     Depression     Diabetes mellitus     Enlarged prostate     Esophagitis     GERD (gastroesophageal reflux disease)     [place] (hard of hearing)     Hypertension     Kidney stone     OPCD (olivopontocerebellar degeneration)     PONV (postoperative nausea and vomiting)     Skin cancer     Varicose vein of leg        Past Surgical History:    Past Surgical History:   Procedure Laterality Date    COLONOSCOPY N/A 9/19/2022    Procedure: COLONOSCOPY WITH ANESTHESIA;  Surgeon: Adair Beltran MD;  Location: [facility] ENDOSCOPY;  Service: Gastroenterology;  Laterality: N/A;  preop; hx of polyps  postop; polyps   PCP Marcelino Weaver     ENDOSCOPY N/A 9/19/2022    Procedure: ESOPHAGOGASTRODUODENOSCOPY WITH ANESTHESIA;  Surgeon: Adair Beltran MD;  Location: [facility] ENDOSCOPY;  Service: Gastroenterology;  Laterality: N/A;  preop; dysphagia  postop; normal   PCP Marcelino Weaver     ENDOSCOPY AND COLONOSCOPY  11/13/2015    post gastric bypass, dilation, very small internal hemorroid    EYE SURGERY Bilateral     Cataract    GASTRIC BYPASS      NASAL SEPTUM SURGERY      NECK SURGERY      ORIF TIBIA/FIBULA FRACTURES Right     PROSTATE BIOPSY N/A 05/13/2021    Procedure: PROSTATE ULTRASOUND BIOPSY MRI FUSION WITH URONAV;  Surgeon: Gregorio Lange MD;  Location: [facility] OR;  Service: Urology;  Laterality: N/A;    VEIN LIGATION AND STRIPPING          Medications Prior to Admission:    Medications Prior to Admission   Medication Sig Dispense Refill Last Dose/Taking    aspirin 81 MG EC tablet Take 1 tablet by mouth Daily.   Taking    diclofenac (VOLTAREN) 75 MG EC tablet Take 1 tablet by mouth 2 (Two) Times a Day.   Taking    empagliflozin (JARDIANCE) 10 MG tablet tablet Take 1 tablet by mouth Daily.   Taking    famotidine (Pepcid) 20 MG tablet Take 1 tablet by mouth 2 (Two) Times a Day.   Taking    FLUoxetine (PROzac) 20 MG capsule Take 1 capsule by mouth Daily.   Taking    insulin glargine (LANTUS) 100 UNIT/ML injection Inject 15 Units under the skin into the appropriate area as directed 2 (Two) Times a Day. (Patient taking differently: Inject 20 Units under the skin into the appropriate area as directed Every Morning.)   Patient Taking Differently    insulin glargine (LANTUS, SEMGLEE) 100 UNIT/ML injection Inject 15 Units under the skin into the appropriate area as directed 2 (Two) Times a Day. (Patient taking differently: Inject 10 Units under the skin into the appropriate area as directed Every Night.)   Patient Taking Differently    insulin lispro (humaLOG) 100 UNIT/ML injection Inject  under the skin into the appropriate area as directed 3 (Three) Times a Day Before Meals. Sliding scale.  6-10 units depending on intake (Patient taking differently: Inject 6-8 Units under the skin into the appropriate area as directed Every Morning.)   Patient Taking Differently    Janumet  MG per tablet Take 1 tablet by mouth 2 (Two) Times a Day.   Taking    lisinopril (PRINIVIL,ZESTRIL) 10 MG tablet Take 1 tablet by mouth Daily.   Taking    omeprazole (priLOSEC) 40 MG capsule Take 1 capsule by mouth Daily.   Taking    diphenhydrAMINE-acetaminophen (TYLENOL PM)  MG tablet per tablet Take 1 tablet by mouth At Night As Needed for Sleep.   Unknown       Allergies:  Patient has no known allergies.    Social History:   Social History     Socioeconomic History     Marital status:    Tobacco Use    Smoking status: Never    Smokeless tobacco: Never   Vaping Use    Vaping status: Never Used   Substance and Sexual Activity    Alcohol use: Yes     Comment: occ    Drug use: Never    Sexual activity: Defer       Family History:   Family History   Problem Relation Age of Onset    Colon polyps Mother     Colon polyps Brother     Colon cancer Neg Hx        REVIEW OF SYSTEMS:    CONSTITUTIONAL:  Negative for anorexia, chills, fevers, night sweats and weight loss.  Patient has been relatively medically stable until illness outlined in HPI  EYES:  Negative for eye dryness, icterus and redness, no significant changes in vision  HEENT:  Negative for dental problems, epistaxis or sinus congestion.  No history of facial trauma or difficulty swallowing  RESPIRATORY:  Negative for chest tightness, cough, dyspnea on exertion, pneumonia or worse sputum production  CARDIOVASCULAR: No history of chest pain, dyspnea, exertional chest pressure/discomfort, irregular heartbeat, or PND  GASTROINTESTINAL:  Negative for abdominal pain, nausea or vomiting.  No history of hematemesis, jaundice, melena or rectal bleeding  MUSCULOSKELETAL:  Negative for muscle weakness, myalgias and neck pain.  No significant arthralgias  NEUROLOGICAL:   Negative for dizziness, headaches, seizures, speech problems, tremors and vertigo.  Mentation has been at baseline  INTEGUMENT: Negative for pruritus, rash, skin color change and skin lesion(s)       Vital Signs   Temp:  [97.7 °F (36.5 °C)-97.9 °F (36.6 °C)] 97.7 °F (36.5 °C)  Heart Rate:  [80-96] 88  Resp:  [18] 18  BP: (133-192)/(71-99) 133/73          Physical Exam:  Constitutional: The patient is oriented to person, place, and time. They appear well-developed.  Able to answer questions appropriately  HEENT: Grossly normal, sitting up in bed  Head: Normocephalic and atraumatic.   Eyes: Pupils are equal, round, and reactive to light.  Extraocular muscles appear to  be intact  Neck: Neck supple without masses or carotid bruit.  Cardiovascular: Regular rhythm and normal heart sounds.  No significant lift, rub or murmur appreciated  Pulmonary/Chest: Effort normal and breath sounds normal. CTAB, no appreciable rales or wheezes  Abdominal: Soft. Bowel sounds are normal. There is  no distension or tenderness appreciated. There is no rebound and no guarding.   Musculoskeletal: Normal range of motion. There is  no edema or tenderness.  No significant joint swelling  Neurological: Patient is alert and oriented to person, place, and time. They have normal reflexes. CN 2-12 appear grossly intact  Skin: Skin is warm and dry without significant rashes     Results Review:   I reviewed the patient's new imaging results and agree with the interpretation.    DATA:  Lab Results (last 24 hours)       Procedure Component Value Units Date/Time    POC Glucose Once [[id number]]  (Abnormal) Collected: 12/13/24 1125    Specimen: Blood Updated: 12/13/24 1150     Glucose 229 mg/dL      Comment: : [id number] [place] ID: [id number]       POC Glucose Once [[id number]]  (Normal) Collected: 12/13/24 0735    Specimen: Blood Updated: 12/13/24 0747     Glucose 87 mg/dL      Comment: : [id number] [place] OliviaMeter ID: [id number]       Comprehensive Metabolic Panel [[id number]]  (Abnormal) Collected: 12/13/24 0439    Specimen: Blood Updated: 12/13/24 0512     Glucose 97 mg/dL      BUN 15 mg/dL      Creatinine 0.37 mg/dL      Sodium 139 mmol/L      Potassium 4.0 mmol/L      Chloride 103 mmol/L      CO2 24.0 mmol/L      Calcium 9.1 mg/dL      Total Protein 7.1 g/dL      Albumin 3.7 g/dL      ALT (SGPT) 46 U/L      AST (SGOT) 83 U/L      Alkaline Phosphatase 84 U/L      Total Bilirubin 0.4 mg/dL      Globulin 3.4 gm/dL      A/G Ratio 1.1 g/dL      BUN/Creatinine Ratio 40.5     Anion Gap 12.0 mmol/L      eGFR 118.0 mL/min/1.73     Narrative:      GFR Categories in Chronic Kidney Disease  (CKD)      GFR Category          GFR (mL/min/1.73)    Interpretation  G1                     90 or greater         Normal or high (1)  G2                      60-89                Mild decrease (1)  G3a                   45-59                Mild to moderate decrease  G3b                   30-44                Moderate to severe decrease  G4                    15-29                Severe decrease  G5                    14 or less           Kidney failure          (1)In the absence of evidence of kidney disease, neither GFR category G1 or G2 fulfill the criteria for CKD.    eGFR calculation 2021 CKD-EPI creatinine equation, which does not include race as a factor    CBC (No Diff) [[id number]]  (Normal) Collected: 12/13/24 0439    Specimen: Blood Updated: 12/13/24 0451     WBC 8.52 10*3/mm3      RBC 4.85 10*6/mm3      Hemoglobin 13.6 g/dL      Hematocrit 42.7 %      MCV 88.0 fL      MCH 28.0 pg      MCHC 31.9 g/dL      RDW 14.3 %      RDW-SD 46.0 fl      MPV 9.2 fL      Platelets 298 10*3/mm3     POC Glucose Once [[id number]]  (Normal) Collected: 12/13/24 0125    Specimen: Blood Updated: 12/13/24 0136     Glucose 82 mg/dL      Comment: : [id number] [place] CourtneyMeter ID: [id number]       High Sensitivity Troponin T 1Hr [[id number]] Collected: 12/12/24 2035    Specimen: Blood Updated: 12/12/24 2132     HS Troponin T 21 ng/L      Troponin T Delta -7 ng/L      Troponin T % Change -25 %     Narrative:      High Sensitive Troponin T Reference Range:  <14.0 ng/L- Negative Female for AMI  <22.0 ng/L- Negative Male for AMI  >=14 - Abnormal Female indicating possible myocardial injury.  >=22 - Abnormal Male indicating possible myocardial injury.   Clinicians would have to utilize clinical acumen, EKG, Troponin, and serial changes to determine if it is an Acute Myocardial Infarction or myocardial injury due to an underlying chronic condition.         Respiratory Panel PCR w/COVID-19(SARS-CoV-2) GARRISON/ROGELIO/JONATHON/PAD/COR/ERNIE  In-House, NP Swab in [facility], 2 HR TAT - Swab, Nasopharynx [[id number]]  (Normal) Collected: 12/12/24 1927    Specimen: Swab from Nasopharynx Updated: 12/12/24 2038     ADENOVIRUS, PCR Not Detected     Coronavirus 229E Not Detected     Coronavirus HKU1 Not Detected     Coronavirus NL63 Not Detected     Coronavirus OC43 Not Detected     COVID19 Not Detected     Human Metapneumovirus Not Detected     Human Rhinovirus/Enterovirus Not Detected     Influenza A PCR Not Detected     Influenza B PCR Not Detected     Parainfluenza Virus 1 Not Detected     Parainfluenza Virus 2 Not Detected     Parainfluenza Virus 3 Not Detected     Parainfluenza Virus 4 Not Detected     RSV, PCR Not Detected     Bordetella pertussis pcr Not Detected     Bordetella parapertussis PCR Not Detected     Chlamydophila pneumoniae PCR Not Detected     Mycoplasma pneumo by PCR Not Detected    Narrative:      In the setting of a positive respiratory panel with a viral infection PLUS a negative procalcitonin without other underlying concern for bacterial infection, consider observing off antibiotics or discontinuation of antibiotics and continue supportive care. If the respiratory panel is positive for atypical bacterial infection (Bordetella pertussis, Chlamydophila pneumoniae, or Mycoplasma pneumoniae), consider antibiotic de-escalation to target atypical bacterial infection.    D-dimer, Quantitative [[id number]]  (Abnormal) Collected: 12/12/24 1926    Specimen: Blood Updated: 12/12/24 2018     D-Dimer, Quantitative 0.79 MCGFEU/mL     Narrative:      According to the assay 's published package insert, a normal (<0.50 MCGFEU/mL) D-dimer result in conjunction with a non-high clinical probability assessment, excludes deep vein thrombosis (DVT) and pulmonary embolism (PE) with high sensitivity.    D-dimer values increase with age and this can make VTE exclusion of an older population difficult. To address this, the American College of  "Physicians, based on best available evidence and recent guidelines, recommends that clinicians use age-adjusted D-dimer thresholds in patients greater than 50 years of age with: a) a low probability of PE who do not meet all Pulmonary Embolism Rule Out Criteria, or b) in those with intermediate probability of PE.   The formula for an age-adjusted D-dimer cut-off is \"age/100\".  For example, a 60 year old patient would have an age-adjusted cut-off of 0.60 MCGFEU/mL and an 80 year old 0.80 MCGFEU/mL.    Comprehensive Metabolic Panel [[id number]]  (Abnormal) Collected: 12/12/24 1926    Specimen: Blood Updated: 12/12/24 2008     Glucose 125 mg/dL      BUN 18 mg/dL      Creatinine 0.55 mg/dL      Sodium 139 mmol/L      Potassium 4.6 mmol/L      Chloride 101 mmol/L      CO2 24.0 mmol/L      Calcium 9.6 mg/dL      Total Protein 8.1 g/dL      Albumin 4.2 g/dL      ALT (SGPT) 58 U/L      AST (SGOT) 106 U/L      Alkaline Phosphatase 99 U/L      Total Bilirubin 0.4 mg/dL      Globulin 3.9 gm/dL      A/G Ratio 1.1 g/dL      BUN/Creatinine Ratio 32.7     Anion Gap 14.0 mmol/L      eGFR 104.6 mL/min/1.73     Narrative:      GFR Categories in Chronic Kidney Disease (CKD)      GFR Category          GFR (mL/min/1.73)    Interpretation  G1                     90 or greater         Normal or high (1)  G2                      60-89                Mild decrease (1)  G3a                   45-59                Mild to moderate decrease  G3b                   30-44                Moderate to severe decrease  G4                    15-29                Severe decrease  G5                    14 or less           Kidney failure          (1)In the absence of evidence of kidney disease, neither GFR category G1 or G2 fulfill the criteria for CKD.    eGFR calculation 2021 CKD-EPI creatinine equation, which does not include race as a factor    BNP [[id number]]  (Normal) Collected: 12/12/24 1926    Specimen: Blood Updated: 12/12/24 2005     proBNP " 325.8 pg/mL     Narrative:      This assay is used as an aid in the diagnosis of individuals suspected of having heart failure. It can be used as an aid in the diagnosis of acute decompensated heart failure (ADHF) in patients presenting with signs and symptoms of ADHF to the emergency department (ED). In addition, NT-proBNP of <300 pg/mL indicates ADHF is not likely.    Age Range Result Interpretation  NT-proBNP Concentration (pg/mL:      <50             Positive            >450                   Gray                 300-450                    Negative             <300    50-75           Positive            >900                  Gray                300-900                  Negative            <300      >75             Positive            >1800                  Gray                300-1800                  Negative            <300    High Sensitivity Troponin T [[id number]]  (Abnormal) Collected: 12/12/24 1926    Specimen: Blood Updated: 12/12/24 2004     HS Troponin T 28 ng/L     Narrative:      High Sensitive Troponin T Reference Range:  <14.0 ng/L- Negative Female for AMI  <22.0 ng/L- Negative Male for AMI  >=14 - Abnormal Female indicating possible myocardial injury.  >=22 - Abnormal Male indicating possible myocardial injury.   Clinicians would have to utilize clinical acumen, EKG, Troponin, and serial changes to determine if it is an Acute Myocardial Infarction or myocardial injury due to an underlying chronic condition.         CBC & Differential [[id number]]  (Abnormal) Collected: 12/12/24 1926    Specimen: Blood Updated: 12/12/24 1947    Narrative:      The following orders were created for panel order CBC & Differential.  Procedure                               Abnormality         Status                     ---------                               -----------         ------                     CBC Auto Differential[[id number]]        Abnormal            Final result                 Please view results for these  tests on the individual orders.    CBC Auto Differential [[id number]]  (Abnormal) Collected: 12/12/24 1926    Specimen: Blood Updated: 12/12/24 1947     WBC 7.90 10*3/mm3      RBC 5.24 10*6/mm3      Hemoglobin 14.7 g/dL      Hematocrit 47.3 %      MCV 90.3 fL      MCH 28.1 pg      MCHC 31.1 g/dL      RDW 14.1 %      RDW-SD 46.6 fl      MPV 9.4 fL      Platelets 324 10*3/mm3      Neutrophil % 71.1 %      Lymphocyte % 14.6 %      Monocyte % 11.3 %      Eosinophil % 2.2 %      Basophil % 0.5 %      Immature Grans % 0.3 %      Neutrophils, Absolute 5.63 10*3/mm3      Lymphocytes, Absolute 1.15 10*3/mm3      Monocytes, Absolute 0.89 10*3/mm3      Eosinophils, Absolute 0.17 10*3/mm3      Basophils, Absolute 0.04 10*3/mm3      Immature Grans, Absolute 0.02 10*3/mm3      nRBC 0.0 /100 WBC     [place] Draw [[id number]] Collected: 12/12/24 1926    Specimen: Blood Updated: 12/12/24 1945    Narrative:      The following orders were created for panel order [place] Draw.  Procedure                               Abnormality         Status                     ---------                               -----------         ------                     Green Top (Gel)[[id number]]                                  Final result               Lavender Top[[id number]]                                     Final result               Red Top[[id number]]                                          Final result               Gray Top[[id number]]                                         Final result               Light Blue Top[[id number]]                                   Final result                 Please view results for these tests on the individual orders.    Green Top (Gel) [[id number]] Collected: 12/12/24 1926    Specimen: Blood Updated: 12/12/24 1945     Extra Tube Hold for add-ons.     Comment: Auto resulted.       Lavender Top [[id number]] Collected: 12/12/24 1926    Specimen: Blood Updated: 12/12/24 1945     Extra Tube hold for add-on     Comment: Auto  resulted       Red Top [[id number]] Collected: 12/12/24 1926    Specimen: Blood Updated: 12/12/24 1945     Extra Tube Hold for add-ons.     Comment: Auto resulted.       Gray Top [[id number]] Collected: 12/12/24 1926    Specimen: Blood Updated: 12/12/24 1945     Extra Tube Hold for add-ons.     Comment: Auto resulted.       Light Blue Top [[id number]] Collected: 12/12/24 1926    Specimen: Blood Updated: 12/12/24 1945     Extra Tube Hold for add-ons.     Comment: Auto resulted       POC Glucose Once [[id number]]  (Normal) Collected: 12/12/24 1906    Specimen: Blood Updated: 12/12/24 1918     Glucose 123 mg/dL      Comment: : [id number] KittyMeter ID: [id number]             Imaging Results (Last 24 Hours)       Procedure Component Value Units Date/Time    FL Video Swallow With Speech Single Contrast [[id number]] Collected: 12/13/24 1007     Updated: 12/13/24 1014    Narrative:      EXAM: FL VIDEO SWALLOW W SPEECH SINGLE-CONTRAST- - 12/13/2024 8:20 AM     HISTORY: r/o aspiration; J18.9-Pneumonia, unspecified organism;  R09.02-Hypoxemia       COMPARISON: None.     Number of images: 1  Fluoroscopy time: 2.02  Entrance dose: 8.03 mGy     TECHNIQUE:   The examination was done in conjunction with Speech  Therapy. Different contrast coated food consistencies using different  bolus sizes and different maneuvers were individually evaluated. Oral  motor transport was evaluated by speech therapist.     FINDINGS:    Honey thickness - No aspiration or penetration visualized.  Nectar thickness - No aspiration or penetration visualized.  Thin liquid barium - One episode of aspiration occurred.  Pudding consistency - No aspiration or penetration visualized.  Mechanical soft (pudding with canned fruit) - No aspiration or  penetration visualized.  Regular diet (pudding on cracker) - No aspiration or penetration  visualized.       Impression:      1. 1 episode of penetration occurred with thin liquid barium.  2. Please see separate  Speech Therapy report for rehabilitation swallow  recommendations and procedural details.     This report was signed and finalized on 12/13/2024 10:10 AM by Dr. Pan Wilkes MD.       CT Angiogram Chest [[id number]] Collected: 12/13/24 0905     Updated: 12/13/24 0932    Narrative:      EXAMINATION: CT ANGIOGRAM CHEST-      12/12/2024 8:55 PM     HISTORY: SOB, hypoxia, elevated ddimer     In order to have a CT radiation dose as low as reasonably achievable  Automated Exposure Control was utilized for adjustment of the mA and/or  KV according to patient size.     Total DLP = 264. mGy.cm     CT angiography of the chest is performed after intravenous contrast  enhancement.     Images are acquired in axial plane and subsequent 2D reconstruction  coronal and sagittal planes and 3D maximum intensity projection image  reconstruction.     There is no previous similar study for comparison. Correlation made with  chest radiograph dated 12/12/2024.     There is normal opacification of the pulmonary artery and branches  bilaterally. There are no filling defects in the visualized/opacified  pulmonary arterial bed.     RV/LV ratio/33 which is normal. No finding to suggest right heart  strain.     Atheromatous change of thoracic aorta seen. No aneurysmal dilatation or  dissection.     Severe atheromatous changes of coronary arteries are noted.     There is moderate cardiomegaly.     There is no evidence of mediastinal or hilar mass or lymphadenopathy.     Limited visualized soft tissues of the neck are unremarkable. No mass or  lymphadenopathy.     No axillary lymphadenopathy.     Lungs are poorly expanded.     There are atelectatic changes in the lower lungs. There is foci of  consolidation with air bronchograms in the lower lobe bilaterally left  more than the right. This may represent an evolving  infiltrate/aspiration pneumonitis?. The central airway is patent.     Moderately dilated entire esophagus is seen with air-fluid  level in the  distal esophagus. There are postsurgical changes of the stomach.     The limited visualized/included abdomen show moderately distended  gallbladder with layering multiple small opaque stones. The remaining  visualized abdomen is unremarkable.     Images reviewed in bone windows show moderate chronic degenerative  changes of the thoracic spine. No acute bony abnormality.       Impression:      1. No evidence of an acute pulmonary embolism.  2. Atelectatic changes in the lower lungs bilaterally. A probable  infiltrate/aspiration pneumonitis in the lower lungs, left more than the  right.  3. Moderately dilated esophagus with fluid level may suggest  gastroesophageal reflux?.  4. Cholelithiasis. No finding to suggest cholecystitis.     The above study was initially reviewed and reported by [facility]. I do not  find any discrepancies.                                            This report was signed and finalized on 12/13/2024 9:28 AM by Dr. Floyd Lewis MD.       XR Chest 1 View [[id number]] Collected: 12/12/24 1943     Updated: 12/12/24 1947    Narrative:      EXAM: XR CHEST 1 VW- 12/12/2024 6:40 PM     HISTORY: SOA Triage Protocol       COMPARISON: 2/15/2017.     TECHNIQUE: Single frontal radiograph of the chest was obtained.     FINDINGS:     Support Devices: None.     Cardiac and Mediastinal Silhouettes: Normal.     Lungs/Pleura: Mild right basilar bandlike parenchymal opacities. No  sizable pleural effusion. No visible pneumothorax.     Osseous structures: No acute osseous finding.     Other: None.       Impression:         Mild right basilar bandlike parenchymal opacities, favor atelectasis.           This report was signed and finalized on 12/12/2024 7:44 PM by Omari Posadas.                 ASSESSMENT AND PLAN:      1.     Pneumonia    Assessment: 75 yo male who presents with aspiration pneumonia/pneumonitis    Plan:    Aspiration pneumonia/pneumonitis  Had aspiration event on Monday, now has  some radiographic findings of pneumonia.  Will start unasyn, nebs, and steroids.  Hopefully can improve over the weekend and discharge early next week    Dysphagia  Related to underlying neurodegenerative disease, will have speech follow    Type 2 Diabetes  Will continue lantus at 10 units plus sliding scale in addition to home trajenta, metformin and jardiance    Otherwise, continue home meds      Alcides Stein MD  14:19 CST 12/13/2024

## 2024-12-13 NOTE — ED NOTES
Nursing report ED to floor  Richard NOLAN Jr.  74 y.o.  male    HPI:   Chief Complaint   Patient presents with    Cough    Aspiration    Shortness of Breath       Admitting doctor:   Marcelino Weaver MD    Consulting provider(s):  Consults       No orders found for last 30 day(s).             Admitting diagnosis:   The primary encounter diagnosis was Pneumonia due to infectious organism, unspecified laterality, unspecified part of lung. Diagnoses of Hypoxia and Dysphagia, unspecified type were also pertinent to this visit.    Code status:   Current Code Status       Date Active Code Status Order ID Comments User Context       12/13/2024 0037 CPR (Attempt to Resuscitate) [id number]  Behzad Alarcon MD ED        Question Answer    Code Status (Patient has no pulse and is not breathing) CPR (Attempt to Resuscitate)    Medical Interventions (Patient has pulse or is breathing) Full Support    Level Of Support Discussed With Patient                    Allergies:   Patient has no known allergies.    Intake and Output  No intake or output data in the 24 hours ending 12/13/24 1101    Weight:       12/12/24  1740   Weight: 94.3 kg (208 lb)       Most recent vitals:   Vitals:    12/13/24 0225 12/13/24 0348 12/13/24 0701 12/13/24 0732   BP: 162/71  147/84 (!) 192/99   BP Location: Right arm      Patient Position: Sitting      Pulse: 83 80 85 86   Resp: 18      Temp:       TempSrc:       SpO2: 96% 96% 94% 90%   Weight:       Height:         Oxygen Therapy: .    Active LDAs/IV Access:   Lines, Drains & Airways       Active LDAs       Name Placement date Placement time Site Days    Peripheral IV 12/12/24 1801 Left;Posterior Forearm 12/12/24  1801  Forearm  less than 1                    Labs (abnormal labs have a star):   Labs Reviewed   COMPREHENSIVE METABOLIC PANEL - Abnormal; Notable for the following components:       Result Value    Glucose 125 (*)     Creatinine 0.55 (*)     ALT (SGPT) 58 (*)     AST (SGOT) 106 (*)      BUN/Creatinine Ratio 32.7 (*)     All other components within normal limits    Narrative:     GFR Categories in Chronic Kidney Disease (CKD)      GFR Category          GFR (mL/min/1.73)    Interpretation  G1                     90 or greater         Normal or high (1)  G2                      60-89                Mild decrease (1)  G3a                   45-59                Mild to moderate decrease  G3b                   30-44                Moderate to severe decrease  G4                    15-29                Severe decrease  G5                    14 or less           Kidney failure          (1)In the absence of evidence of kidney disease, neither GFR category G1 or G2 fulfill the criteria for CKD.    eGFR calculation 2021 CKD-EPI creatinine equation, which does not include race as a factor   TROPONIN - Abnormal; Notable for the following components:    HS Troponin T 28 (*)     All other components within normal limits    Narrative:     High Sensitive Troponin T Reference Range:  <14.0 ng/L- Negative Female for AMI  <22.0 ng/L- Negative Male for AMI  >=14 - Abnormal Female indicating possible myocardial injury.  >=22 - Abnormal Male indicating possible myocardial injury.   Clinicians would have to utilize clinical acumen, EKG, Troponin, and serial changes to determine if it is an Acute Myocardial Infarction or myocardial injury due to an underlying chronic condition.        CBC WITH AUTO DIFFERENTIAL - Abnormal; Notable for the following components:    MCHC 31.1 (*)     Lymphocyte % 14.6 (*)     All other components within normal limits   D-DIMER, QUANTITATIVE - Abnormal; Notable for the following components:    D-Dimer, Quantitative 0.79 (*)     All other components within normal limits    Narrative:     According to the assay 's published package insert, a normal (<0.50 MCGFEU/mL) D-dimer result in conjunction with a non-high clinical probability assessment, excludes deep vein thrombosis (DVT) and  "pulmonary embolism (PE) with high sensitivity.    D-dimer values increase with age and this can make VTE exclusion of an older population difficult. To address this, the [employer], based on best available evidence and recent guidelines, recommends that clinicians use age-adjusted D-dimer thresholds in patients greater than 50 years of age with: a) a low probability of PE who do not meet all Pulmonary Embolism Rule Out Criteria, or b) in those with intermediate probability of PE.   The formula for an age-adjusted D-dimer cut-off is \"age/100\".  For example, a 60 year old patient would have an age-adjusted cut-off of 0.60 MCGFEU/mL and an 80 year old 0.80 MCGFEU/mL.   COMPREHENSIVE METABOLIC PANEL - Abnormal; Notable for the following components:    Creatinine 0.37 (*)     ALT (SGPT) 46 (*)     AST (SGOT) 83 (*)     BUN/Creatinine Ratio 40.5 (*)     All other components within normal limits    Narrative:     GFR Categories in Chronic Kidney Disease (CKD)      GFR Category          GFR (mL/min/1.73)    Interpretation  G1                     90 or greater         Normal or high (1)  G2                      60-89                Mild decrease (1)  G3a                   45-59                Mild to moderate decrease  G3b                   30-44                Moderate to severe decrease  G4                    15-29                Severe decrease  G5                    14 or less           Kidney failure          (1)In the absence of evidence of kidney disease, neither GFR category G1 or G2 fulfill the criteria for CKD.    eGFR calculation 2021 CKD-EPI creatinine equation, which does not include race as a factor   RESPIRATORY PANEL PCR W/ COVID-19 (SARS-COV-2), NP SWAB IN UTM/VTP, 2 HR TAT - Normal    Narrative:     In the setting of a positive respiratory panel with a viral infection PLUS a negative procalcitonin without other underlying concern for bacterial infection, consider observing off " antibiotics or discontinuation of antibiotics and continue supportive care. If the respiratory panel is positive for atypical bacterial infection (Bordetella pertussis, Chlamydophila pneumoniae, or Mycoplasma pneumoniae), consider antibiotic de-escalation to target atypical bacterial infection.   BNP (IN-HOUSE) - Normal    Narrative:     This assay is used as an aid in the diagnosis of individuals suspected of having heart failure. It can be used as an aid in the diagnosis of acute decompensated heart failure (ADHF) in patients presenting with signs and symptoms of ADHF to the emergency department (ED). In addition, NT-proBNP of <300 pg/mL indicates ADHF is not likely.    Age Range Result Interpretation  NT-proBNP Concentration (pg/mL:      <50             Positive            >450                   Gray                 300-450                    Negative             <300    50-75           Positive            >900                  Gray                300-900                  Negative            <300      >75             Positive            >1800                  Gray                300-1800                  Negative            <300   CBC (NO DIFF) - Normal   POCT GLUCOSE FINGERSTICK - Normal   POCT GLUCOSE FINGERSTICK - Normal   POCT GLUCOSE FINGERSTICK - Normal   RESPIRATORY CULTURE   RAINBOW DRAW    Narrative:     The following orders were created for panel order [place] Draw.  Procedure                               Abnormality         Status                     ---------                               -----------         ------                     Green Top (Gel)[[id number]]                                  Final result               Lavender Top[[id number]]                                     Final result               Red Top[[id number]]                                          Final result               Gray Top[[id number]]                                         Final result               Light Blue  Top[[id number]]                                   Final result                 Please view results for these tests on the individual orders.   HIGH SENSITIVITIY TROPONIN T 1HR    Narrative:     High Sensitive Troponin T Reference Range:  <14.0 ng/L- Negative Female for AMI  <22.0 ng/L- Negative Male for AMI  >=14 - Abnormal Female indicating possible myocardial injury.  >=22 - Abnormal Male indicating possible myocardial injury.   Clinicians would have to utilize clinical acumen, EKG, Troponin, and serial changes to determine if it is an Acute Myocardial Infarction or myocardial injury due to an underlying chronic condition.        POCT GLUCOSE FINGERSTICK   POCT GLUCOSE FINGERSTICK   POCT GLUCOSE FINGERSTICK   POCT GLUCOSE FINGERSTICK   POCT GLUCOSE FINGERSTICK   CBC AND DIFFERENTIAL    Narrative:     The following orders were created for panel order CBC & Differential.  Procedure                               Abnormality         Status                     ---------                               -----------         ------                     CBC Auto Differential[[id number]]        Abnormal            Final result                 Please view results for these tests on the individual orders.   GREEN TOP   LAVENDER TOP   RED TOP   GRAY TOP   LIGHT BLUE TOP       Meds given in ED:   Medications   sodium chloride 0.9 % flush 10 mL (has no administration in time range)   aspirin EC tablet 81 mg (81 mg Oral Given 12/13/24 1000)   famotidine (PEPCID) tablet 20 mg (20 mg Oral Given 12/13/24 0220)   FLUoxetine (PROzac) capsule 20 mg (20 mg Oral Given 12/13/24 1000)   levothyroxine (SYNTHROID, LEVOTHROID) tablet 25 mcg (25 mcg Oral Given 12/13/24 0549)   lisinopril (PRINIVIL,ZESTRIL) tablet 10 mg (10 mg Oral Given 12/13/24 1005)   pantoprazole (PROTONIX) EC tablet 40 mg (40 mg Oral Not Given 12/13/24 0549)   dextrose (GLUTOSE) oral gel 15 g (has no administration in time range)   dextrose (D50W) (25 g/50 mL) IV injection 25 g  (has no administration in time range)   glucagon (GLUCAGEN) injection 1 mg (has no administration in time range)   Insulin Lispro (humaLOG) injection 2-9 Units (0 Units Subcutaneous Hold 12/13/24 0739)   ipratropium-albuterol (DUO-NEB) nebulizer solution 3 mL (has no administration in time range)   sodium chloride 0.9 % flush 10 mL (10 mL Intravenous Given 12/13/24 1001)   sodium chloride 0.9 % flush 10 mL (has no administration in time range)   sodium chloride 0.9 % infusion 40 mL (has no administration in time range)   ondansetron ODT (ZOFRAN-ODT) disintegrating tablet 4 mg (has no administration in time range)     Or   ondansetron (ZOFRAN) injection 4 mg (has no administration in time range)   ampicillin-sulbactam (UNASYN) 3 g in sodium chloride 0.9 % 100 mL MBP (3 g Intravenous New Bag 12/13/24 1001)   methylPREDNISolone sodium succinate (SOLU-Medrol) injection 40 mg (40 mg Intravenous Given 12/13/24 1000)   Enoxaparin Sodium (LOVENOX) syringe 40 mg (40 mg Subcutaneous Given 12/13/24 1001)   empagliflozin (JARDIANCE) tablet 25 mg (25 mg Oral Given 12/13/24 1000)   linagliptin (TRADJENTA) tablet 5 mg (5 mg Oral Given 12/13/24 1000)     And   metFORMIN (GLUCOPHAGE) tablet 500 mg (500 mg Oral Given 12/13/24 1000)   cefTRIAXone (ROCEPHIN) 1,000 mg in sodium chloride 0.9 % 100 mL MBP (0 mg Intravenous Stopped 12/12/24 2043)   iopamidol (ISOVUE-370) 76 % injection 100 mL (100 mL Intravenous Given 12/12/24 2225)   barium sulfate (VARIBAR THIN LIQUID) oral suspension 55 mL (55 mL Oral Given 12/13/24 0925)   barium sulfate oral suspension 60 mL (60 mL Oral Given 12/13/24 0925)   barium sulfate oral suspension 60 mL (60 mL Oral Given 12/13/24 0925)   barium sulfate (VARIBAR PUDDING) oral paste 25 mL (25 mL Oral Given 12/13/24 0925)           NIH Stroke Scale:  Interval: baseline    Isolation/Infection(s):  No active isolations   No active infections     COVID Testing  Collected .  Resulted .    Nursing report ED to  floor:  Mental status: .  Ambulatory status: .  Precautions: .    ED nurse phone extentsion- ..

## 2024-12-13 NOTE — CASE MANAGEMENT/SOCIAL WORK
Discharge Planning Assessment   [place]     Patient Name: Richard NOLAN Jr.  MRN: [id number]  Today's Date: 12/13/2024    Admit Date: 12/12/2024        Discharge Needs Assessment       Row Name 12/13/24 1020       Living Environment    People in Home spouse    Current Living Arrangements home    Potentially Unsafe Housing Conditions none    Primary Care Provided by self;spouse/significant other    Provides Primary Care For no one    Family Caregiver if Needed spouse    Quality of Family Relationships helpful;involved;supportive    Able to Return to Prior Arrangements yes       Resource/Environmental Concerns    Resource/Environmental Concerns none    Transportation Concerns none       Transition Planning    Patient/Family Anticipates Transition to home with family;home with help/services  Has recently been set up with [facility] through [facility]    Patient/Family Anticipated Services at Transition --  Continue with [facility] through [facility]    Transportation Anticipated family or friend will provide       Discharge Needs Assessment    Readmission Within the Last 30 Days no previous admission in last 30 days    Current Outpatient/Agency/Support Group homecare agency  [facility] through [facility]    Equipment Currently Used at Home cane, straight;walker, rolling;commode    Concerns to be Addressed no discharge needs identified;denies needs/concerns at this time    Anticipated Changes Related to Illness none    Discharge Coordination/Progress Spoke with pt who states he plans on returning home with his spouse upon DC, has just recently been set up with [facility] through the [facility], he said they just hadn't set a visit schedule up yet but they will when he returns home.  Has a PCP and Rx coverage.  Will follow for DC needs.                   Discharge Plan    No documentation.                 Continued Care and Services - Admitted Since 12/12/2024    No active coordination exists for this encounter.           Demographic Summary    No documentation.                  Functional Status    No documentation.                  Psychosocial    No documentation.                  Abuse/Neglect    No documentation.                  Legal    No documentation.                  Substance Abuse    No documentation.                  Patient Forms    No documentation.                     Kiesha Nino RN

## 2024-12-13 NOTE — H&P
[facility] Medicine Services  HISTORY AND PHYSICAL    Date of Admission: 12/12/2024  Primary Care Physician: Marceilno Weaver MD    Subjective   Primary Historian: Patient    Chief Complaint:     Cough  Associated symptoms include shortness of breath.   Aspiration  Symptoms include cough.    Shortness of Breath      HPI:  This is a 74-year-old male patient with a medical history of muscular dystrophy, hypertension, diabetes mellitus type 2, GERD, presenting to the ED with shortness of breath and cough.  As reported he started having chills at home that followed an episode of aspiration on Monday.  He was also having a productive cough and difficulty breathing.  He denies any chest pain, any fever, abdominal pain, nausea vomiting or diarrhea.  He continues to have recurrent aspiration/choking when eating.        Review of Systems   Respiratory:  Positive for cough and shortness of breath.       Otherwise complete ROS reviewed and negative except as mentioned in the HPI.    Past Medical History:   Past Medical History:   Diagnosis Date    Cancer     PROSTATE CANCER     Colon polyps     Cystitis     Depression     Diabetes mellitus     Enlarged prostate     Esophagitis     GERD (gastroesophageal reflux disease)     [place]     Hypertension     Kidney stone     OPCD (olivopontocerebellar degeneration)     PONV (postoperative nausea and vomiting)     Skin cancer     Varicose vein of leg      Past Surgical History:  Past Surgical History:   Procedure Laterality Date    COLONOSCOPY N/A 9/19/2022    Procedure: COLONOSCOPY WITH ANESTHESIA;  Surgeon: Adair Beltran MD;  Location: [facility];  Service: Gastroenterology;  Laterality: N/A;  preop; hx of polyps  postop; polyps   PCP Marcelino Weaver     ENDOSCOPY N/A 9/19/2022    Procedure: ESOPHAGOGASTRODUODENOSCOPY WITH ANESTHESIA;  Surgeon: Adair Beltran MD;  Location: [facility];  Service:  Gastroenterology;  Laterality: N/A;  preop; dysphagia  postop; normal   PCP Marcelino Weaver     ENDOSCOPY AND COLONOSCOPY  11/13/2015    post gastric bypass, dilation, very small internal hemorroid    EYE SURGERY Bilateral     Cataract    GASTRIC BYPASS      NASAL SEPTUM SURGERY      NECK SURGERY      ORIF TIBIA/FIBULA FRACTURES Right     PROSTATE BIOPSY N/A 05/13/2021    Procedure: PROSTATE ULTRASOUND BIOPSY MRI FUSION WITH URONAV;  Surgeon: Gregorio Lange MD;  Location: [facility];  Service: Urology;  Laterality: N/A;    VEIN LIGATION AND STRIPPING       Social History:  reports that he has never smoked. He has never used smokeless tobacco. He reports current alcohol use. He reports that he does not use drugs.    Family History: family history includes Colon polyps in his brother and mother.       Allergies:  No Known Allergies    Medications:  Prior to Admission medications    Medication Sig Start Date End Date Taking? Authorizing Provider   aspirin 81 MG EC tablet Take 1 tablet by mouth Daily.    Emergency, Nurse Quyen RN   BD Pen Needle Belen 2nd Gen 32G X 4 MM misc  9/5/21   Mariza Turner MD   Continuous Blood Gluc Sensor (FreeStyle Narciso 14 Day Sensor) misc USE AS DIRECTED EVERY 14 DAYS 7/22/21   Mariza Turner MD   diphenhydrAMINE-acetaminophen (TYLENOL PM)  MG tablet per tablet Take 1 tablet by mouth At Night As Needed for Sleep.    Mariza Turner MD   empagliflozin (JARDIANCE) 25 MG tablet tablet Take 1 tablet by mouth Daily.    Mariza Turner MD   famotidine (Pepcid) 20 MG tablet Take 1 tablet twice a day by oral route.    Mariza Turner MD   FLUoxetine (PROzac) 20 MG capsule Take 1 capsule by mouth Daily. 6/8/21   Mariza Turner MD   insulin glargine (LANTUS) 100 UNIT/ML injection Inject 15 Units under the skin into the appropriate area as directed 2 (Two) Times a Day.    Emergency, Nurse Quyen RN   insulin lispro (humaLOG) 100 UNIT/ML injection  "Inject  under the skin into the appropriate area as directed 3 (Three) Times a Day Before Meals. Sliding scale.  6-10 units depending on intake    Mariza Turner MD   Janumet  MG per tablet Take 1 tablet by mouth 2 (Two) Times a Day.    Mariza Turner MD   levothyroxine (SYNTHROID, LEVOTHROID) 25 MCG tablet Take 1 tablet by mouth Daily. 2/9/23   Mariza Turner MD   lisinopril (PRINIVIL,ZESTRIL) 10 MG tablet Take 1 tablet by mouth Daily.    Mariza Turner MD   omeprazole (priLOSEC) 40 MG capsule Take 1 capsule by mouth Daily.    Mariza Turner MD     I have utilized all available immediate resources to obtain, update, or review the patient's current medications (including all prescriptions, over-the-counter products, herbals, cannabis/cannabidiol products, and vitamin/mineral/dietary (nutritional) supplements).    Objective     Vital Signs: /80   Pulse 83   Temp 97.9 °F (36.6 °C) (Oral)   Resp 18   Ht 177.8 cm (70\")   Wt 94.3 kg (208 lb)   SpO2 95%   BMI 29.84 kg/m²   Physical Exam  Constitutional:       Appearance: Normal appearance.   Cardiovascular:      Rate and Rhythm: Normal rate and regular rhythm.      Pulses: Normal pulses.      Heart sounds: Normal heart sounds. No murmur heard.  Pulmonary:      Effort: Pulmonary effort is normal. No respiratory distress.      Breath sounds: Rhonchi present.   Abdominal:      General: Bowel sounds are normal. There is no distension.      Palpations: Abdomen is soft.      Tenderness: There is no abdominal tenderness. There is no guarding.   Musculoskeletal:      Right lower leg: No edema.      Left lower leg: No edema.   Skin:     General: Skin is warm.   Neurological:      General: No focal deficit present.      Mental Status: He is alert and oriented to person, place, and time.              Results Reviewed:  Lab Results (last 24 hours)       Procedure Component Value Units Date/Time    High Sensitivity Troponin T 1Hr " [[id number]] Collected: 12/12/24 2035    Specimen: Blood Updated: 12/12/24 2132     HS Troponin T 21 ng/L      Troponin T Delta -7 ng/L      Troponin T % Change -25 %     Narrative:      High Sensitive Troponin T Reference Range:  <14.0 ng/L- Negative Female for AMI  <22.0 ng/L- Negative Male for AMI  >=14 - Abnormal Female indicating possible myocardial injury.  >=22 - Abnormal Male indicating possible myocardial injury.   Clinicians would have to utilize clinical acumen, EKG, Troponin, and serial changes to determine if it is an Acute Myocardial Infarction or myocardial injury due to an underlying chronic condition.         Respiratory Panel PCR w/COVID-19(SARS-CoV-2) GARRISON/ROGELIO/JONATHON/PAD/COR/ERNIE In-House, NP Swab in UTM/VTM, 2 HR TAT - Swab, Nasopharynx [[id number]]  (Normal) Collected: 12/12/24 1927    Specimen: Swab from Nasopharynx Updated: 12/12/24 2038     ADENOVIRUS, PCR Not Detected     Coronavirus 229E Not Detected     Coronavirus HKU1 Not Detected     Coronavirus NL63 Not Detected     Coronavirus OC43 Not Detected     COVID19 Not Detected     Human Metapneumovirus Not Detected     Human Rhinovirus/Enterovirus Not Detected     Influenza A PCR Not Detected     Influenza B PCR Not Detected     Parainfluenza Virus 1 Not Detected     Parainfluenza Virus 2 Not Detected     Parainfluenza Virus 3 Not Detected     Parainfluenza Virus 4 Not Detected     RSV, PCR Not Detected     Bordetella pertussis pcr Not Detected     Bordetella parapertussis PCR Not Detected     Chlamydophila pneumoniae PCR Not Detected     Mycoplasma pneumo by PCR Not Detected    Narrative:      In the setting of a positive respiratory panel with a viral infection PLUS a negative procalcitonin without other underlying concern for bacterial infection, consider observing off antibiotics or discontinuation of antibiotics and continue supportive care. If the respiratory panel is positive for atypical bacterial infection (Bordetella pertussis,  "Chlamydophila pneumoniae, or Mycoplasma pneumoniae), consider antibiotic de-escalation to target atypical bacterial infection.    D-dimer, Quantitative [[id number]]  (Abnormal) Collected: 12/12/24 1926    Specimen: Blood Updated: 12/12/24 2018     D-Dimer, Quantitative 0.79 MCGFEU/mL     Narrative:      According to the assay 's published package insert, a normal (<0.50 MCGFEU/mL) D-dimer result in conjunction with a non-high clinical probability assessment, excludes deep vein thrombosis (DVT) and pulmonary embolism (PE) with high sensitivity.    D-dimer values increase with age and this can make VTE exclusion of an older population difficult. To address this, the [employer], based on best available evidence and recent guidelines, recommends that clinicians use age-adjusted D-dimer thresholds in patients greater than 50 years of age with: a) a low probability of PE who do not meet all Pulmonary Embolism Rule Out Criteria, or b) in those with intermediate probability of PE.   The formula for an age-adjusted D-dimer cut-off is \"age/100\".  For example, a 60 year old patient would have an age-adjusted cut-off of 0.60 MCGFEU/mL and an 80 year old 0.80 MCGFEU/mL.    Comprehensive Metabolic Panel [[id number]]  (Abnormal) Collected: 12/12/24 1926    Specimen: Blood Updated: 12/12/24 2008     Glucose 125 mg/dL      BUN 18 mg/dL      Creatinine 0.55 mg/dL      Sodium 139 mmol/L      Potassium 4.6 mmol/L      Chloride 101 mmol/L      CO2 24.0 mmol/L      Calcium 9.6 mg/dL      Total Protein 8.1 g/dL      Albumin 4.2 g/dL      ALT (SGPT) 58 U/L      AST (SGOT) 106 U/L      Alkaline Phosphatase 99 U/L      Total Bilirubin 0.4 mg/dL      Globulin 3.9 gm/dL      A/G Ratio 1.1 g/dL      BUN/Creatinine Ratio 32.7     Anion Gap 14.0 mmol/L      eGFR 104.6 mL/min/1.73     Narrative:      GFR Categories in Chronic Kidney Disease (CKD)      GFR Category          GFR (mL/min/1.73)    Interpretation  G1       "               90 or greater         Normal or high (1)  G2                      60-89                Mild decrease (1)  G3a                   45-59                Mild to moderate decrease  G3b                   30-44                Moderate to severe decrease  G4                    15-29                Severe decrease  G5                    14 or less           Kidney failure          (1)In the absence of evidence of kidney disease, neither GFR category G1 or G2 fulfill the criteria for CKD.    eGFR calculation 2021 CKD-EPI creatinine equation, which does not include race as a factor    BNP [[id number]]  (Normal) Collected: 12/12/24 1926    Specimen: Blood Updated: 12/12/24 2005     proBNP 325.8 pg/mL     Narrative:      This assay is used as an aid in the diagnosis of individuals suspected of having heart failure. It can be used as an aid in the diagnosis of acute decompensated heart failure (ADHF) in patients presenting with signs and symptoms of ADHF to the emergency department (ED). In addition, NT-proBNP of <300 pg/mL indicates ADHF is not likely.    Age Range Result Interpretation  NT-proBNP Concentration (pg/mL:      <50             Positive            >450                   Gray                 300-450                    Negative             <300    50-75           Positive            >900                  Gray                300-900                  Negative            <300      >75             Positive            >1800                  Gray                300-1800                  Negative            <300    High Sensitivity Troponin T [[id number]]  (Abnormal) Collected: 12/12/24 1926    Specimen: Blood Updated: 12/12/24 2004     HS Troponin T 28 ng/L     Narrative:      High Sensitive Troponin T Reference Range:  <14.0 ng/L- Negative Female for AMI  <22.0 ng/L- Negative Male for AMI  >=14 - Abnormal Female indicating possible myocardial injury.  >=22 - Abnormal Male indicating possible myocardial injury.    Clinicians would have to utilize clinical acumen, EKG, Troponin, and serial changes to determine if it is an Acute Myocardial Infarction or myocardial injury due to an underlying chronic condition.         CBC & Differential [[id number]]  (Abnormal) Collected: 12/12/24 1926    Specimen: Blood Updated: 12/12/24 1947    Narrative:      The following orders were created for panel order CBC & Differential.  Procedure                               Abnormality         Status                     ---------                               -----------         ------                     CBC Auto Differential[[id number]]        Abnormal            Final result                 Please view results for these tests on the individual orders.    CBC Auto Differential [[id number]]  (Abnormal) Collected: 12/12/24 1926    Specimen: Blood Updated: 12/12/24 1947     WBC 7.90 10*3/mm3      RBC 5.24 10*6/mm3      Hemoglobin 14.7 g/dL      Hematocrit 47.3 %      MCV 90.3 fL      MCH 28.1 pg      MCHC 31.1 g/dL      RDW 14.1 %      RDW-SD 46.6 fl      MPV 9.4 fL      Platelets 324 10*3/mm3      Neutrophil % 71.1 %      Lymphocyte % 14.6 %      Monocyte % 11.3 %      Eosinophil % 2.2 %      Basophil % 0.5 %      Immature Grans % 0.3 %      Neutrophils, Absolute 5.63 10*3/mm3      Lymphocytes, Absolute 1.15 10*3/mm3      Monocytes, Absolute 0.89 10*3/mm3      Eosinophils, Absolute 0.17 10*3/mm3      Basophils, Absolute 0.04 10*3/mm3      Immature Grans, Absolute 0.02 10*3/mm3      nRBC 0.0 /100 WBC     [place] Draw [[id number]] Collected: 12/12/24 1926    Specimen: Blood Updated: 12/12/24 1945    Narrative:      The following orders were created for panel order [place] Draw.  Procedure                               Abnormality         Status                     ---------                               -----------         ------                     Green Top (Gel)[[id number]]                                  Final result               Lavender  Top[[id number]]                                     Final result               Red Top[[id number]]                                          Final result               Gray Top[[id number]]                                         Final result               Light Blue Top[[id number]]                                   Final result                 Please view results for these tests on the individual orders.    Green Top (Gel) [[id number]] Collected: 12/12/24 1926    Specimen: Blood Updated: 12/12/24 1945     Extra Tube Hold for add-ons.     Comment: Auto resulted.       Lavender Top [[id number]] Collected: 12/12/24 1926    Specimen: Blood Updated: 12/12/24 1945     Extra Tube hold for add-on     Comment: Auto resulted       Red Top [[id number]] Collected: 12/12/24 1926    Specimen: Blood Updated: 12/12/24 1945     Extra Tube Hold for add-ons.     Comment: Auto resulted.       Gray Top [[id number]] Collected: 12/12/24 1926    Specimen: Blood Updated: 12/12/24 1945     Extra Tube Hold for add-ons.     Comment: Auto resulted.       Light Blue Top [[id number]] Collected: 12/12/24 1926    Specimen: Blood Updated: 12/12/24 1945     Extra Tube Hold for add-ons.     Comment: Auto resulted       POC Glucose Once [[id number]]  (Normal) Collected: 12/12/24 1906    Specimen: Blood Updated: 12/12/24 1918     Glucose 123 mg/dL      Comment: : [id number] Phillips KittyMeter ID: [id number]             Imaging Results (Last 24 Hours)       Procedure Component Value Units Date/Time    CT Angiogram Chest [[id number]] Resulted: 12/12/24 2155     Updated: 12/12/24 2227    XR Chest 1 View [[id number]] Collected: 12/12/24 1943     Updated: 12/12/24 1947    Narrative:      EXAM: XR CHEST 1 VW- 12/12/2024 6:40 PM     HISTORY: [facility] Triage Protocol       COMPARISON: 2/15/2017.     TECHNIQUE: Single frontal radiograph of the chest was obtained.     FINDINGS:     Support Devices: None.     Cardiac and Mediastinal Silhouettes: Normal.     Lungs/Pleura: Mild  right basilar bandlike parenchymal opacities. No  sizable pleural effusion. No visible pneumothorax.     Osseous structures: No acute osseous finding.     Other: None.       Impression:         Mild right basilar bandlike parenchymal opacities, favor atelectasis.           This report was signed and finalized on 12/12/2024 7:44 PM by Omari Posadas.             I have personally reviewed and interpreted the radiology studies and ECG obtained at time of admission.     Assessment / Plan   Assessment:   Active Hospital Problems    Diagnosis     **Pneumonia        Assessment and plan.    #Acute hypoxemic respiratory failure.  #Pneumonia, rule out aspiration.  #Atelectasis  #History of muscular dystrophy.  #History of diabetes type 2.  #Elevated LFTs.    -Admitting to floor with remote telemetry.  -ED started him on ceftriaxone, will continue empirically and adding doxycycline.  -Will add sputum culture.  -Nebulized medication as needed.  -Incentive spirometry and oxygen as needed.  -I placed an order for swallowing evaluation given his recurrent aspirations and choking.  -CTA chest did not show any PE.  -Insulin corrective scale for now, day team may need to resume his basal insulin once med rec is completed.  -Elevated LFTs, trending them tomorrow, and if still elevated, day team may need to do an ultrasound.  -DVT prophylaxis with heparin subcu.    Med rec is not completed.  Day team to follow.    I discussed CODE STATUS with the patient and he wants to be full code.    Electronically signed by Behzad Alarcon MD, 12/13/24, 00:37 CST.

## 2024-12-13 NOTE — PLAN OF CARE
Goal Outcome Evaluation:      ALOX4. NC 2l. Voiding via urinal. [place].

## 2024-12-14 LAB
ANION GAP SERPL CALCULATED.3IONS-SCNC: 13 MMOL/L (ref 5–15)
BUN SERPL-MCNC: 17 MG/DL (ref 8–23)
BUN/CREAT SERPL: 37.8 (ref 7–25)
CALCIUM SPEC-SCNC: 9 MG/DL (ref 8.6–10.5)
CHLORIDE SERPL-SCNC: 100 MMOL/L (ref 98–107)
CO2 SERPL-SCNC: 25 MMOL/L (ref 22–29)
CREAT SERPL-MCNC: 0.45 MG/DL (ref 0.76–1.27)
DEPRECATED RDW RBC AUTO: 45.3 FL (ref 37–54)
EGFRCR SERPLBLD CKD-EPI 2021: 111.2 ML/MIN/1.73
ERYTHROCYTE [DISTWIDTH] IN BLOOD BY AUTOMATED COUNT: 14.2 % (ref 12.3–15.4)
GLUCOSE BLDC GLUCOMTR-MCNC: 149 MG/DL (ref 70–130)
GLUCOSE BLDC GLUCOMTR-MCNC: 236 MG/DL (ref 70–130)
GLUCOSE BLDC GLUCOMTR-MCNC: 243 MG/DL (ref 70–130)
GLUCOSE BLDC GLUCOMTR-MCNC: 259 MG/DL (ref 70–130)
GLUCOSE SERPL-MCNC: 175 MG/DL (ref 65–99)
HCT VFR BLD AUTO: 42.4 % (ref 37.5–51)
HGB BLD-MCNC: 13.4 G/DL (ref 13–17.7)
MCH RBC QN AUTO: 28 PG (ref 26.6–33)
MCHC RBC AUTO-ENTMCNC: 31.6 G/DL (ref 31.5–35.7)
MCV RBC AUTO: 88.5 FL (ref 79–97)
PLATELET # BLD AUTO: 309 10*3/MM3 (ref 140–450)
PMV BLD AUTO: 9.2 FL (ref 6–12)
POTASSIUM SERPL-SCNC: 4.4 MMOL/L (ref 3.5–5.2)
RBC # BLD AUTO: 4.79 10*6/MM3 (ref 4.14–5.8)
SODIUM SERPL-SCNC: 138 MMOL/L (ref 136–145)
WBC NRBC COR # BLD AUTO: 9.47 10*3/MM3 (ref 3.4–10.8)

## 2024-12-14 PROCEDURE — 25010000002 METHYLPREDNISOLONE PER 40 MG: Performed by: STUDENT IN AN ORGANIZED HEALTH CARE EDUCATION/TRAINING PROGRAM

## 2024-12-14 PROCEDURE — 85027 COMPLETE CBC AUTOMATED: CPT | Performed by: STUDENT IN AN ORGANIZED HEALTH CARE EDUCATION/TRAINING PROGRAM

## 2024-12-14 PROCEDURE — 94799 UNLISTED PULMONARY SVC/PX: CPT

## 2024-12-14 PROCEDURE — 25010000002 AMPICILLIN-SULBACTAM PER 1.5 G: Performed by: STUDENT IN AN ORGANIZED HEALTH CARE EDUCATION/TRAINING PROGRAM

## 2024-12-14 PROCEDURE — 92526 ORAL FUNCTION THERAPY: CPT

## 2024-12-14 PROCEDURE — 80048 BASIC METABOLIC PNL TOTAL CA: CPT | Performed by: STUDENT IN AN ORGANIZED HEALTH CARE EDUCATION/TRAINING PROGRAM

## 2024-12-14 PROCEDURE — 25010000002 ENOXAPARIN PER 10 MG: Performed by: STUDENT IN AN ORGANIZED HEALTH CARE EDUCATION/TRAINING PROGRAM

## 2024-12-14 PROCEDURE — 36415 COLL VENOUS BLD VENIPUNCTURE: CPT | Performed by: STUDENT IN AN ORGANIZED HEALTH CARE EDUCATION/TRAINING PROGRAM

## 2024-12-14 PROCEDURE — 94761 N-INVAS EAR/PLS OXIMETRY MLT: CPT

## 2024-12-14 PROCEDURE — 63710000001 INSULIN LISPRO (HUMAN) PER 5 UNITS

## 2024-12-14 PROCEDURE — 82948 REAGENT STRIP/BLOOD GLUCOSE: CPT

## 2024-12-14 RX ORDER — IPRATROPIUM BROMIDE AND ALBUTEROL SULFATE 2.5; .5 MG/3ML; MG/3ML
3 SOLUTION RESPIRATORY (INHALATION)
Status: DISCONTINUED | OUTPATIENT
Start: 2024-12-14 | End: 2024-12-15

## 2024-12-14 RX ADMIN — IPRATROPIUM BROMIDE AND ALBUTEROL SULFATE 3 ML: .5; 3 SOLUTION RESPIRATORY (INHALATION) at 18:33

## 2024-12-14 RX ADMIN — EMPAGLIFLOZIN 25 MG: 25 TABLET, FILM COATED ORAL at 09:00

## 2024-12-14 RX ADMIN — AMPICILLIN SODIUM, SULBACTAM SODIUM 3 G: 2; 1 INJECTION, POWDER, FOR SOLUTION INTRAMUSCULAR; INTRAVENOUS at 14:40

## 2024-12-14 RX ADMIN — FLUOXETINE HYDROCHLORIDE 20 MG: 20 CAPSULE ORAL at 08:59

## 2024-12-14 RX ADMIN — METHYLPREDNISOLONE SODIUM SUCCINATE 40 MG: 40 INJECTION, POWDER, FOR SOLUTION INTRAMUSCULAR; INTRAVENOUS at 08:59

## 2024-12-14 RX ADMIN — AMPICILLIN SODIUM, SULBACTAM SODIUM 3 G: 2; 1 INJECTION, POWDER, FOR SOLUTION INTRAMUSCULAR; INTRAVENOUS at 20:39

## 2024-12-14 RX ADMIN — INSULIN LISPRO 4 UNITS: 100 INJECTION, SOLUTION INTRAVENOUS; SUBCUTANEOUS at 17:38

## 2024-12-14 RX ADMIN — INSULIN LISPRO 6 UNITS: 100 INJECTION, SOLUTION INTRAVENOUS; SUBCUTANEOUS at 20:52

## 2024-12-14 RX ADMIN — LISINOPRIL 10 MG: 10 TABLET ORAL at 08:59

## 2024-12-14 RX ADMIN — INSULIN LISPRO 4 UNITS: 100 INJECTION, SOLUTION INTRAVENOUS; SUBCUTANEOUS at 12:45

## 2024-12-14 RX ADMIN — METFORMIN HYDROCHLORIDE 500 MG: 500 TABLET, FILM COATED ORAL at 20:39

## 2024-12-14 RX ADMIN — ASPIRIN 81 MG: 81 TABLET, COATED ORAL at 08:59

## 2024-12-14 RX ADMIN — IPRATROPIUM BROMIDE AND ALBUTEROL SULFATE 3 ML: .5; 3 SOLUTION RESPIRATORY (INHALATION) at 14:35

## 2024-12-14 RX ADMIN — Medication 10 ML: at 09:00

## 2024-12-14 RX ADMIN — PANTOPRAZOLE SODIUM 40 MG: 40 TABLET, DELAYED RELEASE ORAL at 06:11

## 2024-12-14 RX ADMIN — LINAGLIPTIN 5 MG: 5 TABLET, FILM COATED ORAL at 08:59

## 2024-12-14 RX ADMIN — LEVOTHYROXINE SODIUM 25 MCG: 0.03 TABLET ORAL at 06:11

## 2024-12-14 RX ADMIN — AMPICILLIN SODIUM, SULBACTAM SODIUM 3 G: 2; 1 INJECTION, POWDER, FOR SOLUTION INTRAMUSCULAR; INTRAVENOUS at 08:59

## 2024-12-14 RX ADMIN — FAMOTIDINE 20 MG: 20 TABLET, FILM COATED ORAL at 20:39

## 2024-12-14 RX ADMIN — IPRATROPIUM BROMIDE AND ALBUTEROL SULFATE 3 ML: .5; 3 SOLUTION RESPIRATORY (INHALATION) at 23:25

## 2024-12-14 RX ADMIN — ENOXAPARIN SODIUM 40 MG: 100 INJECTION SUBCUTANEOUS at 09:00

## 2024-12-14 RX ADMIN — AMPICILLIN SODIUM, SULBACTAM SODIUM 3 G: 2; 1 INJECTION, POWDER, FOR SOLUTION INTRAMUSCULAR; INTRAVENOUS at 03:06

## 2024-12-14 RX ADMIN — Medication 10 ML: at 20:40

## 2024-12-14 RX ADMIN — FAMOTIDINE 20 MG: 20 TABLET, FILM COATED ORAL at 09:00

## 2024-12-14 RX ADMIN — METFORMIN HYDROCHLORIDE 500 MG: 500 TABLET, FILM COATED ORAL at 08:59

## 2024-12-14 NOTE — PLAN OF CARE
Goal Outcome Evaluation:  Plan of Care Reviewed With: patient, family        Progress: no change  Outcome Evaluation: See note                  Treatment Assessment (SLP): continued (12/14/24 3053)  Treatment Assessment Comments (SLP): Continue to follow (12/14/24 9743)  Plan for Continued Treatment (SLP): continue treatment per plan of care (12/14/24 0003)

## 2024-12-14 NOTE — THERAPY TREATMENT NOTE
Acute Care - Speech Language Pathology   Swallow Treatment Note [facility]     Patient Name: Richard NOLAN Jr.  : 1950  MRN: [id number]  Today's Date: 2024               Admit Date: 2024  Followed up with pt to assess diet tolerance. Pt reports no difficulty with soft foods and honey thick liquids. He completed trials of honey thick liquids via straw while SLP present. He had one delayed cough. He was noted to take large consecutive sips. SLP educated pt to take smaller, single sips. Also had pt practice using a chin tuck maneuver and multiple swallows to aide with residue clearance. Will continue soft solids and honey thick liquids at this time. Pt may need continued education of specific feeding and aspiration precautions. SLP will continue to follow.   Gideon Mcelroy CCC-SLP 2024 15:25 CST    Visit Dx:     ICD-10-CM ICD-9-CM   1. Pneumonia due to infectious organism, unspecified laterality, unspecified part of lung  J18.9 486   2. Hypoxia  R09.02 799.02   3. Dysphagia, unspecified type  R13.10 787.20     Patient Active Problem List   Diagnosis    Prostate cancer    Non-smoker    Urinary frequency    Aspirin long-term use    History of radiation therapy    Pharyngoesophageal dysphagia    Hx of colonic polyp    Family hx of colon cancer    Pneumonia     Past Medical History:   Diagnosis Date    Cancer     PROSTATE CANCER     Colon polyps     Cystitis     Depression     Diabetes mellitus     Enlarged prostate     Esophagitis     GERD (gastroesophageal reflux disease)     [place] (hard of hearing)     Hypertension     Kidney stone     OPCD (olivopontocerebellar degeneration)     PONV (postoperative nausea and vomiting)     Skin cancer     Varicose vein of leg      Past Surgical History:   Procedure Laterality Date    COLONOSCOPY N/A 2022    Procedure: COLONOSCOPY WITH ANESTHESIA;  Surgeon: Adair Beltran MD;  Location: [facility] ENDOSCOPY;  Service: Gastroenterology;  Laterality: N/A;   preop; hx of polyps  postop; polyps   PCP Marcelino Weaver     ENDOSCOPY N/A 9/19/2022    Procedure: ESOPHAGOGASTRODUODENOSCOPY WITH ANESTHESIA;  Surgeon: Adair Beltran MD;  Location:  [facility] ENDOSCOPY;  Service: Gastroenterology;  Laterality: N/A;  preop; dysphagia  postop; normal   PCP Marcelino Weaver     ENDOSCOPY AND COLONOSCOPY  11/13/2015    post gastric bypass, dilation, very small internal hemorroid    EYE SURGERY Bilateral     Cataract    GASTRIC BYPASS      NASAL SEPTUM SURGERY      NECK SURGERY      ORIF TIBIA/FIBULA FRACTURES Right     PROSTATE BIOPSY N/A 05/13/2021    Procedure: PROSTATE ULTRASOUND BIOPSY MRI FUSION WITH URONAV;  Surgeon: Gregorio Lange MD;  Location: [facility] OR;  Service: Urology;  Laterality: N/A;    VEIN LIGATION AND STRIPPING         SLP Recommendation and Plan                                               Daily Summary of Progress (SLP): progress towards functional goals is fair (12/14/24 1503)               Treatment Assessment (SLP): continued (12/14/24 1503)  Treatment Assessment Comments (SLP): Continue to follow (12/14/24 1503)  Plan for Continued Treatment (SLP): continue treatment per plan of care (12/14/24 1503)         Progress: no change  Outcome Evaluation: See note      SWALLOW EVALUATION (Last 72 Hours)       SLP Adult Swallow Evaluation       Row Name 12/14/24 1503 12/13/24 1010 12/13/24 0712             Rehab Evaluation    Document Type therapy note (daily note)  -MB re-evaluation  -MG evaluation  -BN      Subjective Information no complaints  -MB no complaints  -MG no complaints  -BN      Patient Observations alert;cooperative  -MB alert;cooperative;agree to therapy  -MG alert;cooperative  -BN      Patient/Family/Caregiver Comments/Observations Male and female family members present  -MB -- no family present  -BN      Patient Effort good  -MB good  -MG good  -BN      Symptoms Noted, Comment -- n  -MG --         General Information    Patient Profile Reviewed  yes  -MB yes  -MG yes  -BN      Pertinent History Of Current Problem -- Hypoxia and concern for aspiration due to reflux. History of muscular dystrophy, DM , HTN, GERD, prior endoscopy with esophageal dilations x2. Prior swallow study on 11/28/2023 with residue as main concern but remained on regular diet with thin liquids.  -MG acute pneumonia. Hx of muscular dystrophy, HTN, [place], DM2, GERD, prostate and skin CA.  -BN      Current Method of Nutrition -- thin liquids;soft to chew textures  -MG thin liquids;soft to chew textures  -BN      Precautions/Limitations, Vision -- WFL with corrective lenses  -MG WFL with corrective lenses  -BN      Precautions/Limitations, Hearing -- hearing aid, right;hearing aid, left  -MG hearing aid, right;hearing aid, left  -BN      Prior Level of Function-Communication -- WFL  -MG WFL  -BN      Prior Level of Function-Swallowing -- no diet consistency restrictions  -MG no diet consistency restrictions  -BN      Plans/Goals Discussed with -- patient  -MG patient  -BN      Barriers to Rehab -- medically complex  -MG none identified  -BN      Patient's Goals for Discharge -- --  reports he would consider a feeding tube if needed in the future  -MG patient did not state  -BN         Pain    Pretreatment Pain Rating -- -- 0/10 - no pain  -BN      Posttreatment Pain Rating -- -- 0/10 - no pain  -BN      Additional Documentation Pain Scale: FACES Pre/Post-Treatment (Group)  -MB Pain Scale: FACES Pre/Post-Treatment (Group)  -MG --         Pain Scale: FACES Pre/Post-Treatment    Pain: FACES Scale, Pretreatment 0-->no hurt  -MB 0-->no hurt  -MG --      Posttreatment Pain Rating -- 0-->no hurt  -MG --         Oral Motor Structure and Function    Dentition Assessment -- lower dentures/partial in place;upper dentures/partial in place  -MG lower dentures/partial in place;upper dentures/partial in place  -BN      Secretion Management -- WNL/WFL  -MG WNL/WFL  -BN      Mucosal Quality -- moist, healthy   -MG moist, healthy  -BN      Volitional Swallow -- weak  -MG --      Volitional Cough -- weak  -MG --         Oral Musculature and Cranial Nerve Assessment    Oral Motor General Assessment -- oral labial or buccal impairment  -MG oral labial or buccal impairment  -BN      Oral Labial or Buccal Impairment, Detail, Cranial Nerve VII (Facial): -- left labial droop  -MG left labial droop  -BN         General Eating/Swallowing Observations    Respiratory Support Currently in Use -- nasal cannula  -MG nasal cannula  -BN      Eating/Swallowing Skills -- -- fed by SLP  -      Positioning During Eating -- -- upright in bed  -BN      Utensils Used -- -- spoon;straw  -BN      Consistencies Trialed -- -- regular textures;pudding thick;honey-thick liquids;nectar/syrup-thick liquids;thin liquids  -         Clinical Swallow Eval    Oral Prep Phase -- -- WFL  -BN      Oral Transit -- -- WFL  -BN      Oral Residue -- -- WFL  -BN      Pharyngeal Phase -- -- suspected pharyngeal impairment  -BN      Esophageal Phase -- -- unremarkable  -      Clinical Swallow Evaluation Summary -- --  clinical bedside swallow evaluation completed. Pt admitted for acute pneumonia. Hx of muscular dystrophy, HTN, [place], DM2, GERD, prostate and skin CA. Per H&P, pt had instance of suspected aspiration. Pt reports aspiration did not occur during PO but while sleeping in reclinder secondary to reflux. Pt oral mech exam does reveal left sided labial droop. RN notified. Upper and lower dentures placed for PO trials as well as hearing aids bilaterally. Congested cough noted prior to any PO. Pt does have hx of dysphagia with prior MBS completed on 11/28/23 and outpt speech therapy services. Pt reports not currently recieving any speech services and no altered diet at this time. Pt repositioned upright for PO trials. Full range of consistencies presented except mech soft. Pt demo with overall timely swallow. Inconsistent multiple swallow x1 with all  trials. Consistent cough noted immediately following swallow initiation with both nectar thick trials and thin liquid trials. Adequate mastication and oral clearing of regular solids. ST recommends for pt to complete more objective testing with MBS this date to r/o aspiration or need for alternate diet. MBS scheduled for this date. Okay to continue on soft to chew diet and will change to honey thick liquids til study is completed. Meds whole in applesauce. General aspiration precautions. ST to follow.  -BN         Pharyngeal Phase Concerns    Pharyngeal Phase Concerns -- -- cough  -BN      Cough -- -- thin;nectar  -BN         MBS/VFSS    Utensils Used -- spoon;straw  -MG --      Consistencies Trialed -- honey-thick liquids;nectar/syrup-thick liquids;thin liquids;pureed;soft to chew textures;regular textures  -MG --         MBS/VFSS Interpretation    Oral Prep Phase -- impaired oral phase of swallowing  -MG --      Oral Transit Phase -- impaired  -MG --      Oral Residue -- impaired  -MG --      VFSS Summary -- See note  -MG --         Initiation of Pharyngeal Swallow    Initiation of Pharyngeal Swallow -- WFL  -MG --      Pharyngeal Phase -- impaired pharyngeal phase of swallowing  -MG --      Penetration During the Swallow -- nectar-thick liquids  -MG --      Penetration After the Swallow -- thin liquids;secondary to residue;in pyriform sinuses  -MG --      Aspiration After the Swallow -- thin liquids;secondary to residue;in pyriform sinuses  -MG --      Response to Penetration -- No  -MG --      No spontaneous response to penetration and -- non-effective laryngeal clearance with cue (see comments)  -MG --      Response to Aspiration -- Yes  -MG --      Responded to aspiration with -- cough;inconsistent  -MG --      Pharyngeal Residue -- base of tongue;valleculae;pyriform sinuses;posterior pharyngeal wall  -MG --      Response to Residue -- partial residue clearance  -MG --      Attempted Compensatory Maneuvers --  multiple swallows;chin tuck  -MG --      Response to Attempted Compensatory Maneuvers -- did not reduce residue  -MG --         Esophageal Phase    Esophageal Phase -- see radiology report for further details  -MG --         Swallowing Quality of Life Assessment    Psychosocial Eating History -- food important to aspect of social life  -MG --      Patient/family preferences -- --  avoid NPO status  -MG --      Education and counseling provided -- Signs of aspiration;Risks of aspiration;Safest diet options;Alternate nutrition/hydration options, risks, and benefits;Oral care recommendations and rationale;Aspiration precautions;Compensatory strategy recommendations and rationale  -MG --         SLP Evaluation Clinical Impression    SLP Swallowing Diagnosis -- suspect chronic;mod-severe;oral dysphagia;pharyngeal dysphagia  with likely esophageal component  -MG R/O pharyngeal dysphagia;suspected pharyngeal dysphagia  -BN      Functional Impact -- risk of aspiration/pneumonia;risk of malnutrition;risk of dehydration  -MG risk of aspiration/pneumonia  -BN      Rehab Potential/Prognosis, Swallowing -- adequate, monitor progress closely  -MG good, to achieve stated therapy goals  -BN      Swallow Criteria for Skilled Therapeutic Interventions Met -- no problems identified which require skilled intervention  -MG demonstrates skilled criteria  -BN         SLP Treatment Clinical Impressions    Treatment Assessment (SLP) continued  -MB -- --      Treatment Assessment Comments (SLP) Continue to follow  -MB -- --      Daily Summary of Progress (SLP) progress towards functional goals is fair  -MB -- --      Barriers to Overall Progress (SLP) Baseline deficits  -MB -- --      Plan for Continued Treatment (SLP) continue treatment per plan of care  -MB -- --      Care Plan Review -- -- evaluation/treatment results reviewed;care plan/treatment goals reviewed;risks/benefits reviewed;current/potential barriers reviewed;patient/other  agree to care plan  -BN      Care Plan Review, Other Participant(s) -- -- caregiver  -BN         Recommendations    Therapy Frequency (Swallow) -- at least;2 days per week  -MG at least;2 days per week  -BN      Predicted Duration Therapy Intervention (Days) -- until discharge  -MG until discharge  -BN      SLP Diet Recommendation -- soft to chew textures;chopped;nectar thick liquids;ice chips between meals after oral care, with supervision  vs discussion about NPO as safest and PEG tube  -MG soft to chew textures;honey thick liquids  -BN      Recommended Diagnostics -- -- VFSS ([facility])  -BN      Recommended Precautions and Strategies -- upright posture during/after eating;small bites of food and sips of liquid;multiple swallows per bite of food;multiple swallows per sip of liquid;general aspiration precautions;volitional throat clear;fatigue precautions;reflux precautions  -MG upright posture during/after eating;small bites of food and sips of liquid;general aspiration precautions  -BN      Oral Care Recommendations -- Oral Care before breakfast, after meals and PRN;Toothbrush  -MG Oral Care BID/PRN  -BN      SLP Rec. for Method of Medication Administration -- meds whole;meds crushed;with puree  -MG meds whole;with puree  -BN      Monitor for Signs of Aspiration -- yes;notify SLP if any concerns  -MG yes;notify SLP if any concerns  -BN      Anticipated Discharge Disposition (SLP) -- home with home health  -MG unknown  -BN         Swallow Goals (SLP)    Swallow LTGs -- Swallow Long Term Goal (free text)  -MG Swallow Long Term Goal (free text)  -BN      Swallow STGs -- diet tolerance goal selection (SLP);swallow management recall goal selection (SLP)  -MG diet tolerance goal selection (SLP)  -BN      Diet Tolerance Goal Selection (SLP) -- Patient will tolerate trials of  -MG Patient will tolerate trials of  -BN      Swallow Management Recall Goal Selection (SLP) -- swallow management recall, SLP goal 1  -MG --          (LTG) Swallow    (LTG) Swallow Pt will tolerate LRD without s/s of aspiration.  -MB Pt will tolerate LRD without s/s of aspiration.  -MG Pt will tolerate LRD without s/s of aspiration.  -BN      [place] (Swallow Long Term Goal) independently (over 90% accuracy)  -MB independently (over 90% accuracy)  -MG independently (over 90% accuracy)  -BN      Time Frame (Swallow Long Term Goal) by discharge  -MB by discharge  -MG by discharge  -BN      Barriers (Swallow Long Term Goal) n/a  -MB n/a  -MG n/a  -BN      Progress/Outcomes (Swallow Long Term Goal) continuing progress toward goal  -MB new goal  -MG new goal  -BN         (STG) Patient will tolerate trials of    Consistencies Trialed (Tolerate trials) regular textures;soft to chew (ground) textures;thin liquids;nectar/ mildly thick liquids  -MB regular textures;soft to chew (ground) textures;thin liquids;nectar/ mildly thick liquids  -MG regular textures;soft to chew (ground) textures;thin liquids;nectar/ mildly thick liquids;honey/ moderately thick liquids  -BN      Desired Outcome (Tolerate trials) without signs/symptoms of aspiration  -MB without signs/symptoms of aspiration  -MG without signs/symptoms of aspiration  -BN      [place] (Tolerate trials) independently (over 90% accuracy)  -MB independently (over 90% accuracy)  -MG independently (over 90% accuracy)  -BN      Time Frame (Tolerate trials) by discharge  -MB by discharge  -MG by discharge  -BN      Progress/Outcomes (Tolerate trials) continuing progress toward goal  -MB new goal  -MG new goal  -BN         (STG) Swallow Management Recall Goal 1 (SLP)    Activity (Swallow Management Recall Goal 1, SLP) independent recall of;aspiration precautions;reflux precautions;fatigue precautions;oral care recommendations;compensatory swallow strategies/techniques;postural techniques;rationale for use of strategies/techniques  -MB independent recall of;aspiration precautions;reflux precautions;fatigue  precautions;oral care recommendations;compensatory swallow strategies/techniques;postural techniques;rationale for use of strategies/techniques  extra swallows and volitional throat clear  -MG --      [place]/Accuracy (Swallow Management Recall Goal 1, SLP) independently (over 90% accuracy)  -MB independently (over 90% accuracy)  -MG --      Time Frame (Swallow Management Recall Goal 1, SLP) by discharge  -MB by discharge  -MG --      Barriers (Swallow Management Recall Goal 1, SLP) none  -MB none  -MG --      Progress/Outcomes (Swallow Management Recall Goal 1, SLP) continuing progress toward goal  -MB new goal  -MG --                User Key  (r) = Recorded By, (t) = Taken By, (c) = Cosigned By      Initials Name Effective Dates    Gideon Das, CCC-SLP 02/03/23 -     BN Galilea Srivastava, MS-CCC/SLP, CNT 07/11/23 -     MG Ruby Cruz, MS CCC-SLP 07/11/23 -                     EDUCATION  The patient has been educated in the following areas:   Dysphagia (Swallowing Impairment).        SLP GOALS       Row Name 12/14/24 1503 12/13/24 1010 12/13/24 0712       (LTG) Swallow    (LTG) Swallow Pt will tolerate LRD without s/s of aspiration.  -MB Pt will tolerate LRD without s/s of aspiration.  -MG Pt will tolerate LRD without s/s of aspiration.  -BN    [place] (Swallow Long Term Goal) independently (over 90% accuracy)  -MB independently (over 90% accuracy)  -MG independently (over 90% accuracy)  -BN    Time Frame (Swallow Long Term Goal) by discharge  -MB by discharge  -MG by discharge  -BN    Barriers (Swallow Long Term Goal) n/a  -MB n/a  -MG n/a  -BN    Progress/Outcomes (Swallow Long Term Goal) continuing progress toward goal  -MB new goal  -MG new goal  -BN       (STG) Patient will tolerate trials of    Consistencies Trialed (Tolerate trials) regular textures;soft to chew (ground) textures;thin liquids;nectar/ mildly thick liquids  -MB regular textures;soft to chew (ground)  textures;thin liquids;nectar/ mildly thick liquids  -MG regular textures;soft to chew (ground) textures;thin liquids;nectar/ mildly thick liquids;honey/ moderately thick liquids  -BN    Desired Outcome (Tolerate trials) without signs/symptoms of aspiration  -MB without signs/symptoms of aspiration  -MG without signs/symptoms of aspiration  -BN    [place] (Tolerate trials) independently (over 90% accuracy)  -MB independently (over 90% accuracy)  -MG independently (over 90% accuracy)  -BN    Time Frame (Tolerate trials) by discharge  -MB by discharge  -MG by discharge  -BN    Progress/Outcomes (Tolerate trials) continuing progress toward goal  -MB new goal  -MG new goal  -BN       (STG) Swallow Management Recall Goal 1 (SLP)    Activity (Swallow Management Recall Goal 1, SLP) independent recall of;aspiration precautions;reflux precautions;fatigue precautions;oral care recommendations;compensatory swallow strategies/techniques;postural techniques;rationale for use of strategies/techniques  -MB independent recall of;aspiration precautions;reflux precautions;fatigue precautions;oral care recommendations;compensatory swallow strategies/techniques;postural techniques;rationale for use of strategies/techniques  extra swallows and volitional throat clear  -MG --    [place]/Accuracy (Swallow Management Recall Goal 1, SLP) independently (over 90% accuracy)  -MB independently (over 90% accuracy)  -MG --    Time Frame (Swallow Management Recall Goal 1, SLP) by discharge  -MB by discharge  -MG --    Barriers (Swallow Management Recall Goal 1, SLP) none  -MB none  -MG --    Progress/Outcomes (Swallow Management Recall Goal 1, SLP) continuing progress toward goal  -MB new goal  -MG --              User Key  (r) = Recorded By, (t) = Taken By, (c) = Cosigned By      Initials Name Provider Type    Gideon Das, CCC-SLP Speech and Language Pathologist    Galilea Garcia MS-CCC/SLP, CNT Speech and Language  Pathologist    Ruby Humphreys, MS CCC-SLP Speech and Language Pathologist                         Time Calculation:    Time Calculation- SLP       Row Name 12/14/24 1524             Time Calculation- SLP    SLP Start Time 1503  -MB      SLP Stop Time 1524  -MB      SLP Time Calculation (min) 21 min  -MB      SLP Received On 12/14/24  -MB         Untimed Charges    88699-MB Treatment Swallow Minutes 21  -MB         Total Minutes    Untimed Charges Total Minutes 21  -MB       Total Minutes 21  -MB                User Key  (r) = Recorded By, (t) = Taken By, (c) = Cosigned By      Initials Name Provider Type    Gideon Das CCC-SLP Speech and Language Pathologist                    Therapy Charges for Today       Code Description Service Date Service Provider Modifiers Qty    [id number] HC ST TREATMENT SWALLOW 1 12/14/2024 Gideon Mcelroy CCC-SLP GN 1                 JUSTIN Millard  12/14/2024

## 2024-12-14 NOTE — PLAN OF CARE
Goal Outcome Evaluation:  Plan of Care Reviewed With: patient        Progress: improving  Outcome Evaluation: Pt A/Ox4. New IV started in right wrist by Charge Nurse Maryanne. IV abx given as scheduled. No c/o pain thus far. Safety maintained. Call light in reach.

## 2024-12-14 NOTE — PROGRESS NOTES
Daily Progress Note  Richard NOLAN Jr.  MRN: [id number] LOS: 2    Admit Date: 12/12/2024 12/14/2024 11:25 CST    Subjective:      Chief Complaint:  Chief Complaint   Patient presents with    Cough    Aspiration    Shortness of Breath       Interval History:    Reviewed overnight events and nursing notes.   No new events overnight still coughing and congested.    Review of Systems   Constitutional:  Positive for fatigue.   HENT:  Positive for congestion.    Respiratory:  Positive for cough and shortness of breath.    Cardiovascular: Negative.    Gastrointestinal: Negative.    Genitourinary: Negative.    Musculoskeletal: Negative.    Neurological:  Positive for weakness.       DIET:  Diet: Diabetic; Consistent Carbohydrate; Texture: Soft to Chew (NDD 3); Soft to Chew: Ground Meat; Fluid Consistency: Honey Thick    Medications:      ampicillin-sulbactam, 3 g, Intravenous, Q6H  aspirin, 81 mg, Oral, Daily  empagliflozin, 25 mg, Oral, Daily  enoxaparin, 40 mg, Subcutaneous, Daily  famotidine, 20 mg, Oral, BID  FLUoxetine, 20 mg, Oral, Daily  insulin lispro, 2-9 Units, Subcutaneous, 4x Daily AC & at Bedtime  levothyroxine, 25 mcg, Oral, Q AM  linagliptin, 5 mg, Oral, Daily   And  metFORMIN, 500 mg, Oral, BID  lisinopril, 10 mg, Oral, Daily  methylPREDNISolone sodium succinate, 40 mg, Intravenous, Daily  pantoprazole, 40 mg, Oral, Q AM  sodium chloride, 10 mL, Intravenous, Q12H        Data:     Code Status:   Code Status and Medical Interventions: CPR (Attempt to Resuscitate); Full Support   Ordered at: 12/13/24 0037     Level Of Support Discussed With:    Patient     Code Status (Patient has no pulse and is not breathing):    CPR (Attempt to Resuscitate)     Medical Interventions (Patient has pulse or is breathing):    Full Support       Family History   Problem Relation Age of Onset    Colon polyps Mother     Colon polyps Brother     Colon cancer Neg Hx      Social History     Socioeconomic History    Marital status:     Tobacco Use    Smoking status: Never    Smokeless tobacco: Never   Vaping Use    Vaping status: Never Used   Substance and Sexual Activity    Alcohol use: Yes     Comment: occ    Drug use: Never    Sexual activity: Defer       Labs:    Lab Results (last 72 hours)       Procedure Component Value Units Date/Time    POC Glucose Once [[id number]]  (Abnormal) Collected: 12/14/24 0815    Specimen: Blood Updated: 12/14/24 0828     Glucose 149 mg/dL      Comment: : [id number] Jace Barriga (Rosa)Meter ID: [id number]       Basic Metabolic Panel [[id number]]  (Abnormal) Collected: 12/14/24 0747    Specimen: Blood Updated: 12/14/24 0818     Glucose 175 mg/dL      BUN 17 mg/dL      Creatinine 0.45 mg/dL      Sodium 138 mmol/L      Potassium 4.4 mmol/L      Chloride 100 mmol/L      CO2 25.0 mmol/L      Calcium 9.0 mg/dL      BUN/Creatinine Ratio 37.8     Anion Gap 13.0 mmol/L      eGFR 111.2 mL/min/1.73     Narrative:      GFR Categories in Chronic Kidney Disease (CKD)      GFR Category          GFR (mL/min/1.73)    Interpretation  G1                     90 or greater         Normal or high (1)  G2                      60-89                Mild decrease (1)  G3a                   45-59                Mild to moderate decrease  G3b                   30-44                Moderate to severe decrease  G4                    15-29                Severe decrease  G5                    14 or less           Kidney failure          (1)In the absence of evidence of kidney disease, neither GFR category G1 or G2 fulfill the criteria for CKD.    eGFR calculation 2021 CKD-EPI creatinine equation, which does not include race as a factor    CBC (No Diff) [[id number]]  (Normal) Collected: 12/14/24 0747    Specimen: Blood Updated: 12/14/24 0759     WBC 9.47 10*3/mm3      RBC 4.79 10*6/mm3      Hemoglobin 13.4 g/dL      Hematocrit 42.4 %      MCV 88.5 fL      MCH 28.0 pg      MCHC 31.6 g/dL      RDW 14.2 %      RDW-SD 45.3 fl      MPV 9.2  fL      Platelets 309 10*3/mm3     POC Glucose Once [[id number]]  (Abnormal) Collected: 12/13/24 2128    Specimen: Blood Updated: 12/13/24 2139     Glucose 226 mg/dL      Comment: : [id number] Elliott HeatherMeter ID: [id number]       POC Glucose Once [[id number]]  (Abnormal) Collected: 12/13/24 1629    Specimen: Blood Updated: 12/13/24 1642     Glucose 151 mg/dL      Comment: : [id number] Los BreehaMeter ID: [id number]       POC Glucose Once [[id number]]  (Abnormal) Collected: 12/13/24 1125    Specimen: Blood Updated: 12/13/24 1150     Glucose 229 mg/dL      Comment: : [id number] Los BreehaMeter ID: [id number]       POC Glucose Once [[id number]]  (Normal) Collected: 12/13/24 0735    Specimen: Blood Updated: 12/13/24 0747     Glucose 87 mg/dL      Comment: : [id number] Phillip OseiiaMeter ID: [id number]       Comprehensive Metabolic Panel [[id number]]  (Abnormal) Collected: 12/13/24 0439    Specimen: Blood Updated: 12/13/24 0512     Glucose 97 mg/dL      BUN 15 mg/dL      Creatinine 0.37 mg/dL      Sodium 139 mmol/L      Potassium 4.0 mmol/L      Chloride 103 mmol/L      CO2 24.0 mmol/L      Calcium 9.1 mg/dL      Total Protein 7.1 g/dL      Albumin 3.7 g/dL      ALT (SGPT) 46 U/L      AST (SGOT) 83 U/L      Alkaline Phosphatase 84 U/L      Total Bilirubin 0.4 mg/dL      Globulin 3.4 gm/dL      A/G Ratio 1.1 g/dL      BUN/Creatinine Ratio 40.5     Anion Gap 12.0 mmol/L      eGFR 118.0 mL/min/1.73     Narrative:      GFR Categories in Chronic Kidney Disease (CKD)      GFR Category          GFR (mL/min/1.73)    Interpretation  G1                     90 or greater         Normal or high (1)  G2                      60-89                Mild decrease (1)  G3a                   45-59                Mild to moderate decrease  G3b                   30-44                Moderate to severe decrease  G4                    15-29                Severe decrease  G5                    14 or less           Kidney  failure          (1)In the absence of evidence of kidney disease, neither GFR category G1 or G2 fulfill the criteria for CKD.    eGFR calculation 2021 CKD-EPI creatinine equation, which does not include race as a factor    CBC (No Diff) [[id number]]  (Normal) Collected: 12/13/24 0439    Specimen: Blood Updated: 12/13/24 0451     WBC 8.52 10*3/mm3      RBC 4.85 10*6/mm3      Hemoglobin 13.6 g/dL      Hematocrit 42.7 %      MCV 88.0 fL      MCH 28.0 pg      MCHC 31.9 g/dL      RDW 14.3 %      RDW-SD 46.0 fl      MPV 9.2 fL      Platelets 298 10*3/mm3     POC Glucose Once [[id number]]  (Normal) Collected: 12/13/24 0125    Specimen: Blood Updated: 12/13/24 0136     Glucose 82 mg/dL      Comment: : [id number] Saulsbury CourtneyMeter ID: [id number]       High Sensitivity Troponin T 1Hr [[id number]] Collected: 12/12/24 2035    Specimen: Blood Updated: 12/12/24 2132     HS Troponin T 21 ng/L      Troponin T Delta -7 ng/L      Troponin T % Change -25 %     Narrative:      High Sensitive Troponin T Reference Range:  <14.0 ng/L- Negative Female for AMI  <22.0 ng/L- Negative Male for AMI  >=14 - Abnormal Female indicating possible myocardial injury.  >=22 - Abnormal Male indicating possible myocardial injury.   Clinicians would have to utilize clinical acumen, EKG, Troponin, and serial changes to determine if it is an Acute Myocardial Infarction or myocardial injury due to an underlying chronic condition.         Respiratory Panel PCR w/COVID-19(SARS-CoV-2) GARRISON/ROGELIO/JONATHON/PAD/COR/ERNIE In-House, NP Swab in UTM/VTM, 2 HR TAT - Swab, Nasopharynx [[id number]]  (Normal) Collected: 12/12/24 1927    Specimen: Swab from Nasopharynx Updated: 12/12/24 2038     ADENOVIRUS, PCR Not Detected     Coronavirus 229E Not Detected     Coronavirus HKU1 Not Detected     Coronavirus NL63 Not Detected     Coronavirus OC43 Not Detected     COVID19 Not Detected     Human Metapneumovirus Not Detected     Human Rhinovirus/Enterovirus Not Detected     Influenza A  "PCR Not Detected     Influenza B PCR Not Detected     Parainfluenza Virus 1 Not Detected     Parainfluenza Virus 2 Not Detected     Parainfluenza Virus 3 Not Detected     Parainfluenza Virus 4 Not Detected     RSV, PCR Not Detected     Bordetella pertussis pcr Not Detected     Bordetella parapertussis PCR Not Detected     Chlamydophila pneumoniae PCR Not Detected     Mycoplasma pneumo by PCR Not Detected    Narrative:      In the setting of a positive respiratory panel with a viral infection PLUS a negative procalcitonin without other underlying concern for bacterial infection, consider observing off antibiotics or discontinuation of antibiotics and continue supportive care. If the respiratory panel is positive for atypical bacterial infection (Bordetella pertussis, Chlamydophila pneumoniae, or Mycoplasma pneumoniae), consider antibiotic de-escalation to target atypical bacterial infection.    D-dimer, Quantitative [[id number]]  (Abnormal) Collected: 12/12/24 1926    Specimen: Blood Updated: 12/12/24 2018     D-Dimer, Quantitative 0.79 MCGFEU/mL     Narrative:      According to the assay 's published package insert, a normal (<0.50 MCGFEU/mL) D-dimer result in conjunction with a non-high clinical probability assessment, excludes deep vein thrombosis (DVT) and pulmonary embolism (PE) with high sensitivity.    D-dimer values increase with age and this can make VTE exclusion of an older population difficult. To address this, the [employer], based on best available evidence and recent guidelines, recommends that clinicians use age-adjusted D-dimer thresholds in patients greater than 50 years of age with: a) a low probability of PE who do not meet all Pulmonary Embolism Rule Out Criteria, or b) in those with intermediate probability of PE.   The formula for an age-adjusted D-dimer cut-off is \"age/100\".  For example, a 60 year old patient would have an age-adjusted cut-off of 0.60 MCGFEU/mL " and an 80 year old 0.80 MCGFEU/mL.    Comprehensive Metabolic Panel [[id number]]  (Abnormal) Collected: 12/12/24 1926    Specimen: Blood Updated: 12/12/24 2008     Glucose 125 mg/dL      BUN 18 mg/dL      Creatinine 0.55 mg/dL      Sodium 139 mmol/L      Potassium 4.6 mmol/L      Chloride 101 mmol/L      CO2 24.0 mmol/L      Calcium 9.6 mg/dL      Total Protein 8.1 g/dL      Albumin 4.2 g/dL      ALT (SGPT) 58 U/L      AST (SGOT) 106 U/L      Alkaline Phosphatase 99 U/L      Total Bilirubin 0.4 mg/dL      Globulin 3.9 gm/dL      A/G Ratio 1.1 g/dL      BUN/Creatinine Ratio 32.7     Anion Gap 14.0 mmol/L      eGFR 104.6 mL/min/1.73     Narrative:      GFR Categories in Chronic Kidney Disease (CKD)      GFR Category          GFR (mL/min/1.73)    Interpretation  G1                     90 or greater         Normal or high (1)  G2                      60-89                Mild decrease (1)  G3a                   45-59                Mild to moderate decrease  G3b                   30-44                Moderate to severe decrease  G4                    15-29                Severe decrease  G5                    14 or less           Kidney failure          (1)In the absence of evidence of kidney disease, neither GFR category G1 or G2 fulfill the criteria for CKD.    eGFR calculation 2021 CKD-EPI creatinine equation, which does not include race as a factor    BNP [[id number]]  (Normal) Collected: 12/12/24 1926    Specimen: Blood Updated: 12/12/24 2005     proBNP 325.8 pg/mL     Narrative:      This assay is used as an aid in the diagnosis of individuals suspected of having heart failure. It can be used as an aid in the diagnosis of acute decompensated heart failure (ADHF) in patients presenting with signs and symptoms of ADHF to the emergency department (ED). In addition, NT-proBNP of <300 pg/mL indicates ADHF is not likely.    Age Range Result Interpretation  NT-proBNP Concentration (pg/mL:      <50             Positive             >450                   Gray                 300-450                    Negative             <300    50-75           Positive            >900                  Gray                300-900                  Negative            <300      >75             Positive            >1800                  Gray                300-1800                  Negative            <300    High Sensitivity Troponin T [[id number]]  (Abnormal) Collected: 12/12/24 1926    Specimen: Blood Updated: 12/12/24 2004     HS Troponin T 28 ng/L     Narrative:      High Sensitive Troponin T Reference Range:  <14.0 ng/L- Negative Female for AMI  <22.0 ng/L- Negative Male for AMI  >=14 - Abnormal Female indicating possible myocardial injury.  >=22 - Abnormal Male indicating possible myocardial injury.   Clinicians would have to utilize clinical acumen, EKG, Troponin, and serial changes to determine if it is an Acute Myocardial Infarction or myocardial injury due to an underlying chronic condition.         CBC & Differential [[id number]]  (Abnormal) Collected: 12/12/24 1926    Specimen: Blood Updated: 12/12/24 1947    Narrative:      The following orders were created for panel order CBC & Differential.  Procedure                               Abnormality         Status                     ---------                               -----------         ------                     CBC Auto Differential[[id number]]        Abnormal            Final result                 Please view results for these tests on the individual orders.    CBC Auto Differential [[id number]]  (Abnormal) Collected: 12/12/24 1926    Specimen: Blood Updated: 12/12/24 1947     WBC 7.90 10*3/mm3      RBC 5.24 10*6/mm3      Hemoglobin 14.7 g/dL      Hematocrit 47.3 %      MCV 90.3 fL      MCH 28.1 pg      MCHC 31.1 g/dL      RDW 14.1 %      RDW-SD 46.6 fl      MPV 9.4 fL      Platelets 324 10*3/mm3      Neutrophil % 71.1 %      Lymphocyte % 14.6 %      Monocyte % 11.3 %      Eosinophil % 2.2 %       Basophil % 0.5 %      Immature Grans % 0.3 %      Neutrophils, Absolute 5.63 10*3/mm3      Lymphocytes, Absolute 1.15 10*3/mm3      Monocytes, Absolute 0.89 10*3/mm3      Eosinophils, Absolute 0.17 10*3/mm3      Basophils, Absolute 0.04 10*3/mm3      Immature Grans, Absolute 0.02 10*3/mm3      nRBC 0.0 /100 WBC     [place] Draw [[id number]] Collected: 12/12/24 1926    Specimen: Blood Updated: 12/12/24 1945    Narrative:      The following orders were created for panel order [place] Draw.  Procedure                               Abnormality         Status                     ---------                               -----------         ------                     Green Top (Gel)[[id number]]                                  Final result               Lavender Top[[id number]]                                     Final result               Red Top[[id number]]                                          Final result               Gray Top[[id number]]                                         Final result               Light Blue Top[[id number]]                                   Final result                 Please view results for these tests on the individual orders.    Green Top (Gel) [[id number]] Collected: 12/12/24 1926    Specimen: Blood Updated: 12/12/24 1945     Extra Tube Hold for add-ons.     Comment: Auto resulted.       Lavender Top [[id number]] Collected: 12/12/24 1926    Specimen: Blood Updated: 12/12/24 1945     Extra Tube hold for add-on     Comment: Auto resulted       Red Top [[id number]] Collected: 12/12/24 1926    Specimen: Blood Updated: 12/12/24 1945     Extra Tube Hold for add-ons.     Comment: Auto resulted.       Gray Top [[id number]] Collected: 12/12/24 1926    Specimen: Blood Updated: 12/12/24 1945     Extra Tube Hold for add-ons.     Comment: Auto resulted.       Light Blue Top [[id number]] Collected: 12/12/24 1926    Specimen: Blood Updated: 12/12/24 1945     Extra Tube Hold for add-ons.     Comment: Auto  "resulted       POC Glucose Once [[id number]]  (Normal) Collected: 24    Specimen: Blood Updated: 24     Glucose 123 mg/dL      Comment: : [id number] CoeMeter ID: [id number]                 Objective:     Vitals: /70 (BP Location: Right arm, Patient Position: Sitting)   Pulse 94   Temp 98.6 °F (37 °C) (Oral)   Resp 18   Ht 177.8 cm (70\")   Wt 92.9 kg (204 lb 12.8 oz)   SpO2 92%   BMI 29.39 kg/m²    Intake/Output Summary (Last 24 hours) at 2024 1125  Last data filed at 2024 2345  Gross per 24 hour   Intake 440 ml   Output 250 ml   Net 190 ml    Temp (24hrs), Av °F (36.7 °C), Min:97.5 °F (36.4 °C), Max:98.6 °F (37 °C)      Physical Exam  Vitals and nursing note reviewed.   Constitutional:       Appearance: Normal appearance.   HENT:      Head: Normocephalic and atraumatic.   Eyes:      Pupils: Pupils are equal, round, and reactive to light.   Cardiovascular:      Rate and Rhythm: Normal rate and regular rhythm.      Pulses: Normal pulses.   Pulmonary:      Effort: Respiratory distress present.      Breath sounds: Rales present.   Abdominal:      General: Abdomen is flat. Bowel sounds are normal.      Palpations: Abdomen is soft.   Musculoskeletal:         General: Normal range of motion.   Skin:     General: Skin is warm.      Capillary Refill: Capillary refill takes less than 2 seconds.   Neurological:      Mental Status: He is alert.             Assessment and Plan:     Primary Problem:  Pneumonia--aspiration  Dysphagia  Type 2 diabetes mellitus    Hospital Problem list:    Pneumonia      PMH:  Past Medical History:   Diagnosis Date    Cancer     PROSTATE CANCER     Colon polyps     Cystitis     Depression     Diabetes mellitus     Enlarged prostate     Esophagitis     GERD (gastroesophageal reflux disease)     [place] (hard of hearing)     Hypertension     Kidney stone     OPCD (olivopontocerebellar degeneration)     PONV (postoperative nausea and vomiting)  "    Skin cancer     Varicose vein of leg        Treatment Plan:  Aspiration pneumonia/pneumonitis  Had aspiration event on Monday, now has some radiographic findings of pneumonia.  Will start unasyn, nebs, and steroids.  Hopefully can improve over the weekend and discharge early next week     Dysphagia  Related to underlying neurodegenerative disease, will have speech follow     Type 2 Diabetes  Will continue lantus at 10 units plus sliding scale in addition to home trajenta, metformin and jardiance    Disposition: Home    Reviewed treatment plans with the patient and/or family.   30 minutes spent in face to face interaction and coordination of care.     Electronically signed by Marcelino Castillo MD on 12/14/2024 at 11:25 CST

## 2024-12-14 NOTE — PLAN OF CARE
Goal Outcome Evaluation: No complaints of pain this shift. Physical therapy has worked with patient this shift. Breathing treatments started this shift. Accuchecks ac and hs with sliding scale coverage as needed. Patient safety to be maintained this shift, continue to monitor and report abnormal to provider.

## 2024-12-14 NOTE — PLAN OF CARE
Goal Outcome Evaluation:         Patient resting in bed, VSS, 02 @ 2L per NC, cont pulse ox, Wife at bedside. IV antibiotics admin as ordered. Patient on honey thick liquids and soft chew diet. Patient had swallow study done today. Voids per urinal. Up with stand by assist. PRN duo neb tx for SOB. All safety maintained and call light in reach.

## 2024-12-15 LAB
ANION GAP SERPL CALCULATED.3IONS-SCNC: 13 MMOL/L (ref 5–15)
BUN SERPL-MCNC: 20 MG/DL (ref 8–23)
BUN/CREAT SERPL: 39.2 (ref 7–25)
CALCIUM SPEC-SCNC: 9.1 MG/DL (ref 8.6–10.5)
CHLORIDE SERPL-SCNC: 101 MMOL/L (ref 98–107)
CO2 SERPL-SCNC: 26 MMOL/L (ref 22–29)
CREAT SERPL-MCNC: 0.51 MG/DL (ref 0.76–1.27)
DEPRECATED RDW RBC AUTO: 47.3 FL (ref 37–54)
EGFRCR SERPLBLD CKD-EPI 2021: 107.1 ML/MIN/1.73
ERYTHROCYTE [DISTWIDTH] IN BLOOD BY AUTOMATED COUNT: 14.5 % (ref 12.3–15.4)
GLUCOSE BLDC GLUCOMTR-MCNC: 222 MG/DL (ref 70–130)
GLUCOSE BLDC GLUCOMTR-MCNC: 224 MG/DL (ref 70–130)
GLUCOSE BLDC GLUCOMTR-MCNC: 273 MG/DL (ref 70–130)
GLUCOSE BLDC GLUCOMTR-MCNC: 339 MG/DL (ref 70–130)
GLUCOSE SERPL-MCNC: 197 MG/DL (ref 65–99)
HCT VFR BLD AUTO: 42.6 % (ref 37.5–51)
HGB BLD-MCNC: 13.4 G/DL (ref 13–17.7)
MCH RBC QN AUTO: 28.3 PG (ref 26.6–33)
MCHC RBC AUTO-ENTMCNC: 31.5 G/DL (ref 31.5–35.7)
MCV RBC AUTO: 90.1 FL (ref 79–97)
PLATELET # BLD AUTO: 324 10*3/MM3 (ref 140–450)
PMV BLD AUTO: 9.2 FL (ref 6–12)
POTASSIUM SERPL-SCNC: 4.6 MMOL/L (ref 3.5–5.2)
QT INTERVAL: 346 MS
QTC INTERVAL: 434 MS
RBC # BLD AUTO: 4.73 10*6/MM3 (ref 4.14–5.8)
SODIUM SERPL-SCNC: 140 MMOL/L (ref 136–145)
WBC NRBC COR # BLD AUTO: 9.25 10*3/MM3 (ref 3.4–10.8)

## 2024-12-15 PROCEDURE — 63710000001 INSULIN GLARGINE PER 5 UNITS: Performed by: FAMILY MEDICINE

## 2024-12-15 PROCEDURE — 25010000002 METHYLPREDNISOLONE PER 40 MG: Performed by: STUDENT IN AN ORGANIZED HEALTH CARE EDUCATION/TRAINING PROGRAM

## 2024-12-15 PROCEDURE — 80048 BASIC METABOLIC PNL TOTAL CA: CPT | Performed by: STUDENT IN AN ORGANIZED HEALTH CARE EDUCATION/TRAINING PROGRAM

## 2024-12-15 PROCEDURE — 63710000001 INSULIN LISPRO (HUMAN) PER 5 UNITS

## 2024-12-15 PROCEDURE — 87070 CULTURE OTHR SPECIMN AEROBIC: CPT | Performed by: STUDENT IN AN ORGANIZED HEALTH CARE EDUCATION/TRAINING PROGRAM

## 2024-12-15 PROCEDURE — 94799 UNLISTED PULMONARY SVC/PX: CPT

## 2024-12-15 PROCEDURE — 25010000002 AMPICILLIN-SULBACTAM PER 1.5 G: Performed by: STUDENT IN AN ORGANIZED HEALTH CARE EDUCATION/TRAINING PROGRAM

## 2024-12-15 PROCEDURE — 94761 N-INVAS EAR/PLS OXIMETRY MLT: CPT

## 2024-12-15 PROCEDURE — 87205 SMEAR GRAM STAIN: CPT | Performed by: STUDENT IN AN ORGANIZED HEALTH CARE EDUCATION/TRAINING PROGRAM

## 2024-12-15 PROCEDURE — 25010000002 ENOXAPARIN PER 10 MG: Performed by: STUDENT IN AN ORGANIZED HEALTH CARE EDUCATION/TRAINING PROGRAM

## 2024-12-15 PROCEDURE — 82948 REAGENT STRIP/BLOOD GLUCOSE: CPT

## 2024-12-15 PROCEDURE — 85027 COMPLETE CBC AUTOMATED: CPT | Performed by: STUDENT IN AN ORGANIZED HEALTH CARE EDUCATION/TRAINING PROGRAM

## 2024-12-15 RX ORDER — IPRATROPIUM BROMIDE AND ALBUTEROL SULFATE 2.5; .5 MG/3ML; MG/3ML
3 SOLUTION RESPIRATORY (INHALATION)
Status: DISCONTINUED | OUTPATIENT
Start: 2024-12-15 | End: 2024-12-17 | Stop reason: HOSPADM

## 2024-12-15 RX ADMIN — INSULIN LISPRO 6 UNITS: 100 INJECTION, SOLUTION INTRAVENOUS; SUBCUTANEOUS at 12:41

## 2024-12-15 RX ADMIN — AMPICILLIN SODIUM, SULBACTAM SODIUM 3 G: 2; 1 INJECTION, POWDER, FOR SOLUTION INTRAMUSCULAR; INTRAVENOUS at 15:07

## 2024-12-15 RX ADMIN — LINAGLIPTIN 5 MG: 5 TABLET, FILM COATED ORAL at 08:44

## 2024-12-15 RX ADMIN — LISINOPRIL 10 MG: 10 TABLET ORAL at 08:44

## 2024-12-15 RX ADMIN — INSULIN GLARGINE 10 UNITS: 100 INJECTION, SOLUTION SUBCUTANEOUS at 20:12

## 2024-12-15 RX ADMIN — AMPICILLIN SODIUM, SULBACTAM SODIUM 3 G: 2; 1 INJECTION, POWDER, FOR SOLUTION INTRAMUSCULAR; INTRAVENOUS at 08:45

## 2024-12-15 RX ADMIN — AMPICILLIN SODIUM, SULBACTAM SODIUM 3 G: 2; 1 INJECTION, POWDER, FOR SOLUTION INTRAMUSCULAR; INTRAVENOUS at 20:02

## 2024-12-15 RX ADMIN — METFORMIN HYDROCHLORIDE 500 MG: 500 TABLET, FILM COATED ORAL at 08:44

## 2024-12-15 RX ADMIN — LEVOTHYROXINE SODIUM 25 MCG: 0.03 TABLET ORAL at 06:32

## 2024-12-15 RX ADMIN — EMPAGLIFLOZIN 25 MG: 25 TABLET, FILM COATED ORAL at 08:44

## 2024-12-15 RX ADMIN — IPRATROPIUM BROMIDE AND ALBUTEROL SULFATE 3 ML: 2.5; .5 SOLUTION RESPIRATORY (INHALATION) at 06:00

## 2024-12-15 RX ADMIN — IPRATROPIUM BROMIDE AND ALBUTEROL SULFATE 3 ML: 2.5; .5 SOLUTION RESPIRATORY (INHALATION) at 11:00

## 2024-12-15 RX ADMIN — Medication 10 ML: at 20:02

## 2024-12-15 RX ADMIN — IPRATROPIUM BROMIDE AND ALBUTEROL SULFATE 3 ML: .5; 3 SOLUTION RESPIRATORY (INHALATION) at 03:18

## 2024-12-15 RX ADMIN — ASPIRIN 81 MG: 81 TABLET, COATED ORAL at 08:43

## 2024-12-15 RX ADMIN — ENOXAPARIN SODIUM 40 MG: 100 INJECTION SUBCUTANEOUS at 08:45

## 2024-12-15 RX ADMIN — METFORMIN HYDROCHLORIDE 500 MG: 500 TABLET, FILM COATED ORAL at 20:02

## 2024-12-15 RX ADMIN — Medication 10 ML: at 08:45

## 2024-12-15 RX ADMIN — INSULIN LISPRO 4 UNITS: 100 INJECTION, SOLUTION INTRAVENOUS; SUBCUTANEOUS at 08:57

## 2024-12-15 RX ADMIN — IPRATROPIUM BROMIDE AND ALBUTEROL SULFATE 3 ML: 2.5; .5 SOLUTION RESPIRATORY (INHALATION) at 23:45

## 2024-12-15 RX ADMIN — PANTOPRAZOLE SODIUM 40 MG: 40 TABLET, DELAYED RELEASE ORAL at 06:32

## 2024-12-15 RX ADMIN — INSULIN LISPRO 7 UNITS: 100 INJECTION, SOLUTION INTRAVENOUS; SUBCUTANEOUS at 20:12

## 2024-12-15 RX ADMIN — AMPICILLIN SODIUM, SULBACTAM SODIUM 3 G: 2; 1 INJECTION, POWDER, FOR SOLUTION INTRAMUSCULAR; INTRAVENOUS at 02:14

## 2024-12-15 RX ADMIN — IPRATROPIUM BROMIDE AND ALBUTEROL SULFATE 3 ML: 2.5; .5 SOLUTION RESPIRATORY (INHALATION) at 18:51

## 2024-12-15 RX ADMIN — METHYLPREDNISOLONE SODIUM SUCCINATE 40 MG: 40 INJECTION, POWDER, FOR SOLUTION INTRAMUSCULAR; INTRAVENOUS at 08:45

## 2024-12-15 RX ADMIN — FAMOTIDINE 20 MG: 20 TABLET, FILM COATED ORAL at 08:44

## 2024-12-15 RX ADMIN — FAMOTIDINE 20 MG: 20 TABLET, FILM COATED ORAL at 20:01

## 2024-12-15 RX ADMIN — INSULIN LISPRO 4 UNITS: 100 INJECTION, SOLUTION INTRAVENOUS; SUBCUTANEOUS at 17:32

## 2024-12-15 RX ADMIN — FLUOXETINE HYDROCHLORIDE 20 MG: 20 CAPSULE ORAL at 08:44

## 2024-12-15 NOTE — PROGRESS NOTES
Assessment tool to be used for patients with existing breathing treatments ordered by hospitalist                               Respiratory Therapist Driven Protocol - RT to Assess and Treat Algorithm    Item 0 Points 1 Point 2 Points 3 Points 4 Points Subtotal   Mental Status Alert, orientated, cooperative Lethargic, follows commands Confused, not following commands Obtunded or Somnolent Comatose 0   Respiratory Pattern Regular RR  8-16 breaths/minute Increased RR  18-25 breaths/minute Dyspnea on exertion, irregular RR  26-30/minute Shortness of breath,  RR 31-35 breaths/minute Accessory muscle use, severe SOB  RR > 35 breath/minute 1   Breath Sounds Clear Decreased unilaterally Decreased bilaterally Basilar crackles Wheezing and/or rhonchi 3   Cough Strong, spontaneous non-productive Strong productive Weak, non-productive Weak productive or weak with rhonchi Absent or may require suctioning 0   Pulmonary Status Nonsmoker or no previous history > 1 year quit < 1 PPD  < 1 year quit >  or = 1 PPD Diagnosed pulmonary disease (severe or chronic) Severe or chronic pulmonary disease with exacerbation 0   Surgical Status None General surgery (non-abdominal or non-thoracic) Lower abdominal Thoracic or upper abdominal Thoracic with pulmonary disease 0   Chest X-ray Clear Chronic changes Infiltrates, atelectasis or pleural effusion Infiltrates > 1 lobe Diffuse infiltrates and atelectasis and/or effusions 2   Activity level Ambulatory Ambulatory with assistance Non-ambulatory Paraplegic Quadriplegic 0                     Total Score 6     Score    Drug Therapy Frequency  20 or >    Q4 Duoneb & Q3 Albuterol PRN 15 - 19     Q6 Duoneb & Q4 Albuterol PRN 10 - 14    QID Duoneb & Q4 Albuterol PRN 5 - 9    TID Duoneb & Q6 Albuterol PRN 0 - 4    Q4 PRN Duoneb or Q4 PRN Albuterol    Incentive Spirometry - Initial RT instruct    Lung Expansion Therapy (PEP) Bronchopulmonary Hygiene (CPT)   Q4 & PRN - Severe  atelectasis, poor oxygenation Q4 - copious secretions, dyspnea, unable to sleep, mucus plugging   QID - High risk for persistent atelectasis, existence of atelectasis QID & Q4 PRN - Moderate secretion production   TID - At risk for developing atelectasis TID - small amounts of secretions with poor cough   BID - prevention of atelectasis BID - unable to breathe deeply and cough spontaneously   *RT Protocol patients will be re-assessed/re-evaluated every 48 hours.    *Patients who are home nebulizer treatments will be protocoled to no less than their home regimen and will remain     on their home regimen with re-evaluations as needed with changes in patient condition.    RT Comments/Recommendations: duoneb q6

## 2024-12-15 NOTE — PROGRESS NOTES
Daily Progress Note  Richard NOLAN Jr.  MRN: [id number] LOS: 3    Admit Date: 12/12/2024   12/15/2024 11:33 CST    Subjective:      Chief Complaint:  Chief Complaint   Patient presents with    Cough    Aspiration    Shortness of Breath       Interval History:    Reviewed overnight events and nursing notes.   No new events overnight still coughing and congested.    Review of Systems   Constitutional:  Positive for fatigue.   HENT:  Positive for congestion.    Respiratory:  Positive for cough and shortness of breath.    Cardiovascular: Negative.    Gastrointestinal: Negative.    Genitourinary: Negative.    Musculoskeletal: Negative.    Neurological:  Positive for weakness.       DIET:  Diet: Diabetic; Consistent Carbohydrate; Texture: Soft to Chew (NDD 3); Soft to Chew: Ground Meat; Fluid Consistency: Honey Thick    Medications:      ampicillin-sulbactam, 3 g, Intravenous, Q6H  aspirin, 81 mg, Oral, Daily  empagliflozin, 25 mg, Oral, Daily  enoxaparin, 40 mg, Subcutaneous, Daily  famotidine, 20 mg, Oral, BID  FLUoxetine, 20 mg, Oral, Daily  insulin lispro, 2-9 Units, Subcutaneous, 4x Daily AC & at Bedtime  ipratropium-albuterol, 3 mL, Nebulization, Q6H - RT  levothyroxine, 25 mcg, Oral, Q AM  linagliptin, 5 mg, Oral, Daily   And  metFORMIN, 500 mg, Oral, BID  lisinopril, 10 mg, Oral, Daily  methylPREDNISolone sodium succinate, 40 mg, Intravenous, Daily  pantoprazole, 40 mg, Oral, Q AM  sodium chloride, 10 mL, Intravenous, Q12H        Data:     Code Status:   Code Status and Medical Interventions: CPR (Attempt to Resuscitate); Full Support   Ordered at: 12/13/24 0037     Level Of Support Discussed With:    Patient     Code Status (Patient has no pulse and is not breathing):    CPR (Attempt to Resuscitate)     Medical Interventions (Patient has pulse or is breathing):    Full Support       Family History   Problem Relation Age of Onset    Colon polyps Mother     Colon polyps Brother     Colon cancer Neg Hx      Social  History     Socioeconomic History    Marital status:    Tobacco Use    Smoking status: Never    Smokeless tobacco: Never   Vaping Use    Vaping status: Never Used   Substance and Sexual Activity    Alcohol use: Yes     Comment: occ    Drug use: Never    Sexual activity: Defer       Labs:    Lab Results (last 72 hours)       Procedure Component Value Units Date/Time    POC Glucose Once [[id number]]  (Abnormal) Collected: 12/14/24 0815    Specimen: Blood Updated: 12/14/24 0828     Glucose 149 mg/dL      Comment: : [id number] Jace Barriga (Rosa)Meter ID: [id number]       Basic Metabolic Panel [[id number]]  (Abnormal) Collected: 12/14/24 0747    Specimen: Blood Updated: 12/14/24 0818     Glucose 175 mg/dL      BUN 17 mg/dL      Creatinine 0.45 mg/dL      Sodium 138 mmol/L      Potassium 4.4 mmol/L      Chloride 100 mmol/L      CO2 25.0 mmol/L      Calcium 9.0 mg/dL      BUN/Creatinine Ratio 37.8     Anion Gap 13.0 mmol/L      eGFR 111.2 mL/min/1.73     Narrative:      GFR Categories in Chronic Kidney Disease (CKD)      GFR Category          GFR (mL/min/1.73)    Interpretation  G1                     90 or greater         Normal or high (1)  G2                      60-89                Mild decrease (1)  G3a                   45-59                Mild to moderate decrease  G3b                   30-44                Moderate to severe decrease  G4                    15-29                Severe decrease  G5                    14 or less           Kidney failure          (1)In the absence of evidence of kidney disease, neither GFR category G1 or G2 fulfill the criteria for CKD.    eGFR calculation 2021 CKD-EPI creatinine equation, which does not include race as a factor    CBC (No Diff) [[id number]]  (Normal) Collected: 12/14/24 0747    Specimen: Blood Updated: 12/14/24 0759     WBC 9.47 10*3/mm3      RBC 4.79 10*6/mm3      Hemoglobin 13.4 g/dL      Hematocrit 42.4 %      MCV 88.5 fL      MCH 28.0 pg      MCHC 31.6  g/dL      RDW 14.2 %      RDW-SD 45.3 fl      MPV 9.2 fL      Platelets 309 10*3/mm3     POC Glucose Once [[id number]]  (Abnormal) Collected: 12/13/24 2128    Specimen: Blood Updated: 12/13/24 2139     Glucose 226 mg/dL      Comment: : [id number] Elliott HeatherMeter ID: [id number]       POC Glucose Once [[id number]]  (Abnormal) Collected: 12/13/24 1629    Specimen: Blood Updated: 12/13/24 1642     Glucose 151 mg/dL      Comment: : [id number] Madison BreehaMeter ID: [id number]       POC Glucose Once [[id number]]  (Abnormal) Collected: 12/13/24 1125    Specimen: Blood Updated: 12/13/24 1150     Glucose 229 mg/dL      Comment: : [id number] Madison BreehaMeter ID: [id number]       POC Glucose Once [[id number]]  (Normal) Collected: 12/13/24 0735    Specimen: Blood Updated: 12/13/24 0747     Glucose 87 mg/dL      Comment: : [id number] Erickajudy OliviaMeter ID: [id number]       Comprehensive Metabolic Panel [[id number]]  (Abnormal) Collected: 12/13/24 0439    Specimen: Blood Updated: 12/13/24 0512     Glucose 97 mg/dL      BUN 15 mg/dL      Creatinine 0.37 mg/dL      Sodium 139 mmol/L      Potassium 4.0 mmol/L      Chloride 103 mmol/L      CO2 24.0 mmol/L      Calcium 9.1 mg/dL      Total Protein 7.1 g/dL      Albumin 3.7 g/dL      ALT (SGPT) 46 U/L      AST (SGOT) 83 U/L      Alkaline Phosphatase 84 U/L      Total Bilirubin 0.4 mg/dL      Globulin 3.4 gm/dL      A/G Ratio 1.1 g/dL      BUN/Creatinine Ratio 40.5     Anion Gap 12.0 mmol/L      eGFR 118.0 mL/min/1.73     Narrative:      GFR Categories in Chronic Kidney Disease (CKD)      GFR Category          GFR (mL/min/1.73)    Interpretation  G1                     90 or greater         Normal or high (1)  G2                      60-89                Mild decrease (1)  G3a                   45-59                Mild to moderate decrease  G3b                   30-44                Moderate to severe decrease  G4                    15-29                Severe  decrease  G5                    14 or less           Kidney failure          (1)In the absence of evidence of kidney disease, neither GFR category G1 or G2 fulfill the criteria for CKD.    eGFR calculation 2021 CKD-EPI creatinine equation, which does not include race as a factor    CBC (No Diff) [[id number]]  (Normal) Collected: 12/13/24 0439    Specimen: Blood Updated: 12/13/24 0451     WBC 8.52 10*3/mm3      RBC 4.85 10*6/mm3      Hemoglobin 13.6 g/dL      Hematocrit 42.7 %      MCV 88.0 fL      MCH 28.0 pg      MCHC 31.9 g/dL      RDW 14.3 %      RDW-SD 46.0 fl      MPV 9.2 fL      Platelets 298 10*3/mm3     POC Glucose Once [[id number]]  (Normal) Collected: 12/13/24 0125    Specimen: Blood Updated: 12/13/24 0136     Glucose 82 mg/dL      Comment: : [id number] Floydada CourtneyMeter ID: [id number]       High Sensitivity Troponin T 1Hr [[id number]] Collected: 12/12/24 2035    Specimen: Blood Updated: 12/12/24 2132     HS Troponin T 21 ng/L      Troponin T Delta -7 ng/L      Troponin T % Change -25 %     Narrative:      High Sensitive Troponin T Reference Range:  <14.0 ng/L- Negative Female for AMI  <22.0 ng/L- Negative Male for AMI  >=14 - Abnormal Female indicating possible myocardial injury.  >=22 - Abnormal Male indicating possible myocardial injury.   Clinicians would have to utilize clinical acumen, EKG, Troponin, and serial changes to determine if it is an Acute Myocardial Infarction or myocardial injury due to an underlying chronic condition.         Respiratory Panel PCR w/COVID-19(SARS-CoV-2) GARRISON/ROGELIO/JONATHON/PAD/COR/ERNIE In-House, NP Swab in UTM/VTM, 2 HR TAT - Swab, Nasopharynx [[id number]]  (Normal) Collected: 12/12/24 1927    Specimen: Swab from Nasopharynx Updated: 12/12/24 2038     ADENOVIRUS, PCR Not Detected     Coronavirus 229E Not Detected     Coronavirus HKU1 Not Detected     Coronavirus NL63 Not Detected     Coronavirus OC43 Not Detected     COVID19 Not Detected     Human Metapneumovirus Not Detected     " Human Rhinovirus/Enterovirus Not Detected     Influenza A PCR Not Detected     Influenza B PCR Not Detected     Parainfluenza Virus 1 Not Detected     Parainfluenza Virus 2 Not Detected     Parainfluenza Virus 3 Not Detected     Parainfluenza Virus 4 Not Detected     RSV, PCR Not Detected     Bordetella pertussis pcr Not Detected     Bordetella parapertussis PCR Not Detected     Chlamydophila pneumoniae PCR Not Detected     Mycoplasma pneumo by PCR Not Detected    Narrative:      In the setting of a positive respiratory panel with a viral infection PLUS a negative procalcitonin without other underlying concern for bacterial infection, consider observing off antibiotics or discontinuation of antibiotics and continue supportive care. If the respiratory panel is positive for atypical bacterial infection (Bordetella pertussis, Chlamydophila pneumoniae, or Mycoplasma pneumoniae), consider antibiotic de-escalation to target atypical bacterial infection.    D-dimer, Quantitative [[id number]]  (Abnormal) Collected: 12/12/24 1926    Specimen: Blood Updated: 12/12/24 2018     D-Dimer, Quantitative 0.79 MCGFEU/mL     Narrative:      According to the assay 's published package insert, a normal (<0.50 MCGFEU/mL) D-dimer result in conjunction with a non-high clinical probability assessment, excludes deep vein thrombosis (DVT) and pulmonary embolism (PE) with high sensitivity.    D-dimer values increase with age and this can make VTE exclusion of an older population difficult. To address this, the [employer], based on best available evidence and recent guidelines, recommends that clinicians use age-adjusted D-dimer thresholds in patients greater than 50 years of age with: a) a low probability of PE who do not meet all Pulmonary Embolism Rule Out Criteria, or b) in those with intermediate probability of PE.   The formula for an age-adjusted D-dimer cut-off is \"age/100\".  For example, a 60 year old " patient would have an age-adjusted cut-off of 0.60 MCGFEU/mL and an 80 year old 0.80 MCGFEU/mL.    Comprehensive Metabolic Panel [[id number]]  (Abnormal) Collected: 12/12/24 1926    Specimen: Blood Updated: 12/12/24 2008     Glucose 125 mg/dL      BUN 18 mg/dL      Creatinine 0.55 mg/dL      Sodium 139 mmol/L      Potassium 4.6 mmol/L      Chloride 101 mmol/L      CO2 24.0 mmol/L      Calcium 9.6 mg/dL      Total Protein 8.1 g/dL      Albumin 4.2 g/dL      ALT (SGPT) 58 U/L      AST (SGOT) 106 U/L      Alkaline Phosphatase 99 U/L      Total Bilirubin 0.4 mg/dL      Globulin 3.9 gm/dL      A/G Ratio 1.1 g/dL      BUN/Creatinine Ratio 32.7     Anion Gap 14.0 mmol/L      eGFR 104.6 mL/min/1.73     Narrative:      GFR Categories in Chronic Kidney Disease (CKD)      GFR Category          GFR (mL/min/1.73)    Interpretation  G1                     90 or greater         Normal or high (1)  G2                      60-89                Mild decrease (1)  G3a                   45-59                Mild to moderate decrease  G3b                   30-44                Moderate to severe decrease  G4                    15-29                Severe decrease  G5                    14 or less           Kidney failure          (1)In the absence of evidence of kidney disease, neither GFR category G1 or G2 fulfill the criteria for CKD.    eGFR calculation 2021 CKD-EPI creatinine equation, which does not include race as a factor    BNP [[id number]]  (Normal) Collected: 12/12/24 1926    Specimen: Blood Updated: 12/12/24 2005     proBNP 325.8 pg/mL     Narrative:      This assay is used as an aid in the diagnosis of individuals suspected of having heart failure. It can be used as an aid in the diagnosis of acute decompensated heart failure (ADHF) in patients presenting with signs and symptoms of ADHF to the emergency department (ED). In addition, NT-proBNP of <300 pg/mL indicates ADHF is not likely.    Age Range Result Interpretation   NT-proBNP Concentration (pg/mL:      <50             Positive            >450                   Gray                 300-450                    Negative             <300    50-75           Positive            >900                  Gray                300-900                  Negative            <300      >75             Positive            >1800                  Gray                300-1800                  Negative            <300    High Sensitivity Troponin T [[id number]]  (Abnormal) Collected: 12/12/24 1926    Specimen: Blood Updated: 12/12/24 2004     HS Troponin T 28 ng/L     Narrative:      High Sensitive Troponin T Reference Range:  <14.0 ng/L- Negative Female for AMI  <22.0 ng/L- Negative Male for AMI  >=14 - Abnormal Female indicating possible myocardial injury.  >=22 - Abnormal Male indicating possible myocardial injury.   Clinicians would have to utilize clinical acumen, EKG, Troponin, and serial changes to determine if it is an Acute Myocardial Infarction or myocardial injury due to an underlying chronic condition.         CBC & Differential [[id number]]  (Abnormal) Collected: 12/12/24 1926    Specimen: Blood Updated: 12/12/24 1947    Narrative:      The following orders were created for panel order CBC & Differential.  Procedure                               Abnormality         Status                     ---------                               -----------         ------                     CBC Auto Differential[[id number]]        Abnormal            Final result                 Please view results for these tests on the individual orders.    CBC Auto Differential [[id number]]  (Abnormal) Collected: 12/12/24 1926    Specimen: Blood Updated: 12/12/24 1947     WBC 7.90 10*3/mm3      RBC 5.24 10*6/mm3      Hemoglobin 14.7 g/dL      Hematocrit 47.3 %      MCV 90.3 fL      MCH 28.1 pg      MCHC 31.1 g/dL      RDW 14.1 %      RDW-SD 46.6 fl      MPV 9.4 fL      Platelets 324 10*3/mm3      Neutrophil % 71.1 %       Lymphocyte % 14.6 %      Monocyte % 11.3 %      Eosinophil % 2.2 %      Basophil % 0.5 %      Immature Grans % 0.3 %      Neutrophils, Absolute 5.63 10*3/mm3      Lymphocytes, Absolute 1.15 10*3/mm3      Monocytes, Absolute 0.89 10*3/mm3      Eosinophils, Absolute 0.17 10*3/mm3      Basophils, Absolute 0.04 10*3/mm3      Immature Grans, Absolute 0.02 10*3/mm3      nRBC 0.0 /100 WBC     [facility] Draw [[id number]] Collected: 12/12/24 1926    Specimen: Blood Updated: 12/12/24 1945    Narrative:      The following orders were created for panel order [facility].  Procedure                               Abnormality         Status                     ---------                               -----------         ------                     Green Top (Gel)[[id number]]                                  Final result               Lavender Top[[id number]]                                     Final result               Red Top[[id number]]                                          Final result               Gray Top[[id number]]                                         Final result               Light Blue Top[[id number]]                                   Final result                 Please view results for these tests on the individual orders.    Green Top (Gel) [[id number]] Collected: 12/12/24 1926    Specimen: Blood Updated: 12/12/24 1945     Extra Tube Hold for add-ons.     Comment: Auto resulted.       Lavender Top [[id number]] Collected: 12/12/24 1926    Specimen: Blood Updated: 12/12/24 1945     Extra Tube hold for add-on     Comment: Auto resulted       Red Top [[id number]] Collected: 12/12/24 1926    Specimen: Blood Updated: 12/12/24 1945     Extra Tube Hold for add-ons.     Comment: Auto resulted.       Gray Top [[id number]] Collected: 12/12/24 1926    Specimen: Blood Updated: 12/12/24 1945     Extra Tube Hold for add-ons.     Comment: Auto resulted.       Light Blue Top [[id number]] Collected: 12/12/24 1926    Specimen: Blood Updated:  "24     Extra Tube Hold for add-ons.     Comment: Auto resulted       POC Glucose Once [[id number]]  (Normal) Collected: 24    Specimen: Blood Updated: 24     Glucose 123 mg/dL      Comment: : [id number] Alan KittyMeter ID: [id number]                 Objective:     Vitals: /57 (BP Location: Right arm, Patient Position: Lying)   Pulse 85   Temp 98.4 °F (36.9 °C) (Oral)   Resp 18   Ht 177.8 cm (70\")   Wt 92.9 kg (204 lb 12.8 oz)   SpO2 95%   BMI 29.39 kg/m²    Intake/Output Summary (Last 24 hours) at 12/15/2024 1133  Last data filed at 12/15/2024 0852  Gross per 24 hour   Intake 240 ml   Output 1050 ml   Net -810 ml    Temp (24hrs), Av °F (36.7 °C), Min:97.7 °F (36.5 °C), Max:98.4 °F (36.9 °C)      Physical Exam  Vitals and nursing note reviewed.   Constitutional:       Appearance: Normal appearance.   HENT:      Head: Normocephalic and atraumatic.   Eyes:      Pupils: Pupils are equal, round, and reactive to light.   Cardiovascular:      Rate and Rhythm: Normal rate and regular rhythm.      Pulses: Normal pulses.   Pulmonary:      Effort: Respiratory distress present.      Breath sounds: Rales present.   Abdominal:      General: Abdomen is flat. Bowel sounds are normal.      Palpations: Abdomen is soft.   Musculoskeletal:         General: Normal range of motion.   Skin:     General: Skin is warm.      Capillary Refill: Capillary refill takes less than 2 seconds.   Neurological:      Mental Status: He is alert.             Assessment and Plan:     Primary Problem:  Pneumonia--aspiration  Dysphagia  Type 2 diabetes mellitus    Hospital Problem list:    Pneumonia      PMH:  Past Medical History:   Diagnosis Date    Cancer     PROSTATE CANCER     Colon polyps     Cystitis     Depression     Diabetes mellitus     Enlarged prostate     Esophagitis     GERD (gastroesophageal reflux disease)     [place] (hard of hearing)     Hypertension     Kidney stone     OPCD " (olivopontocerebellar degeneration)     PONV (postoperative nausea and vomiting)     Skin cancer     Varicose vein of leg        Treatment Plan:  Aspiration pneumonia/pneumonitis  Had aspiration event on Monday, now has some radiographic findings of pneumonia.  Will start unasyn, nebs, and steroids.  Hopefully can improve over the weekend and discharge early next week     Dysphagia  Related to underlying neurodegenerative disease, will have speech follow     Type 2 Diabetes  Will continue lantus at 10 units plus sliding scale in addition to home trajenta, metformin and jardiance    Disposition: Home    Reviewed treatment plans with the patient and/or family.   30 minutes spent in face to face interaction and coordination of care.     Electronically signed by Marcelino Castillo MD on 12/15/2024 at 11:33 CST

## 2024-12-15 NOTE — PLAN OF CARE
Goal Outcome Evaluation:  Plan of Care Reviewed With: patient        Progress: improving  Outcome Evaluation: A/o x4. No c/o pain this shift. Safety maintained. IV IID. Cont IV abx as ordered. Lung sounds diminished. O2 @2L. Up with asst x1. Voiding per urinal. Tolerating soft diet with honey thick liquids. Medications administered in AS. Tele on. Accu checks ACHS. Sliding scale insulin available as needed. Cont to monitor.

## 2024-12-15 NOTE — PLAN OF CARE
Goal Outcome Evaluation:  Plan of Care Reviewed With: patient        Progress: no change  Outcome Evaluation: Pt A/Ox4. IV abx given as scheduled. New IV to right posterior forearm. No c/o pain thus far. Safety maintained. Call light in reach.

## 2024-12-16 LAB
ANION GAP SERPL CALCULATED.3IONS-SCNC: 13 MMOL/L (ref 5–15)
BUN SERPL-MCNC: 20 MG/DL (ref 8–23)
BUN/CREAT SERPL: 42.6 (ref 7–25)
CALCIUM SPEC-SCNC: 9 MG/DL (ref 8.6–10.5)
CHLORIDE SERPL-SCNC: 102 MMOL/L (ref 98–107)
CO2 SERPL-SCNC: 26 MMOL/L (ref 22–29)
CREAT SERPL-MCNC: 0.47 MG/DL (ref 0.76–1.27)
DEPRECATED RDW RBC AUTO: 48 FL (ref 37–54)
EGFRCR SERPLBLD CKD-EPI 2021: 109.7 ML/MIN/1.73
ERYTHROCYTE [DISTWIDTH] IN BLOOD BY AUTOMATED COUNT: 14.6 % (ref 12.3–15.4)
GLUCOSE BLDC GLUCOMTR-MCNC: 189 MG/DL (ref 70–130)
GLUCOSE BLDC GLUCOMTR-MCNC: 213 MG/DL (ref 70–130)
GLUCOSE BLDC GLUCOMTR-MCNC: 261 MG/DL (ref 70–130)
GLUCOSE BLDC GLUCOMTR-MCNC: 269 MG/DL (ref 70–130)
GLUCOSE SERPL-MCNC: 173 MG/DL (ref 65–99)
HCT VFR BLD AUTO: 43.2 % (ref 37.5–51)
HGB BLD-MCNC: 13.4 G/DL (ref 13–17.7)
MCH RBC QN AUTO: 28 PG (ref 26.6–33)
MCHC RBC AUTO-ENTMCNC: 31 G/DL (ref 31.5–35.7)
MCV RBC AUTO: 90.2 FL (ref 79–97)
PLATELET # BLD AUTO: 311 10*3/MM3 (ref 140–450)
PMV BLD AUTO: 9.2 FL (ref 6–12)
POTASSIUM SERPL-SCNC: 4.3 MMOL/L (ref 3.5–5.2)
RBC # BLD AUTO: 4.79 10*6/MM3 (ref 4.14–5.8)
SODIUM SERPL-SCNC: 141 MMOL/L (ref 136–145)
WBC NRBC COR # BLD AUTO: 7.58 10*3/MM3 (ref 3.4–10.8)

## 2024-12-16 PROCEDURE — 63710000001 INSULIN LISPRO (HUMAN) PER 5 UNITS

## 2024-12-16 PROCEDURE — 97161 PT EVAL LOW COMPLEX 20 MIN: CPT | Performed by: PHYSICAL THERAPIST

## 2024-12-16 PROCEDURE — 82948 REAGENT STRIP/BLOOD GLUCOSE: CPT

## 2024-12-16 PROCEDURE — 63710000001 PREDNISONE PER 1 MG: Performed by: STUDENT IN AN ORGANIZED HEALTH CARE EDUCATION/TRAINING PROGRAM

## 2024-12-16 PROCEDURE — 94799 UNLISTED PULMONARY SVC/PX: CPT

## 2024-12-16 PROCEDURE — 63710000001 INSULIN GLARGINE PER 5 UNITS: Performed by: STUDENT IN AN ORGANIZED HEALTH CARE EDUCATION/TRAINING PROGRAM

## 2024-12-16 PROCEDURE — 94761 N-INVAS EAR/PLS OXIMETRY MLT: CPT

## 2024-12-16 PROCEDURE — 25010000002 ENOXAPARIN PER 10 MG: Performed by: STUDENT IN AN ORGANIZED HEALTH CARE EDUCATION/TRAINING PROGRAM

## 2024-12-16 PROCEDURE — 92526 ORAL FUNCTION THERAPY: CPT

## 2024-12-16 PROCEDURE — 36415 COLL VENOUS BLD VENIPUNCTURE: CPT | Performed by: STUDENT IN AN ORGANIZED HEALTH CARE EDUCATION/TRAINING PROGRAM

## 2024-12-16 PROCEDURE — 94664 DEMO&/EVAL PT USE INHALER: CPT

## 2024-12-16 PROCEDURE — 25010000002 AMPICILLIN-SULBACTAM PER 1.5 G: Performed by: STUDENT IN AN ORGANIZED HEALTH CARE EDUCATION/TRAINING PROGRAM

## 2024-12-16 PROCEDURE — 80048 BASIC METABOLIC PNL TOTAL CA: CPT | Performed by: STUDENT IN AN ORGANIZED HEALTH CARE EDUCATION/TRAINING PROGRAM

## 2024-12-16 PROCEDURE — 85027 COMPLETE CBC AUTOMATED: CPT | Performed by: STUDENT IN AN ORGANIZED HEALTH CARE EDUCATION/TRAINING PROGRAM

## 2024-12-16 RX ORDER — PREDNISONE 20 MG/1
20 TABLET ORAL
Status: DISCONTINUED | OUTPATIENT
Start: 2024-12-16 | End: 2024-12-17 | Stop reason: HOSPADM

## 2024-12-16 RX ADMIN — LINAGLIPTIN 5 MG: 5 TABLET, FILM COATED ORAL at 08:41

## 2024-12-16 RX ADMIN — INSULIN LISPRO 2 UNITS: 100 INJECTION, SOLUTION INTRAVENOUS; SUBCUTANEOUS at 08:41

## 2024-12-16 RX ADMIN — AMPICILLIN SODIUM, SULBACTAM SODIUM 3 G: 2; 1 INJECTION, POWDER, FOR SOLUTION INTRAMUSCULAR; INTRAVENOUS at 03:15

## 2024-12-16 RX ADMIN — METFORMIN HYDROCHLORIDE 500 MG: 500 TABLET, FILM COATED ORAL at 08:41

## 2024-12-16 RX ADMIN — Medication 10 ML: at 21:29

## 2024-12-16 RX ADMIN — FAMOTIDINE 20 MG: 20 TABLET, FILM COATED ORAL at 08:41

## 2024-12-16 RX ADMIN — ENOXAPARIN SODIUM 40 MG: 100 INJECTION SUBCUTANEOUS at 08:41

## 2024-12-16 RX ADMIN — AMPICILLIN SODIUM, SULBACTAM SODIUM 3 G: 2; 1 INJECTION, POWDER, FOR SOLUTION INTRAMUSCULAR; INTRAVENOUS at 21:29

## 2024-12-16 RX ADMIN — AMPICILLIN SODIUM, SULBACTAM SODIUM 3 G: 2; 1 INJECTION, POWDER, FOR SOLUTION INTRAMUSCULAR; INTRAVENOUS at 08:41

## 2024-12-16 RX ADMIN — INSULIN GLARGINE 15 UNITS: 100 INJECTION, SOLUTION SUBCUTANEOUS at 21:33

## 2024-12-16 RX ADMIN — EMPAGLIFLOZIN 25 MG: 25 TABLET, FILM COATED ORAL at 08:40

## 2024-12-16 RX ADMIN — INSULIN LISPRO 4 UNITS: 100 INJECTION, SOLUTION INTRAVENOUS; SUBCUTANEOUS at 17:09

## 2024-12-16 RX ADMIN — INSULIN LISPRO 6 UNITS: 100 INJECTION, SOLUTION INTRAVENOUS; SUBCUTANEOUS at 12:26

## 2024-12-16 RX ADMIN — ASPIRIN 81 MG: 81 TABLET, COATED ORAL at 08:40

## 2024-12-16 RX ADMIN — METFORMIN HYDROCHLORIDE 500 MG: 500 TABLET, FILM COATED ORAL at 21:28

## 2024-12-16 RX ADMIN — PREDNISONE 20 MG: 20 TABLET ORAL at 08:57

## 2024-12-16 RX ADMIN — INSULIN LISPRO 6 UNITS: 100 INJECTION, SOLUTION INTRAVENOUS; SUBCUTANEOUS at 21:33

## 2024-12-16 RX ADMIN — FAMOTIDINE 20 MG: 20 TABLET, FILM COATED ORAL at 21:29

## 2024-12-16 RX ADMIN — PANTOPRAZOLE SODIUM 40 MG: 40 TABLET, DELAYED RELEASE ORAL at 06:12

## 2024-12-16 RX ADMIN — LEVOTHYROXINE SODIUM 25 MCG: 0.03 TABLET ORAL at 06:12

## 2024-12-16 RX ADMIN — AMPICILLIN SODIUM, SULBACTAM SODIUM 3 G: 2; 1 INJECTION, POWDER, FOR SOLUTION INTRAMUSCULAR; INTRAVENOUS at 14:48

## 2024-12-16 RX ADMIN — FLUOXETINE HYDROCHLORIDE 20 MG: 20 CAPSULE ORAL at 08:40

## 2024-12-16 RX ADMIN — LISINOPRIL 10 MG: 10 TABLET ORAL at 08:40

## 2024-12-16 RX ADMIN — Medication 10 ML: at 08:41

## 2024-12-16 RX ADMIN — IPRATROPIUM BROMIDE AND ALBUTEROL SULFATE 3 ML: 2.5; .5 SOLUTION RESPIRATORY (INHALATION) at 06:19

## 2024-12-16 RX ADMIN — IPRATROPIUM BROMIDE AND ALBUTEROL SULFATE 3 ML: 2.5; .5 SOLUTION RESPIRATORY (INHALATION) at 13:14

## 2024-12-16 RX ADMIN — IPRATROPIUM BROMIDE AND ALBUTEROL SULFATE 3 ML: 2.5; .5 SOLUTION RESPIRATORY (INHALATION) at 20:54

## 2024-12-16 NOTE — PROGRESS NOTES
"    Daily Progress Note  Richard NOLAN Jr.  MRN: [id number] LOS: 4    Admit Date: 2024 07:43 CST    Subjective:         Interval History:    Reviewed overnight events and nursing notes.     Doing well this morning.  No major complaints.  Feels like his cough is improving but has not been moving at his baseline.    ROS:  Review of Systems   Constitutional:  Negative for chills and fever.   Respiratory:  Negative for cough, chest tightness and shortness of breath.    Cardiovascular:  Negative for chest pain.   Gastrointestinal:  Negative for abdominal pain, diarrhea, nausea and vomiting.       DIET:  Diet: Diabetic; Consistent Carbohydrate; Texture: Soft to Chew (NDD 3); Soft to Chew: Ground Meat; Fluid Consistency: Honey Thick    Medications:      ampicillin-sulbactam, 3 g, Intravenous, Q6H  aspirin, 81 mg, Oral, Daily  empagliflozin, 25 mg, Oral, Daily  enoxaparin, 40 mg, Subcutaneous, Daily  famotidine, 20 mg, Oral, BID  FLUoxetine, 20 mg, Oral, Daily  insulin glargine, 15 Units, Subcutaneous, Nightly  insulin lispro, 2-9 Units, Subcutaneous, 4x Daily AC & at Bedtime  ipratropium-albuterol, 3 mL, Nebulization, Q6H - RT  levothyroxine, 25 mcg, Oral, Q AM  linagliptin, 5 mg, Oral, Daily   And  metFORMIN, 500 mg, Oral, BID  lisinopril, 10 mg, Oral, Daily  pantoprazole, 40 mg, Oral, Q AM  predniSONE, 20 mg, Oral, Daily With Breakfast  sodium chloride, 10 mL, Intravenous, Q12H          Objective:     Vitals: /69 (BP Location: Right arm, Patient Position: Lying)   Pulse 85   Temp 97.8 °F (36.6 °C) (Oral)   Resp 15   Ht 177.8 cm (70\")   Wt 92.9 kg (204 lb 12.8 oz)   SpO2 98%   BMI 29.39 kg/m²    Intake/Output Summary (Last 24 hours) at 2024 0743  Last data filed at 12/15/2024 2002  Gross per 24 hour   Intake 720 ml   Output 350 ml   Net 370 ml    Temp (24hrs), Av.9 °F (36.6 °C), Min:97 °F (36.1 °C), Max:98.4 °F (36.9 °C)    Glucose:  Lab Results   Component Value Date    POCGLU 339 " (H) 12/15/2024    POCGLU 224 (H) 12/15/2024    POCGLU 273 (H) 12/15/2024    POCGLU 222 (H) 12/15/2024     Physical Examination:   Physical Exam  Constitutional:       General: He is not in acute distress.     Appearance: Normal appearance. He is normal weight. He is not ill-appearing.   Cardiovascular:      Rate and Rhythm: Normal rate and regular rhythm.      Pulses: Normal pulses.      Heart sounds: Normal heart sounds. No murmur heard.  Pulmonary:      Effort: Pulmonary effort is normal.      Breath sounds: Normal breath sounds. No wheezing, rhonchi or rales.   Abdominal:      General: Abdomen is flat. Bowel sounds are normal.      Palpations: Abdomen is soft.      Tenderness: There is no abdominal tenderness.   Neurological:      Mental Status: He is alert.         Labs:  Lab Results (last 24 hours)       Procedure Component Value Units Date/Time    Basic Metabolic Panel [[id number]]  (Abnormal) Collected: 12/16/24 0524    Specimen: Blood Updated: 12/16/24 0619     Glucose 173 mg/dL      BUN 20 mg/dL      Creatinine 0.47 mg/dL      Sodium 141 mmol/L      Potassium 4.3 mmol/L      Chloride 102 mmol/L      CO2 26.0 mmol/L      Calcium 9.0 mg/dL      BUN/Creatinine Ratio 42.6     Anion Gap 13.0 mmol/L      eGFR 109.7 mL/min/1.73     Narrative:      GFR Categories in Chronic Kidney Disease (CKD)      GFR Category          GFR (mL/min/1.73)    Interpretation  G1                     90 or greater         Normal or high (1)  G2                      60-89                Mild decrease (1)  G3a                   45-59                Mild to moderate decrease  G3b                   30-44                Moderate to severe decrease  G4                    15-29                Severe decrease  G5                    14 or less           Kidney failure          (1)In the absence of evidence of kidney disease, neither GFR category G1 or G2 fulfill the criteria for CKD.    eGFR calculation 2021 CKD-EPI creatinine equation, which  does not include race as a factor    CBC (No Diff) [[id number]]  (Abnormal) Collected: 12/16/24 0524    Specimen: Blood Updated: 12/16/24 0557     WBC 7.58 10*3/mm3      RBC 4.79 10*6/mm3      Hemoglobin 13.4 g/dL      Hematocrit 43.2 %      MCV 90.2 fL      MCH 28.0 pg      MCHC 31.0 g/dL      RDW 14.6 %      RDW-SD 48.0 fl      MPV 9.2 fL      Platelets 311 10*3/mm3     Respiratory Culture - Sputum, Trachea [[id number]] Collected: 12/15/24 1920    Specimen: Sputum from Trachea Updated: 12/16/24 0552     Gram Stain Many (4+) WBCs per low power field      Few (2+) Epithelial cells per low power field      No organisms seen    POC Glucose Once [[id number]]  (Abnormal) Collected: 12/15/24 2008    Specimen: Blood Updated: 12/15/24 2019     Glucose 339 mg/dL      Comment: : [id number] Donte MarcyMeter ID: [id number]       POC Glucose Once [[id number]]  (Abnormal) Collected: 12/15/24 1659    Specimen: Blood Updated: 12/15/24 1710     Glucose 224 mg/dL      Comment: : [id number] Jace DesirenMeter ID: [id number]       POC Glucose Once [[id number]]  (Abnormal) Collected: 12/15/24 1230    Specimen: Blood Updated: 12/15/24 1241     Glucose 273 mg/dL      Comment: : [id number] Becca AmandaMeter ID: [id number]       POC Glucose Once [[id number]]  (Abnormal) Collected: 12/15/24 0847    Specimen: Blood Updated: 12/15/24 0902     Glucose 222 mg/dL      Comment: : [id number] Becca AmandaMeter ID: [id number]                Imaging:  No radiology results from the last 24 hrs       Assessment and Plan:     Primary Problem:  Pneumonia    Hospital Problem list:    Pneumonia      Assessment: 75 yo male who presents with aspiration pneumonia/pneumonitis     Plan:     Aspiration pneumonia/pneumonitis  Continue nebulizers and Unasyn.  Has been on methylprednisolone and will switch that to prednisone today.     Dysphagia  Related to underlying neurodegenerative disease, speech following.     Type 2 Diabetes  Continue home  Tradjenta, metformin, and Jardiance.  Increasing Lantus to 15 units nightly.    Going to work with therapy today, his pulmonary symptoms seem to be improving and can hopefully discharge tomorrow.      Reviewed treatment plans with the patient and/or family.     Code Status:   Code Status and Medical Interventions: CPR (Attempt to Resuscitate); Full Support   Ordered at: 12/13/24 0037     Level Of Support Discussed With:    Patient     Code Status (Patient has no pulse and is not breathing):    CPR (Attempt to Resuscitate)     Medical Interventions (Patient has pulse or is breathing):    Full Support       Electronically signed by Alcides Stein MD on 12/16/2024 at 07:43 CST

## 2024-12-16 NOTE — PLAN OF CARE
Goal Outcome Evaluation:  Plan of Care Reviewed With: patient        Progress: improving  Outcome Evaluation: A/o x4. No c/o pain this shift. Safety maintained. IV IID. Cont IV abx as ordered. Lung sounds diminished. Tolerating RA. Up with asst x1. Voiding per urinal/BRP. Tolerating soft diet with honey thick liquids. Medications administered in AS. Accu checks ACHS. Sliding scale insulin available as needed. Awaiting dc plans. Cont to monitor.

## 2024-12-16 NOTE — THERAPY EVALUATION
Patient Name: Richard NOLAN Jr.  : 1950    MRN: [id number]                              Today's Date: 2024       Admit Date: 2024    Visit Dx:     ICD-10-CM ICD-9-CM   1. Pneumonia due to infectious organism, unspecified laterality, unspecified part of lung  J18.9 486   2. Hypoxia  R09.02 799.02   3. Dysphagia, unspecified type  R13.10 787.20   4. Impaired mobility [Z74.09]  Z74.09 799.89     Patient Active Problem List   Diagnosis    Prostate cancer    Non-smoker    Urinary frequency    Aspirin long-term use    History of radiation therapy    Pharyngoesophageal dysphagia    Hx of colonic polyp    Family hx of colon cancer    Pneumonia     Past Medical History:   Diagnosis Date    Cancer     PROSTATE CANCER     Colon polyps     Cystitis     Depression     Diabetes mellitus     Enlarged prostate     Esophagitis     GERD (gastroesophageal reflux disease)     False Pass (hard of hearing)     Hypertension     Kidney stone     OPCD (olivopontocerebellar degeneration)     PONV (postoperative nausea and vomiting)     Skin cancer     Varicose vein of leg      Past Surgical History:   Procedure Laterality Date    COLONOSCOPY N/A 2022    Procedure: COLONOSCOPY WITH ANESTHESIA;  Surgeon: Adair Beltran MD;  Location: [facility] ENDOSCOPY;  Service: Gastroenterology;  Laterality: N/A;  preop; hx of polyps  postop; polyps   PCP Marcelino Weaver     ENDOSCOPY N/A 2022    Procedure: ESOPHAGOGASTRODUODENOSCOPY WITH ANESTHESIA;  Surgeon: Adair Beltran MD;  Location: [facility] ENDOSCOPY;  Service: Gastroenterology;  Laterality: N/A;  preop; dysphagia  postop; normal   PCP Marcelino Weaver     ENDOSCOPY AND COLONOSCOPY  2015    post gastric bypass, dilation, very small internal hemorroid    EYE SURGERY Bilateral     Cataract    GASTRIC BYPASS      NASAL SEPTUM SURGERY      NECK SURGERY      ORIF TIBIA/FIBULA FRACTURES Right     PROSTATE BIOPSY N/A 2021    Procedure: PROSTATE ULTRASOUND BIOPSY MRI FUSION  WITH URONAV;  Surgeon: Gregorio Lange MD;  Location: [facility] OR;  Service: Urology;  Laterality: N/A;    VEIN LIGATION AND STRIPPING        General Information       Row Name 12/16/24 1120          Physical Therapy Time and Intention    Document Type evaluation  pneumonia  -MS     Mode of Treatment physical therapy;individual therapy  -MS       Row Name 12/16/24 1120          General Information    Patient Profile Reviewed yes  -MS     Prior Level of Function independent:;all household mobility;ADL's;dependent:;driving  sometimes needed assistance getting up  -MS     Existing Precautions/Restrictions fall  -MS     Barriers to Rehab previous functional deficit;physical barrier  -MS       Row Name 12/16/24 1120          Living Environment    People in Home spouse  -MS       Row Name 12/16/24 1120          Home Main Entrance    Number of Stairs, Main Entrance three  -MS     Stair Railings, Main Entrance railings on both sides of stairs  -MS       Row Name 12/16/24 1120          Stairs Within Home, Primary    Number of Stairs, Within Home, Primary none  -MS       Row Name 12/16/24 1120          Cognition    Orientation Status (Cognition) oriented x 4  -MS       Row Name 12/16/24 1120          Safety Issues/Impairments Affecting Functional Mobility    Impairments Affecting Function (Mobility) endurance/activity tolerance;shortness of breath  -MS               User Key  (r) = Recorded By, (t) = Taken By, (c) = Cosigned By      Initials Name Provider Type    MS Ruby Perla R, PT, DPT, NCS Physical Therapist                   Mobility       Row Name 12/16/24 1120          Bed Mobility    Bed Mobility supine-sit  -MS     Supine-Sit [place] (Bed Mobility) minimum assist (75% patient effort);verbal cues;nonverbal cues (demo/gesture)  -MS     Assistive Device (Bed Mobility) head of bed elevated  -MS       Row Name 12/16/24 1120          Sit-Stand Transfer    Sit-Stand [place] (Transfers) minimum assist (75%  patient effort);verbal cues;nonverbal cues (demo/gesture)  -MS       Row Name 12/16/24 1120          Gait/Stairs (Locomotion)    [place] Level (Gait) contact guard  -MS     Distance in Feet (Gait) 200  -MS               User Key  (r) = Recorded By, (t) = Taken By, (c) = Cosigned By      Initials Name Provider Type    MS Ruby Perla, PT, DPT, NCS Physical Therapist                   Obj/Interventions       Row Name 12/16/24 1120          Range of Motion Comprehensive    General Range of Motion bilateral lower extremity ROM WFL  -MS     Comment, General Range of Motion L shoulder flexion impaired 50%-chronic  -MS       Row Name 12/16/24 1120          Strength Comprehensive (MMT)    Comment, General Manual Muscle Testing (MMT) Assessment B LE 4/5  -MS       Row Name 12/16/24 1120          Balance    Balance Assessment sitting static balance;sitting dynamic balance;standing static balance;standing dynamic balance  -MS     Static Sitting Balance contact guard  -MS     Dynamic Sitting Balance contact guard  -MS     Position, Sitting Balance supported;sitting edge of bed  pt leans posteriorly  -MS     Static Standing Balance contact guard  -MS     Dynamic Standing Balance contact guard  -MS     Position/Device Used, Standing Balance unsupported  -MS               User Key  (r) = Recorded By, (t) = Taken By, (c) = Cosigned By      Initials Name Provider Type    MS Ruby Perla, PT, DPT, NCS Physical Therapist                   Goals/Plan       Row Name 12/16/24 1120          Bed Mobility Goal 1 (PT)    Activity/Assistive Device (Bed Mobility Goal 1, PT) bed mobility activities, all  -MS     [place] Level/Cues Needed (Bed Mobility Goal 1, PT) independent  -MS     Time Frame (Bed Mobility Goal 1, PT) long term goal (LTG);by discharge  -MS     Progress/Outcomes (Bed Mobility Goal 1, PT) new goal  -MS       Row Name 12/16/24 1120          Transfer Goal 1 (PT)    Activity/Assistive Device (Transfer Goal 1, PT)  sit-to-stand/stand-to-sit;bed-to-chair/chair-to-bed  -MS     [place] Level/Cues Needed (Transfer Goal 1, PT) independent  -MS     Time Frame (Transfer Goal 1, PT) long term goal (LTG);by discharge  -MS     Progress/Outcome (Transfer Goal 1, PT) new goal  -MS       Row Name 12/16/24 1120          Gait Training Goal 1 (PT)    Activity/Assistive Device (Gait Training Goal 1, PT) gait (walking locomotion);decrease fall risk;improve balance and speed;increase endurance/gait distance  -MS     [place] Level (Gait Training Goal 1, PT) independent  -MS     Distance (Gait Training Goal 1, PT) 150ft  -MS     Time Frame (Gait Training Goal 1, PT) long term goal (LTG);by discharge  -MS     Progress/Outcome (Gait Training Goal 1, PT) new goal  -MS       Row Name 12/16/24 1120          Stairs Goal 1 (PT)    Activity/Assistive Device (Stairs Goal 1, PT) ascending stairs;descending stairs;step-to-step;using handrail, left;using handrail, right  -MS     [place] Level/Cues Needed (Stairs Goal 1, PT) modified independence  -MS     Number of Stairs (Stairs Goal 1, PT) 3  -MS     Time Frame (Stairs Goal 1, PT) long term goal (LTG);by discharge  -MS     Progress/Outcome (Stairs Goal 1, PT) new goal  -MS       Row Name 12/16/24 1120          Therapy Assessment/Plan (PT)    Planned Therapy Interventions (PT) balance training;bed mobility training;gait training;patient/family education;transfer training;stair training  -MS               User Key  (r) = Recorded By, (t) = Taken By, (c) = Cosigned By      Initials Name Provider Type    Ruby Manrique R, PT, DPT, NCS Physical Therapist                   Clinical Impression       Row Name 12/16/24 1120          Pain    Pretreatment Pain Rating 0/10 - no pain  -MS     Posttreatment Pain Rating 0/10 - no pain  -MS       Row Name 12/16/24 1120          Plan of Care Review    Plan of Care Reviewed With patient  -MS     Progress improving  -MS     Outcome Evaluation The patient  presents alert and oriented x4 lying in bed on 1L of supplemental O2. He was able to wean off the 1L for activity maintaining in the low to mid 90's. He reports at baseline he requires assist to stand at times. He required min A for bed mobility and to stand. He was able to ambulate in the hallway maintaining his SpO2 in the low 90's. He will benefit from continued PT to work on activity tolerance, ventilation, and strengthening for mobility. Recommend discharge home with assist and home health to progress to outpt PT.  -MS       Row Name 12/16/24 1120          Therapy Assessment/Plan (PT)    Patient/Family Therapy Goals Statement (PT) go home  -MS     Rehab Potential (PT) good  -MS     Criteria for Skilled Interventions Met (PT) yes;meets criteria;skilled treatment is necessary  -MS     Therapy Frequency (PT) 2 times/day  -MS     Predicted Duration of Therapy Intervention (PT) until discharge  -MS       Row Name 12/16/24 1120          Vital Signs    Pre SpO2 (%) 95  -MS     O2 Delivery Pre Treatment supplemental O2  1L  -MS     O2 Delivery Intra Treatment room air  -MS     Post SpO2 (%) 92  -MS     O2 Delivery Post Treatment room air  -MS     Pre Patient Position Supine  -MS     Intra Patient Position Sitting  -MS     Post Patient Position Sitting  -MS       Row Name 12/16/24 1120          Positioning and Restraints    Post Treatment Position chair  -MS     In Chair sitting;call light within reach;encouraged to call for assist;notified nsg  -MS               User Key  (r) = Recorded By, (t) = Taken By, (c) = Cosigned By      Initials Name Provider Type    MS Ruby Perla R, PT, DPT, NCS Physical Therapist                   Outcome Measures       Row Name 12/16/24 1120          How much help from another person do you currently need...    Turning from your back to your side while in flat bed without using bedrails? 3  -MS     Moving from lying on back to sitting on the side of a flat bed without bedrails? 3  -MS      Moving to and from a bed to a chair (including a wheelchair)? 3  -MS     Standing up from a chair using your arms (e.g., wheelchair, bedside chair)? 3  -MS     Climbing 3-5 steps with a railing? 3  -MS     To walk in hospital room? 3  -MS     AM-PAC 6 Clicks Score (PT) 18  -MS     Highest Level of Mobility Goal 6 --> Walk 10 steps or more  -MS       Row Name 12/16/24 1120          Functional Assessment    Outcome Measure Options AM-PAC 6 Clicks Basic Mobility (PT)  -MS               User Key  (r) = Recorded By, (t) = Taken By, (c) = Cosigned By      Initials Name Provider Type    MS Ruby Perla, PT, DPT, NCS Physical Therapist                                 Physical Therapy Education       Title: PT OT SLP Therapies (In Progress)       Topic: Physical Therapy (In Progress)       Point: Mobility training (Done)       Learning Progress Summary            Patient Acceptance, E, VU by MS at 12/16/2024 1124    Comment: role of PT in his care                      Point: Home exercise program (Not Started)       Learner Progress:  Not documented in this visit.              Point: Body mechanics (Not Started)       Learner Progress:  Not documented in this visit.              Point: Precautions (Not Started)       Learner Progress:  Not documented in this visit.                              User Key       Initials Effective Dates Name Provider Type Discipline    MS 07/11/23 -  Ruby Perla, PT, DPT, NCS Physical Therapist PT                  PT Recommendation and Plan  Planned Therapy Interventions (PT): balance training, bed mobility training, gait training, patient/family education, transfer training, stair training  Progress: improving  Outcome Evaluation: The patient presents alert and oriented x4 lying in bed on 1L of supplemental O2. He was able to wean off the 1L for activity maintaining in the low to mid 90's. He reports at baseline he requires assist to stand at times. He required min A for bed  mobility and to stand. He was able to ambulate in the hallway maintaining his SpO2 in the low 90's. He will benefit from continued PT to work on activity tolerance, ventilation, and strengthening for mobility. Recommend discharge home with assist and home health to progress to outpt PT.     Time Calculation:         PT Charges       Row Name 12/16/24 1120             Time Calculation    Start Time 1120  -MS      Stop Time 1145  -MS      Time Calculation (min) 25 min  -MS      PT Received On 12/16/24  -MS      PT Goal Re-Cert Due Date 12/26/24  -MS         Untimed Charges    PT Eval/Re-eval Minutes 25  -MS         Total Minutes    Untimed Charges Total Minutes 25  -MS       Total Minutes 25  -MS                User Key  (r) = Recorded By, (t) = Taken By, (c) = Cosigned By      Initials Name Provider Type    Ruby Manrique, PT, DPT, NCS Physical Therapist                      PT G-Codes  Outcome Measure Options: AM-PAC 6 Clicks Basic Mobility (PT)  AM-PAC 6 Clicks Score (PT): 18  PT Discharge Summary  Anticipated Discharge Disposition (PT): home with assist, home with home health    Ruby Perla, PT, DPT, NCS  12/16/2024

## 2024-12-16 NOTE — THERAPY TREATMENT NOTE
Acute Care - Speech Language Pathology   Swallow Treatment Note [facility]     Patient Name: Richard NOLAN Jr.  : 1950  MRN: [id number]  Today's Date: 2024               Admit Date: 2024  Pt seen over breakfast to assess tolerance of current diet. Upon arrival, pt upright in bed setting up meal tray. No family present. Pt consumed ground sausage with gravy, scrambled eggs, yogurt, and sips of nectar thick coffee while SLP present. 1x cough following consumption of honey thick liquids and questionable vocal quality change following consumption of yogurt. When discussing observations with pt he stated that he has not been experiencing overt s/s of aspiration at every meal. Education provided on continued use of compensatory strategies of chin tuck and multiple swallows. Pt expressed understanding. No further questions or concerns stated. ST will continue to follow.     Dolores Medina MS CCC-SLP 2024 14:12 CST    Visit Dx:     ICD-10-CM ICD-9-CM   1. Pneumonia due to infectious organism, unspecified laterality, unspecified part of lung  J18.9 486   2. Hypoxia  R09.02 799.02   3. Dysphagia, unspecified type  R13.10 787.20   4. Impaired mobility [Z74.09]  Z74.09 799.89     Patient Active Problem List   Diagnosis    Prostate cancer    Non-smoker    Urinary frequency    Aspirin long-term use    History of radiation therapy    Pharyngoesophageal dysphagia    Hx of colonic polyp    Family hx of colon cancer    Pneumonia     Past Medical History:   Diagnosis Date    Cancer     PROSTATE CANCER     Colon polyps     Cystitis     Depression     Diabetes mellitus     Enlarged prostate     Esophagitis     GERD (gastroesophageal reflux disease)     [place] (hard of hearing)     Hypertension     Kidney stone     OPCD (olivopontocerebellar degeneration)     PONV (postoperative nausea and vomiting)     Skin cancer     Varicose vein of leg      Past Surgical History:   Procedure Laterality Date    COLONOSCOPY N/A  9/19/2022    Procedure: COLONOSCOPY WITH ANESTHESIA;  Surgeon: Adair Beltran MD;  Location:  [facility];  Service: Gastroenterology;  Laterality: N/A;  preop; hx of polyps  postop; polyps   PCP Marcelino Weaver     ENDOSCOPY N/A 9/19/2022    Procedure: ESOPHAGOGASTRODUODENOSCOPY WITH ANESTHESIA;  Surgeon: Adair Beltran MD;  Location:  [facility];  Service: Gastroenterology;  Laterality: N/A;  preop; dysphagia  postop; normal   PCP Marcelino Weaver     ENDOSCOPY AND COLONOSCOPY  11/13/2015    post gastric bypass, dilation, very small internal hemorroid    EYE SURGERY Bilateral     Cataract    GASTRIC BYPASS      NASAL SEPTUM SURGERY      NECK SURGERY      ORIF TIBIA/FIBULA FRACTURES Right     PROSTATE BIOPSY N/A 05/13/2021    Procedure: PROSTATE ULTRASOUND BIOPSY MRI FUSION WITH URONAV;  Surgeon: Gregorio Lange MD;  Location: [facility] OR;  Service: Urology;  Laterality: N/A;    VEIN LIGATION AND STRIPPING         SLP Recommendation and Plan                                               Daily Summary of Progress (SLP): progress toward functional goals as expected (12/16/24 0823)               Treatment Assessment (SLP): continued (12/16/24 0823)  Treatment Assessment Comments (SLP): see note (12/16/24 0823)  Plan for Continued Treatment (SLP): continue treatment per plan of care (12/16/24 0823)         Progress: no change  Outcome Evaluation: see note      SWALLOW EVALUATION (Last 72 Hours)       SLP Adult Swallow Evaluation       Row Name 12/16/24 0823 12/14/24 5571                Rehab Evaluation    Document Type therapy note (daily note)  -JR therapy note (daily note)  -MB       Subjective Information no complaints  -JR no complaints  -MB       Patient Observations alert;cooperative  -JR alert;cooperative  -MB       Patient/Family/Caregiver Comments/Observations no family present  -JR Male and female family members present  -MB       Patient Effort good  -JR good  -MB       Symptoms Noted  During/After Treatment none  -JR --          General Information    Patient Profile Reviewed yes  -JR yes  -MB          Pain    Additional Documentation Pain Scale: FACES Pre/Post-Treatment (Group)  -JR Pain Scale: FACES Pre/Post-Treatment (Group)  -MB          Pain Scale: FACES Pre/Post-Treatment    Pain: FACES Scale, Pretreatment 0-->no hurt  -JR 0-->no hurt  -MB       Posttreatment Pain Rating 0-->no hurt  -JR --          SLP Treatment Clinical Impressions    Treatment Assessment (SLP) continued  -JR continued  -MB       Treatment Assessment Comments (SLP) see note  -JR Continue to follow  -MB       Daily Summary of Progress (SLP) progress toward functional goals as expected  -JR progress towards functional goals is fair  -MB       Barriers to Overall Progress (SLP) Baseline deficits  -JR Baseline deficits  -MB       Plan for Continued Treatment (SLP) continue treatment per plan of care  -JR continue treatment per plan of care  -MB       Care Plan Review evaluation/treatment results reviewed;care plan/treatment goals reviewed;risks/benefits reviewed;current/potential barriers reviewed  -JR --          Swallow Goals (SLP)    Swallow LTGs Swallow Long Term Goal (free text)  -JR --       Swallow STGs diet tolerance goal selection (SLP);swallow management recall goal selection (SLP)  -JR --       Diet Tolerance Goal Selection (SLP) Patient will tolerate trials of  -JR --       Swallow Management Recall Goal Selection (SLP) swallow management recall, SLP goal 1  -JR --          (LTG) Swallow    (LTG) Swallow Pt will tolerate LRD without s/s of aspiration.  -JR Pt will tolerate LRD without s/s of aspiration.  -MB       [place] (Swallow Long Term Goal) independently (over 90% accuracy)  -JR independently (over 90% accuracy)  -MB       Time Frame (Swallow Long Term Goal) by discharge  -JR by discharge  -MB       Barriers (Swallow Long Term Goal) -- n/a  -MB       Progress/Outcomes (Swallow Long Term Goal) continuing  progress toward goal  -JR continuing progress toward goal  -MB          (STG) Patient will tolerate trials of    Consistencies Trialed (Tolerate trials) regular textures;soft to chew (ground) textures;thin liquids;nectar/ mildly thick liquids  -JR regular textures;soft to chew (ground) textures;thin liquids;nectar/ mildly thick liquids  -MB       Desired Outcome (Tolerate trials) without signs/symptoms of aspiration  -JR without signs/symptoms of aspiration  -MB       [place] (Tolerate trials) independently (over 90% accuracy)  -JR independently (over 90% accuracy)  -MB       Time Frame (Tolerate trials) by discharge  -JR by discharge  -MB       Progress/Outcomes (Tolerate trials) continuing progress toward goal  -JR continuing progress toward goal  -MB          (STG) Swallow Management Recall Goal 1 (SLP)    Activity (Swallow Management Recall Goal 1, SLP) independent recall of;aspiration precautions;reflux precautions;fatigue precautions;oral care recommendations;compensatory swallow strategies/techniques;postural techniques;rationale for use of strategies/techniques  -JR independent recall of;aspiration precautions;reflux precautions;fatigue precautions;oral care recommendations;compensatory swallow strategies/techniques;postural techniques;rationale for use of strategies/techniques  -MB       [place]/Accuracy (Swallow Management Recall Goal 1, SLP) independently (over 90% accuracy)  -JR independently (over 90% accuracy)  -MB       Time Frame (Swallow Management Recall Goal 1, SLP) by discharge  -JR by discharge  -MB       Barriers (Swallow Management Recall Goal 1, SLP) none  -JR none  -MB       Progress/Outcomes (Swallow Management Recall Goal 1, SLP) continuing progress toward goal  -JR continuing progress toward goal  -MB                 User Key  (r) = Recorded By, (t) = Taken By, (c) = Cosigned By      Initials Name Effective Dates    Gideon Das, CCC-SLP 02/03/23 -     Dolores Wood MS  CCC-SLP 08/22/23 -                     EDUCATION  The patient has been educated in the following areas:   Dysphagia (Swallowing Impairment).        SLP GOALS       Row Name 12/16/24 0823 12/14/24 1503          (LTG) Swallow    (LTG) Swallow Pt will tolerate LRD without s/s of aspiration.  -JR Pt will tolerate LRD without s/s of aspiration.  -MB     [place] (Swallow Long Term Goal) independently (over 90% accuracy)  -JR independently (over 90% accuracy)  -MB     Time Frame (Swallow Long Term Goal) by discharge  -JR by discharge  -MB     Barriers (Swallow Long Term Goal) -- n/a  -MB     Progress/Outcomes (Swallow Long Term Goal) continuing progress toward goal  -JR continuing progress toward goal  -MB        (STG) Patient will tolerate trials of    Consistencies Trialed (Tolerate trials) regular textures;soft to chew (ground) textures;thin liquids;nectar/ mildly thick liquids  -JR regular textures;soft to chew (ground) textures;thin liquids;nectar/ mildly thick liquids  -MB     Desired Outcome (Tolerate trials) without signs/symptoms of aspiration  -JR without signs/symptoms of aspiration  -MB     [place] (Tolerate trials) independently (over 90% accuracy)  -JR independently (over 90% accuracy)  -MB     Time Frame (Tolerate trials) by discharge  -JR by discharge  -MB     Progress/Outcomes (Tolerate trials) continuing progress toward goal  -JR continuing progress toward goal  -MB        (STG) Swallow Management Recall Goal 1 (SLP)    Activity (Swallow Management Recall Goal 1, SLP) independent recall of;aspiration precautions;reflux precautions;fatigue precautions;oral care recommendations;compensatory swallow strategies/techniques;postural techniques;rationale for use of strategies/techniques  -JR independent recall of;aspiration precautions;reflux precautions;fatigue precautions;oral care recommendations;compensatory swallow strategies/techniques;postural techniques;rationale for use of  strategies/techniques  -MB     [place]/Accuracy (Swallow Management Recall Goal 1, SLP) independently (over 90% accuracy)  -JR independently (over 90% accuracy)  -MB     Time Frame (Swallow Management Recall Goal 1, SLP) by discharge  -JR by discharge  -MB     Barriers (Swallow Management Recall Goal 1, SLP) none  -JR none  -MB     Progress/Outcomes (Swallow Management Recall Goal 1, SLP) continuing progress toward goal  -JR continuing progress toward goal  -MB               User Key  (r) = Recorded By, (t) = Taken By, (c) = Cosigned By      Initials Name Provider Type    Gideon Das, CCC-SLP Speech and Language Pathologist    Dolores Wood MS CCC-SLP Speech and Language Pathologist                         Time Calculation:    Time Calculation- SLP       Row Name 12/16/24 1411             Time Calculation- SLP    SLP Start Time 0823  -JR      SLP Stop Time 0901  -JR      SLP Time Calculation (min) 38 min  -JR      SLP Received On 12/16/24  -JR         Untimed Charges    [id number] Treatment Swallow Minutes 38  -JR         Total Minutes    Untimed Charges Total Minutes 38  -JR       Total Minutes 38  -JR                User Key  (r) = Recorded By, (t) = Taken By, (c) = Cosigned By      Initials Name Provider Type    Dolores Wood MS CCC-SLP Speech and Language Pathologist                    Therapy Charges for Today       Code Description Service Date Service Provider Modifiers Qty    [id number]  ST TREATMENT SWALLOW 3 12/16/2024 Dolores Medina MS CCC-SLP GN 1                 Dolores Medina MS CCC-SLP  12/16/2024

## 2024-12-16 NOTE — PLAN OF CARE
Goal Outcome Evaluation:  Plan of Care Reviewed With: patient        Progress: improving  Outcome Evaluation: The patient presents alert and oriented x4 lying in bed on 1L of supplemental O2. He was able to wean off the 1L for activity maintaining in the low to mid 90's. He reports at baseline he requires assist to stand at times. He required min A for bed mobility and to stand. He was able to ambulate in the hallway maintaining his SpO2 in the low 90's. He will benefit from continued PT to work on activity tolerance, ventilation, and strengthening for mobility. Recommend discharge home with assist and home health to progress to outpt PT.    Anticipated Discharge Disposition (PT): home with assist, home with home health

## 2024-12-16 NOTE — PLAN OF CARE
Goal Outcome Evaluation:  Plan of Care Reviewed With: patient        Progress: no change  Outcome Evaluation: see note                  Treatment Assessment (SLP): continued (12/16/24 0823)  Treatment Assessment Comments (SLP): see note (12/16/24 0823)  Plan for Continued Treatment (SLP): continue treatment per plan of care (12/16/24 0823)

## 2024-12-16 NOTE — PLAN OF CARE
Goal Outcome Evaluation:  Plan of Care Reviewed With: patient        Progress: improving  Outcome Evaluation: Pt A/Ox4, pleasant and cooperative. IV abx given as scheduled. No c/o pain thus far. O2 at 2L. Blood glucose monitored, supplemental insulin given as needed. Safety maintained. Call light in reach.                              [Fatigue] : fatigue [Joint Pain] : joint pain [Muscle Pain] : muscle pain [Insomnia] : insomnia [Anxiety] : anxiety [Negative] : Heme/Lymph [Suicidal] : not suicidal [[email or web address]] : see hpi [de-identified] : see hpi

## 2024-12-17 ENCOUNTER — READMISSION MANAGEMENT (OUTPATIENT)
Dept: CALL CENTER | Facility: HOSPITAL | Age: 74
End: 2024-12-17
Payer: MEDICARE

## 2024-12-17 VITALS
RESPIRATION RATE: 16 BRPM | SYSTOLIC BLOOD PRESSURE: 131 MMHG | TEMPERATURE: 98.5 F | BODY MASS INDEX: 29.32 KG/M2 | WEIGHT: 204.8 LBS | HEIGHT: 70 IN | OXYGEN SATURATION: 94 % | HEART RATE: 92 BPM | DIASTOLIC BLOOD PRESSURE: 63 MMHG

## 2024-12-17 PROBLEM — J18.9 PNEUMONIA, UNSPECIFIED ORGANISM: Status: ACTIVE | Noted: 2024-12-17

## 2024-12-17 LAB
ANION GAP SERPL CALCULATED.3IONS-SCNC: 13 MMOL/L (ref 5–15)
BUN SERPL-MCNC: 16 MG/DL (ref 8–23)
BUN/CREAT SERPL: 31.4 (ref 7–25)
CALCIUM SPEC-SCNC: 9.2 MG/DL (ref 8.6–10.5)
CHLORIDE SERPL-SCNC: 99 MMOL/L (ref 98–107)
CO2 SERPL-SCNC: 28 MMOL/L (ref 22–29)
CREAT SERPL-MCNC: 0.51 MG/DL (ref 0.76–1.27)
DEPRECATED RDW RBC AUTO: 46.7 FL (ref 37–54)
EGFRCR SERPLBLD CKD-EPI 2021: 107.1 ML/MIN/1.73
ERYTHROCYTE [DISTWIDTH] IN BLOOD BY AUTOMATED COUNT: 14.3 % (ref 12.3–15.4)
GLUCOSE BLDC GLUCOMTR-MCNC: 190 MG/DL (ref 70–130)
GLUCOSE BLDC GLUCOMTR-MCNC: 193 MG/DL (ref 70–130)
GLUCOSE SERPL-MCNC: 142 MG/DL (ref 65–99)
HCT VFR BLD AUTO: 45 % (ref 37.5–51)
HGB BLD-MCNC: 13.7 G/DL (ref 13–17.7)
MCH RBC QN AUTO: 27.5 PG (ref 26.6–33)
MCHC RBC AUTO-ENTMCNC: 30.4 G/DL (ref 31.5–35.7)
MCV RBC AUTO: 90.2 FL (ref 79–97)
PLATELET # BLD AUTO: 333 10*3/MM3 (ref 140–450)
PMV BLD AUTO: 9 FL (ref 6–12)
POTASSIUM SERPL-SCNC: 4 MMOL/L (ref 3.5–5.2)
RBC # BLD AUTO: 4.99 10*6/MM3 (ref 4.14–5.8)
SODIUM SERPL-SCNC: 140 MMOL/L (ref 136–145)
WBC NRBC COR # BLD AUTO: 7.69 10*3/MM3 (ref 3.4–10.8)

## 2024-12-17 PROCEDURE — 94799 UNLISTED PULMONARY SVC/PX: CPT

## 2024-12-17 PROCEDURE — 25010000002 ENOXAPARIN PER 10 MG: Performed by: STUDENT IN AN ORGANIZED HEALTH CARE EDUCATION/TRAINING PROGRAM

## 2024-12-17 PROCEDURE — 94664 DEMO&/EVAL PT USE INHALER: CPT

## 2024-12-17 PROCEDURE — 97535 SELF CARE MNGMENT TRAINING: CPT

## 2024-12-17 PROCEDURE — 85027 COMPLETE CBC AUTOMATED: CPT | Performed by: STUDENT IN AN ORGANIZED HEALTH CARE EDUCATION/TRAINING PROGRAM

## 2024-12-17 PROCEDURE — 82948 REAGENT STRIP/BLOOD GLUCOSE: CPT

## 2024-12-17 PROCEDURE — 94761 N-INVAS EAR/PLS OXIMETRY MLT: CPT

## 2024-12-17 PROCEDURE — 25010000002 AMPICILLIN-SULBACTAM PER 1.5 G: Performed by: STUDENT IN AN ORGANIZED HEALTH CARE EDUCATION/TRAINING PROGRAM

## 2024-12-17 PROCEDURE — 80048 BASIC METABOLIC PNL TOTAL CA: CPT | Performed by: STUDENT IN AN ORGANIZED HEALTH CARE EDUCATION/TRAINING PROGRAM

## 2024-12-17 PROCEDURE — 63710000001 PREDNISONE PER 1 MG: Performed by: STUDENT IN AN ORGANIZED HEALTH CARE EDUCATION/TRAINING PROGRAM

## 2024-12-17 PROCEDURE — 63710000001 INSULIN LISPRO (HUMAN) PER 5 UNITS

## 2024-12-17 RX ORDER — INSULIN GLARGINE 100 [IU]/ML
20 INJECTION, SOLUTION SUBCUTANEOUS EVERY MORNING
Start: 2024-12-17

## 2024-12-17 RX ORDER — INSULIN GLARGINE 100 [IU]/ML
10 INJECTION, SOLUTION SUBCUTANEOUS NIGHTLY
Start: 2024-12-17

## 2024-12-17 RX ORDER — PREDNISONE 20 MG/1
10 TABLET ORAL DAILY
Qty: 5 TABLET | Refills: 0 | Status: SHIPPED | OUTPATIENT
Start: 2024-12-17

## 2024-12-17 RX ADMIN — LISINOPRIL 10 MG: 10 TABLET ORAL at 08:42

## 2024-12-17 RX ADMIN — AMPICILLIN SODIUM, SULBACTAM SODIUM 3 G: 2; 1 INJECTION, POWDER, FOR SOLUTION INTRAMUSCULAR; INTRAVENOUS at 08:44

## 2024-12-17 RX ADMIN — ENOXAPARIN SODIUM 40 MG: 100 INJECTION SUBCUTANEOUS at 08:43

## 2024-12-17 RX ADMIN — INSULIN LISPRO 2 UNITS: 100 INJECTION, SOLUTION INTRAVENOUS; SUBCUTANEOUS at 12:32

## 2024-12-17 RX ADMIN — AMPICILLIN SODIUM, SULBACTAM SODIUM 3 G: 2; 1 INJECTION, POWDER, FOR SOLUTION INTRAMUSCULAR; INTRAVENOUS at 03:10

## 2024-12-17 RX ADMIN — LINAGLIPTIN 5 MG: 5 TABLET, FILM COATED ORAL at 08:42

## 2024-12-17 RX ADMIN — LEVOTHYROXINE SODIUM 25 MCG: 0.03 TABLET ORAL at 06:12

## 2024-12-17 RX ADMIN — INSULIN LISPRO 2 UNITS: 100 INJECTION, SOLUTION INTRAVENOUS; SUBCUTANEOUS at 08:43

## 2024-12-17 RX ADMIN — PANTOPRAZOLE SODIUM 40 MG: 40 TABLET, DELAYED RELEASE ORAL at 06:12

## 2024-12-17 RX ADMIN — METFORMIN HYDROCHLORIDE 500 MG: 500 TABLET, FILM COATED ORAL at 08:43

## 2024-12-17 RX ADMIN — ASPIRIN 81 MG: 81 TABLET, COATED ORAL at 08:42

## 2024-12-17 RX ADMIN — PREDNISONE 20 MG: 20 TABLET ORAL at 08:43

## 2024-12-17 RX ADMIN — Medication 10 ML: at 08:44

## 2024-12-17 RX ADMIN — IPRATROPIUM BROMIDE AND ALBUTEROL SULFATE 3 ML: 2.5; .5 SOLUTION RESPIRATORY (INHALATION) at 12:47

## 2024-12-17 RX ADMIN — FLUOXETINE HYDROCHLORIDE 20 MG: 20 CAPSULE ORAL at 08:42

## 2024-12-17 RX ADMIN — FAMOTIDINE 20 MG: 20 TABLET, FILM COATED ORAL at 08:43

## 2024-12-17 RX ADMIN — IPRATROPIUM BROMIDE AND ALBUTEROL SULFATE 3 ML: 2.5; .5 SOLUTION RESPIRATORY (INHALATION) at 06:04

## 2024-12-17 RX ADMIN — IPRATROPIUM BROMIDE AND ALBUTEROL SULFATE 3 ML: 2.5; .5 SOLUTION RESPIRATORY (INHALATION) at 00:19

## 2024-12-17 RX ADMIN — EMPAGLIFLOZIN 25 MG: 25 TABLET, FILM COATED ORAL at 08:42

## 2024-12-17 NOTE — THERAPY TREATMENT NOTE
Acute Care - Speech Language Pathology   Swallow Treatment Note [facility]     Patient Name: Richard NOLAN Jr.  : 1950  MRN: [id number]  Today's Date: 2024               Admit Date: 2024  Pt is discharging home today. SLP provided education to pt and wife about thickening liquids at home and following a soft solid diet. They were given handouts on both thickened liquids and a soft diet. SLP also provided a honey thick gel thickener starter kit.   Gideon Mcelroy CCC-SLP 2024 14:06 CST    Visit Dx:     ICD-10-CM ICD-9-CM   1. Pneumonia due to infectious organism, unspecified laterality, unspecified part of lung  J18.9 486   2. Hypoxia  R09.02 799.02   3. Dysphagia, unspecified type  R13.10 787.20   4. Impaired mobility [Z74.09]  Z74.09 799.89     Patient Active Problem List   Diagnosis    Prostate cancer    Non-smoker    Urinary frequency    Aspirin long-term use    History of radiation therapy    Pharyngoesophageal dysphagia    Hx of colonic polyp    Family hx of colon cancer    Pneumonia    Pneumonia, unspecified organism     Past Medical History:   Diagnosis Date    Cancer     PROSTATE CANCER     Colon polyps     Cystitis     Depression     Diabetes mellitus     Enlarged prostate     Esophagitis     GERD (gastroesophageal reflux disease)     [place] (hard of hearing)     Hypertension     Kidney stone     OPCD (olivopontocerebellar degeneration)     PONV (postoperative nausea and vomiting)     Skin cancer     Varicose vein of leg      Past Surgical History:   Procedure Laterality Date    COLONOSCOPY N/A 2022    Procedure: COLONOSCOPY WITH ANESTHESIA;  Surgeon: Adair Beltran MD;  Location: [facility] ENDOSCOPY;  Service: Gastroenterology;  Laterality: N/A;  preop; hx of polyps  postop; polyps   PCP Marcelino Weaver     ENDOSCOPY N/A 2022    Procedure: ESOPHAGOGASTRODUODENOSCOPY WITH ANESTHESIA;  Surgeon: Adair Beltran MD;  Location: [facility] ENDOSCOPY;  Service: Gastroenterology;   Laterality: N/A;  preop; dysphagia  postop; normal   PCP Marcelino Weaver     ENDOSCOPY AND COLONOSCOPY  11/13/2015    post gastric bypass, dilation, very small internal hemorroid    EYE SURGERY Bilateral     Cataract    GASTRIC BYPASS      NASAL SEPTUM SURGERY      NECK SURGERY      ORIF TIBIA/FIBULA FRACTURES Right     PROSTATE BIOPSY N/A 05/13/2021    Procedure: PROSTATE ULTRASOUND BIOPSY MRI FUSION WITH URONAV;  Surgeon: Gregorio Lange MD;  Location: [facility] OR;  Service: Urology;  Laterality: N/A;    VEIN LIGATION AND STRIPPING         SLP Recommendation and Plan                                               Daily Summary of Progress (SLP): progress toward functional goals as expected (12/17/24 1349)               Treatment Assessment (SLP): continued (12/17/24 1349)  Treatment Assessment Comments (SLP): Continue to follow (12/17/24 1349)  Plan for Continued Treatment (SLP): continue treatment per plan of care (12/17/24 1349)         Progress: no change  Outcome Evaluation: See note      SWALLOW EVALUATION (Last 72 Hours)       SLP Adult Swallow Evaluation       Row Name 12/17/24 1349 12/16/24 0823 12/14/24 1503             Rehab Evaluation    Document Type therapy note (daily note)  -MB therapy note (daily note)  -JR therapy note (daily note)  -MB      Subjective Information no complaints  -MB no complaints  -JR no complaints  -MB      Patient Observations alert;cooperative  -MB alert;cooperative  -JR alert;cooperative  -MB      Patient/Family/Caregiver Comments/Observations Wife present  -MB no family present  -JR Male and female family members present  -MB      Patient Effort good  -MB good  -JR good  -MB      Symptoms Noted During/After Treatment -- none  -JR --         General Information    Patient Profile Reviewed -- yes  -JR yes  -MB         Pain    Additional Documentation Pain Scale: FACES Pre/Post-Treatment (Group)  -MB Pain Scale: FACES Pre/Post-Treatment (Group)  -JR Pain Scale: FACES  Pre/Post-Treatment (Group)  -MB         Pain Scale: FACES Pre/Post-Treatment    Pain: FACES Scale, Pretreatment 0-->no hurt  -MB 0-->no hurt  -JR 0-->no hurt  -MB      Posttreatment Pain Rating -- 0-->no hurt  -JR --         SLP Treatment Clinical Impressions    Treatment Assessment (SLP) continued  -MB continued  -JR continued  -MB      Treatment Assessment Comments (SLP) Continue to follow  -MB see note  -JR Continue to follow  -MB      Daily Summary of Progress (SLP) progress toward functional goals as expected  -MB progress toward functional goals as expected  -JR progress towards functional goals is fair  -MB      Barriers to Overall Progress (SLP) Baseline deficits  -MB Baseline deficits  -JR Baseline deficits  -MB      Plan for Continued Treatment (SLP) continue treatment per plan of care  -MB continue treatment per plan of care  -JR continue treatment per plan of care  -MB      Care Plan Review -- evaluation/treatment results reviewed;care plan/treatment goals reviewed;risks/benefits reviewed;current/potential barriers reviewed  -JR --         Swallow Goals (SLP)    Swallow LTGs -- Swallow Long Term Goal (free text)  -JR --      Swallow STGs -- diet tolerance goal selection (SLP);swallow management recall goal selection (SLP)  -JR --      Diet Tolerance Goal Selection (SLP) -- Patient will tolerate trials of  -JR --      Swallow Management Recall Goal Selection (SLP) -- swallow management recall, SLP goal 1  -JR --         (LTG) Swallow    (LTG) Swallow Pt will tolerate LRD without s/s of aspiration.  -MB Pt will tolerate LRD without s/s of aspiration.  -JR Pt will tolerate LRD without s/s of aspiration.  -MB      [place] (Swallow Long Term Goal) independently (over 90% accuracy)  -MB independently (over 90% accuracy)  -JR independently (over 90% accuracy)  -MB      Time Frame (Swallow Long Term Goal) by discharge  -MB by discharge  -JR by discharge  -MB      Barriers (Swallow Long Term Goal) -- -- n/a   -MB      Progress/Outcomes (Swallow Long Term Goal) -- continuing progress toward goal  -JR continuing progress toward goal  -MB         (STG) Patient will tolerate trials of    Consistencies Trialed (Tolerate trials) regular textures;soft to chew (ground) textures;thin liquids;nectar/ mildly thick liquids  -MB regular textures;soft to chew (ground) textures;thin liquids;nectar/ mildly thick liquids  -JR regular textures;soft to chew (ground) textures;thin liquids;nectar/ mildly thick liquids  -MB      Desired Outcome (Tolerate trials) without signs/symptoms of aspiration  -MB without signs/symptoms of aspiration  -JR without signs/symptoms of aspiration  -MB      [place] (Tolerate trials) independently (over 90% accuracy)  -MB independently (over 90% accuracy)  -JR independently (over 90% accuracy)  -MB      Time Frame (Tolerate trials) by discharge  -MB by discharge  -JR by discharge  -MB      Progress/Outcomes (Tolerate trials) -- continuing progress toward goal  -JR continuing progress toward goal  -MB         (STG) Swallow Management Recall Goal 1 (SLP)    Activity (Swallow Management Recall Goal 1, SLP) independent recall of;aspiration precautions;reflux precautions;fatigue precautions;oral care recommendations;compensatory swallow strategies/techniques;postural techniques;rationale for use of strategies/techniques  -MB independent recall of;aspiration precautions;reflux precautions;fatigue precautions;oral care recommendations;compensatory swallow strategies/techniques;postural techniques;rationale for use of strategies/techniques  -JR independent recall of;aspiration precautions;reflux precautions;fatigue precautions;oral care recommendations;compensatory swallow strategies/techniques;postural techniques;rationale for use of strategies/techniques  -MB      [place]/Accuracy (Swallow Management Recall Goal 1, SLP) independently (over 90% accuracy)  -MB independently (over 90% accuracy)  -JR  independently (over 90% accuracy)  -MB      Time Frame (Swallow Management Recall Goal 1, SLP) by discharge  -MB by discharge  -JR by discharge  -MB      Barriers (Swallow Management Recall Goal 1, SLP) -- none  -JR none  -MB      Progress/Outcomes (Swallow Management Recall Goal 1, SLP) -- continuing progress toward goal  -JR continuing progress toward goal  -MB                User Key  (r) = Recorded By, (t) = Taken By, (c) = Cosigned By      Initials Name Effective Dates    Gideon Das, CCC-SLP 02/03/23 -     Dolores Wood MS CCC-SLP 08/22/23 -                     EDUCATION  The patient has been educated in the following areas:   Dysphagia (Swallowing Impairment).        SLP GOALS       Row Name 12/17/24 1349 12/16/24 0823 12/14/24 1503       (LTG) Swallow    (LTG) Swallow Pt will tolerate LRD without s/s of aspiration.  -MB Pt will tolerate LRD without s/s of aspiration.  -JR Pt will tolerate LRD without s/s of aspiration.  -MB    [place] (Swallow Long Term Goal) independently (over 90% accuracy)  -MB independently (over 90% accuracy)  -JR independently (over 90% accuracy)  -MB    Time Frame (Swallow Long Term Goal) by discharge  -MB by discharge  -JR by discharge  -MB    Barriers (Swallow Long Term Goal) -- -- n/a  -MB    Progress/Outcomes (Swallow Long Term Goal) -- continuing progress toward goal  -JR continuing progress toward goal  -MB       (STG) Patient will tolerate trials of    Consistencies Trialed (Tolerate trials) regular textures;soft to chew (ground) textures;thin liquids;nectar/ mildly thick liquids  -MB regular textures;soft to chew (ground) textures;thin liquids;nectar/ mildly thick liquids  -JR regular textures;soft to chew (ground) textures;thin liquids;nectar/ mildly thick liquids  -MB    Desired Outcome (Tolerate trials) without signs/symptoms of aspiration  -MB without signs/symptoms of aspiration  -JR without signs/symptoms of aspiration  -MB    [place] (Tolerate  trials) independently (over 90% accuracy)  -MB independently (over 90% accuracy)  -JR independently (over 90% accuracy)  -MB    Time Frame (Tolerate trials) by discharge  -MB by discharge  -JR by discharge  -MB    Progress/Outcomes (Tolerate trials) -- continuing progress toward goal  -JR continuing progress toward goal  -MB       (STG) Swallow Management Recall Goal 1 (SLP)    Activity (Swallow Management Recall Goal 1, SLP) independent recall of;aspiration precautions;reflux precautions;fatigue precautions;oral care recommendations;compensatory swallow strategies/techniques;postural techniques;rationale for use of strategies/techniques  -MB independent recall of;aspiration precautions;reflux precautions;fatigue precautions;oral care recommendations;compensatory swallow strategies/techniques;postural techniques;rationale for use of strategies/techniques  -JR independent recall of;aspiration precautions;reflux precautions;fatigue precautions;oral care recommendations;compensatory swallow strategies/techniques;postural techniques;rationale for use of strategies/techniques  -MB    Allen/Accuracy (Swallow Management Recall Goal 1, SLP) independently (over 90% accuracy)  -MB independently (over 90% accuracy)  -JR independently (over 90% accuracy)  -MB    Time Frame (Swallow Management Recall Goal 1, SLP) by discharge  -MB by discharge  -JR by discharge  -MB    Barriers (Swallow Management Recall Goal 1, SLP) -- none  -JR none  -MB    Progress/Outcomes (Swallow Management Recall Goal 1, SLP) -- continuing progress toward goal  -JR continuing progress toward goal  -MB              User Key  (r) = Recorded By, (t) = Taken By, (c) = Cosigned By      Initials Name Provider Type    Gideon Das CCC-SLP Speech and Language Pathologist    Dolores Wood, MS CCC-SLP Speech and Language Pathologist                         Time Calculation:    Time Calculation- SLP       Row Name 12/17/24 1406             Time  Calculation- SLP    SLP Start Time 1349  -MB      SLP Stop Time 1406  -MB      SLP Time Calculation (min) 17 min  -MB      SLP Received On 12/17/24  -MB         Timed Charges    61158-RR Selfcare Mgmt Minutes 17  -MB         Total Minutes    Timed Charges Total Minutes 17  -MB       Total Minutes 17  -MB                User Key  (r) = Recorded By, (t) = Taken By, (c) = Cosigned By      Initials Name Provider Type    Gideon Das CCC-SLP Speech and Language Pathologist                    Therapy Charges for Today       Code Description Service Date Service Provider Modifiers Qty    [id number]  ST SELF CARE/MGMT/TRAIN EA 15 MIN 12/17/2024 Gideon Mcelroy CCC-SLP GN 1                 JUSTIN Millard  12/17/2024

## 2024-12-17 NOTE — PLAN OF CARE
Goal Outcome Evaluation:      Pt Aox4. VSS on RA. Up standby to brm/chair. DC education provided, understanding verbalized. Pt DC home with spouse. IV removed per orders.

## 2024-12-17 NOTE — THERAPY DISCHARGE NOTE
Acute Care - Physical Therapy Discharge Summary  [facility]       Patient Name: Richard NOLAN Jr.  : 1950  MRN: [id number]    Today's Date: 2024                 Admit Date: 2024      PT Recommendation and Plan    Visit Dx:    ICD-10-CM ICD-9-CM   1. Pneumonia due to infectious organism, unspecified laterality, unspecified part of lung  J18.9 486   2. Hypoxia  R09.02 799.02   3. Dysphagia, unspecified type  R13.10 787.20   4. Impaired mobility [Z74.09]  Z74.09 799.89                PT Rehab Goals       Row Name 24 1609             Bed Mobility Goal 1 (PT)    Activity/Assistive Device (Bed Mobility Goal 1, PT) bed mobility activities, all  -GARRY      [place] Level/Cues Needed (Bed Mobility Goal 1, PT) independent  -GARRY      Time Frame (Bed Mobility Goal 1, PT) long term goal (LTG);by discharge  -GARRY      Progress/Outcomes (Bed Mobility Goal 1, PT) goal not met  -GARRY         Transfer Goal 1 (PT)    Activity/Assistive Device (Transfer Goal 1, PT) sit-to-stand/stand-to-sit;bed-to-chair/chair-to-bed  -GARRY      [place] Level/Cues Needed (Transfer Goal 1, PT) independent  -GARRY      Time Frame (Transfer Goal 1, PT) long term goal (LTG);by discharge  -GARRY      Progress/Outcome (Transfer Goal 1, PT) goal not met  -GARRY         Gait Training Goal 1 (PT)    Activity/Assistive Device (Gait Training Goal 1, PT) gait (walking locomotion);decrease fall risk;improve balance and speed;increase endurance/gait distance  -GARRY      [place] Level (Gait Training Goal 1, PT) independent  -GARRY      Distance (Gait Training Goal 1, PT) 150ft  -GARRY      Time Frame (Gait Training Goal 1, PT) long term goal (LTG);by discharge  -GARRY      Progress/Outcome (Gait Training Goal 1, PT) goal not met  -GARRY         Stairs Goal 1 (PT)    Activity/Assistive Device (Stairs Goal 1, PT) ascending stairs;descending stairs;step-to-step;using handrail, left;using handrail, right  -GARRY      [place] Level/Cues Needed (Stairs Goal 1, PT)  modified independence  -GARRY      Number of Stairs (Stairs Goal 1, PT) 3  -GARRY      Time Frame (Stairs Goal 1, PT) long term goal (LTG);by discharge  -GARRY      Progress/Outcome (Stairs Goal 1, PT) goal not met  -GARRY                User Key  (r) = Recorded By, (t) = Taken By, (c) = Cosigned By      Initials Name Provider Type Discipline    Arnaud Myers, PTA Physical Therapist Assistant PT                        PT Discharge Summary  Anticipated Discharge Disposition (PT): home  Reason for Discharge: Discharge from facility  Outcomes Achieved: Refer to plan of care for updates on goals achieved  Discharge Destination: Home      Arnaud Marks PTA   12/17/2024

## 2024-12-17 NOTE — THERAPY DISCHARGE NOTE
Acute Care - Speech Language Pathology Discharge Summary  [facility]       Patient Name: Richard NOLAN Jr.  : 1950  MRN: [id number]    Today's Date: 2024                   Admit Date: 2024      SLP Recommendation and Plan  Soft solids and honey thick liquids    Visit Dx:    ICD-10-CM ICD-9-CM   1. Pneumonia due to infectious organism, unspecified laterality, unspecified part of lung  J18.9 486   2. Hypoxia  R09.02 799.02   3. Dysphagia, unspecified type  R13.10 787.20   4. Impaired mobility [Z74.09]  Z74.09 799.89          Time Calculation- SLP       Row Name 24 1406             Time Calculation- SLP    SLP Start Time 1349  -MB      SLP Stop Time 1406  -MB      SLP Time Calculation (min) 17 min  -MB      SLP Received On 24  -MB         Timed Charges    18641-ER Selfcare Mgmt Minutes 17  -MB         Total Minutes    Timed Charges Total Minutes 17  -MB       Total Minutes 17  -MB                User Key  (r) = Recorded By, (t) = Taken By, (c) = Cosigned By      Initials Name Provider Type    Gideon Das CCC-SLP Speech and Language Pathologist                       SLP GOALS       Row Name 24 1500 24 1349 24 0823       (LTG) Swallow    (LTG) Swallow Pt will tolerate LRD without s/s of aspiration.  -MB Pt will tolerate LRD without s/s of aspiration.  -MB Pt will tolerate LRD without s/s of aspiration.  -JR    [place] (Swallow Long Term Goal) independently (over 90% accuracy)  -MB independently (over 90% accuracy)  -MB independently (over 90% accuracy)  -JR    Time Frame (Swallow Long Term Goal) by discharge  -MB by discharge  -MB by discharge  -JR    Barriers (Swallow Long Term Goal) n/a  -MB -- --    Progress/Outcomes (Swallow Long Term Goal) goal partially met  -MB -- continuing progress toward goal  -JR       (STG) Patient will tolerate trials of    Consistencies Trialed (Tolerate trials) regular textures;soft to chew (ground) textures;thin  liquids;nectar/ mildly thick liquids  -MB regular textures;soft to chew (ground) textures;thin liquids;nectar/ mildly thick liquids  -MB regular textures;soft to chew (ground) textures;thin liquids;nectar/ mildly thick liquids  -JR    Desired Outcome (Tolerate trials) without signs/symptoms of aspiration  -MB without signs/symptoms of aspiration  -MB without signs/symptoms of aspiration  -JR    [place] (Tolerate trials) independently (over 90% accuracy)  -MB independently (over 90% accuracy)  -MB independently (over 90% accuracy)  -JR    Time Frame (Tolerate trials) by discharge  -MB by discharge  -MB by discharge  -JR    Progress/Outcomes (Tolerate trials) goal partially met  -MB -- continuing progress toward goal  -JR       ([facility] [place]) Swallow Management Recall Goal 1 (SLP)    Activity (Swallow Management Recall Goal 1, SLP) independent recall of;aspiration precautions;reflux precautions;fatigue precautions;oral care recommendations;compensatory swallow strategies/techniques;postural techniques;rationale for use of strategies/techniques  -MB independent recall of;aspiration precautions;reflux precautions;fatigue precautions;oral care recommendations;compensatory swallow strategies/techniques;postural techniques;rationale for use of strategies/techniques  -MB independent recall of;aspiration precautions;reflux precautions;fatigue precautions;oral care recommendations;compensatory swallow strategies/techniques;postural techniques;rationale for use of strategies/techniques  -JR    [place]/Accuracy (Swallow Management Recall Goal 1, SLP) independently (over 90% accuracy)  -MB independently (over 90% accuracy)  -MB independently (over 90% accuracy)  -JR    Time Frame (Swallow Management Recall Goal 1, SLP) by discharge  -MB by discharge  -MB by discharge  -JR    Barriers (Swallow Management Recall Goal 1, SLP) none  -MB -- none  -JR    Progress/Outcomes (Swallow Management Recall Goal 1, SLP) goal partially met   -MB -- continuing progress toward goal  -              User Key  (r) = Recorded By, (t) = Taken By, (c) = Cosigned By      Initials Name Provider Type    Gideon Das, CCC-SLP Speech and Language Pathologist    Dolores Wood MS CCC-SLP Speech and Language Pathologist                    SLPCHARGES@      SLP Discharge Summary  Anticipated Discharge Disposition (SLP): home with home health  Reason for Discharge: discharge from this facility  Progress Toward Achieving Short/long Term Goals: goals partially met within established timelines  Discharge Destination: home      Gideon Mcelroy CCC-SLP  12/17/2024

## 2024-12-17 NOTE — PLAN OF CARE
Goal Outcome Evaluation:  Plan of Care Reviewed With: patient        Progress: improving  Outcome Evaluation: Pt A/Ox4. Up x1 assist to bathroom. BM this shift. O2 placed on pt at 1L while sleeping to maintain sats >90%. Currently on RA. Blood glucose monitored, supplemental insulin given as needed. IV abx given as scheduled. Safety maintained. Call light in reach.

## 2024-12-17 NOTE — DISCHARGE SUMMARY
Hospital Discharge Summary    Richard NOLAN Jr.  :  1950  MRN:  [id number]    Admit date:  2024  Discharge date:  2024    Admitting Physician:    Marcelino Weaver MD    Discharge Diagnoses:      Pneumonia      Hospital Course:       74-year-old male with past medical history of type 2 diabetes, hypertension, and chronic dysphagia who presents after aspiration event with aspiration pneumonitis and pneumonia.  He was admitted and underwent pulmonary toilet, completed antibiotic therapy with 5-day course of Unasyn, and was given steroids.  He subsequently improved and will be discharged on a short prednisone course.  He will not be on antibiotics because he already completed antibiotic therapy while in the hospital.        Discharge Medications:         Discharge Medications        ASK your doctor about these medications        Instructions Start Date   aspirin 81 MG EC tablet   81 mg, Oral, Daily      diclofenac 75 MG EC tablet  Commonly known as: VOLTAREN   75 mg, Oral, 2 Times Daily      diphenhydrAMINE-acetaminophen  MG tablet per tablet  Commonly known as: TYLENOL PM   1 tablet, Oral, Nightly PRN      empagliflozin 10 MG tablet tablet  Commonly known as: JARDIANCE   10 mg, Oral, Daily      FLUoxetine 20 MG capsule  Commonly known as: PROzac   1 capsule, Oral, Daily      insulin glargine 100 UNIT/ML injection  Commonly known as: LANTUS, SEMGLEE   15 Units, 2 Times Daily      insulin glargine 100 UNIT/ML injection  Commonly known as: LANTUS, SEMGLEE   15 Units, 2 Times Daily      insulin lispro 100 UNIT/ML injection  Commonly known as: humaLOG   3 Times Daily Before Meals      Janumet  MG per tablet  Generic drug: sitaGLIPtin-metFORMIN   1 tablet, Oral, 2 Times Daily      lisinopril 10 MG tablet  Commonly known as: PRINIVIL,ZESTRIL   1 tablet, Oral, Daily      omeprazole 40 MG capsule  Commonly known as: priLOSEC  Ask about: Which instructions should I use?   40 mg, Oral,  Daily      Pepcid 20 MG tablet  Generic drug: famotidine   Take 1 tablet by mouth 2 (Two) Times a Day.             Significant Diagnostic Studies:      FL Video Swallow With Speech Single Contrast    Result Date: 12/13/2024  1. 1 episode of penetration occurred with thin liquid barium. 2. Please see separate Speech Therapy report for rehabilitation swallow recommendations and procedural details.  This report was signed and finalized on 12/13/2024 10:10 AM by Dr. Pan Wilkes MD.      CT Angiogram Chest    Result Date: 12/13/2024  1. No evidence of an acute pulmonary embolism. 2. Atelectatic changes in the lower lungs bilaterally. A probable infiltrate/aspiration pneumonitis in the lower lungs, left more than the right. 3. Moderately dilated esophagus with fluid level may suggest gastroesophageal reflux?. 4. Cholelithiasis. No finding to suggest cholecystitis.  The above study was initially reviewed and reported by [facility]. I do not find any discrepancies.               This report was signed and finalized on 12/13/2024 9:28 AM by Dr. Floyd Lewis MD.      XR Chest 1 View    Result Date: 12/12/2024   Mild right basilar bandlike parenchymal opacities, favor atelectasis.    This report was signed and finalized on 12/12/2024 7:44 PM by Omari Posadas.            Disposition:   Home  Follow up with Marcelino Weaver MD in 1 weeks.    Signed:  Alcides Stein MD   12/17/2024, 07:28 CST

## 2024-12-18 ENCOUNTER — HOME HEALTH ADMISSION (OUTPATIENT)
Dept: HOME HEALTH SERVICES | Facility: HOME HEALTHCARE | Age: 74
End: 2024-12-18
Payer: MEDICARE

## 2024-12-18 ENCOUNTER — TRANSCRIBE ORDERS (OUTPATIENT)
Dept: HOME HEALTH SERVICES | Facility: HOME HEALTHCARE | Age: 74
End: 2024-12-18
Payer: MEDICARE

## 2024-12-18 DIAGNOSIS — E11.69 TYPE 2 DIABETES MELLITUS WITH OTHER SPECIFIED COMPLICATION, UNSPECIFIED WHETHER LONG TERM INSULIN USE: Primary | ICD-10-CM

## 2024-12-18 LAB
BACTERIA SPEC RESP CULT: NORMAL
GRAM STN SPEC: NORMAL

## 2024-12-18 NOTE — OUTREACH NOTE
Prep Survey      Flowsheet Row Responses   [facility] patient discharged from? [place]   Is LACE score < 7 ? No   Eligibility Readm Mgmt   Discharge diagnosis Pneumonia   Does the patient have one of the following disease processes/diagnoses(primary or secondary)? Pneumonia   Does the patient have Home health ordered? No   Is there a DME ordered? No   Prep survey completed? Yes            WILTON AMOR - Registered Nurse

## 2024-12-19 ENCOUNTER — HOME CARE VISIT (OUTPATIENT)
Dept: HOME HEALTH SERVICES | Facility: CLINIC | Age: 74
End: 2024-12-19
Payer: MEDICARE

## 2024-12-19 VITALS
RESPIRATION RATE: 22 BRPM | TEMPERATURE: 97.2 F | WEIGHT: 204 LBS | SYSTOLIC BLOOD PRESSURE: 124 MMHG | OXYGEN SATURATION: 91 % | BODY MASS INDEX: 29.2 KG/M2 | HEART RATE: 72 BPM | DIASTOLIC BLOOD PRESSURE: 70 MMHG | HEIGHT: 70 IN

## 2024-12-19 PROCEDURE — G0299 HHS/HOSPICE OF RN EA 15 MIN: HCPCS

## 2024-12-19 NOTE — Clinical Note
SOC Note: Patient is a 74 year old male who has previous history of dysphagia and gastrointestional reflux disease which caused patient to aspirate and was diagnosed with aspiration pneumonia. Patient was inpatient in hospital then discharged home after completion of IV antibiotics. [facility] 83% on room air and patient was instructed on coughing and pursed lip breathing with recovery of SP02 to 91% at that time. Patient caregiver states that patient has recently underwent an overnight oxemitry and is waiting for orders for home oxygen. Caregiver is a retired respiratory therapist and was instructed on when to call for emergent care if patient is unable to recover after rest, coughing and deep breathing exercises to raise SP02. Patient is also recently had liquids changed to nectar thick liquids.     Home Health ordered for: disciplines Skilled nursing for instruction of breath management and instruction on disease process. PT for strengthening and balance and SLP for swallowing techniques and breath work.     Reason for Hosp/Primary Dx/Co-morbidities: Patient with previous medical history of GERD, dysphagia, history of falls, type II diabetes, and pneumonia    Focus of Care: Pneumonia    Patient's goal(s):Get stronger and breath better    Current Functional status/mobility/DME: Patient is moderate to maximum assistance with adls and iadls     HB status/Living Arrangements: Patient lives in a single level home with spouse who is patients primary caregiver    Skin Integrity/wound status: No wounds    Code Status: full code    Fall Risk/Safety concerns: Patient is high risk for falls related to poor mobility and weakness    Educated on Emergency Plan, steps to take prior to going to the ER and when to Call Home Health First:  Instructed patient and caregiver on importance of emergency care plan and when to call for emergent care with understanding verbalized.     Medication  issues/Concerns: Waiting for order for home 02 to keep SP02 above 89%    Additional Problems/Concerns: NA    SDOH Barriers (i.e. caregiver concerns, social isolation, transportation, food insecurity, environment, income etc.)/Need for MSW: NA

## 2024-12-22 NOTE — HOME HEALTH
SOC Note: Patient is a 74 year old male who has previous history of dysphagia and gastrointestional reflux disease which caused patient to aspirate and was diagnosed with aspiration pneumonia. Patient was inpatient in hospital then discharged home after completion of IV antibiotics. [facility] 83% on room air and patient was instructed on coughing and pursed lip breathing with recovery of SP02 to 91% at that time. Patient caregiver states that patient has recently underwent an overnight oxemitry and is waiting for orders for home oxygen. Caregiver is a retired respiratory therapist and was instructed on when to call for emergent care if patient is unable to recover after rest, coughing and deep breathing exercises to raise SP02. Patient is also recently had liquids changed to nectar thick liquids.     Home Health ordered for: disciplines Skilled nursing for instruction of breath management and instruction on disease process. PT for strengthening and balance and SLP for swallowing techniques and breath work.     Reason for Hosp/Primary Dx/Co-morbidities: Patient with previous medical history of GERD, dysphagia, history of falls, type II diabetes, and pneumonia    Focus of Care: Pneumonia    Patient's goal(s):Get stronger and breath better    Current Functional status/mobility/DME: Patient is moderate to maximum assistance with adls and iadls     HB status/Living Arrangements: Patient lives in a single level home with spouse who is patients primary caregiver    Skin Integrity/wound status: No wounds    Code Status: full code    Fall Risk/Safety concerns: Patient is high risk for falls related to poor mobility and weakness    Educated on Emergency Plan, steps to take prior to going to the ER and when to Call Home Health First:  Instructed patient and caregiver on importance of emergency care plan and when to call for emergent care with understanding verbalized.     Medication  issues/Concerns: Waiting for order for home 02 to keep SP02 above 89%    Additional Problems/Concerns: NA    SDOH Barriers (i.e. caregiver concerns, social isolation, transportation, food insecurity, environment, income etc.)/Need for MSW: NA

## 2024-12-24 ENCOUNTER — HOME CARE VISIT (OUTPATIENT)
Dept: HOME HEALTH SERVICES | Facility: CLINIC | Age: 74
End: 2024-12-24
Payer: MEDICARE

## 2024-12-24 PROCEDURE — G0151 HHCP-SERV OF PT,EA 15 MIN: HCPCS

## 2024-12-26 ENCOUNTER — HOME CARE VISIT (OUTPATIENT)
Dept: HOME HEALTH SERVICES | Facility: CLINIC | Age: 74
End: 2024-12-26
Payer: MEDICARE

## 2024-12-26 VITALS — RESPIRATION RATE: 17 BRPM

## 2024-12-26 PROCEDURE — G0299 HHS/HOSPICE OF RN EA 15 MIN: HCPCS

## 2024-12-27 VITALS
SYSTOLIC BLOOD PRESSURE: 132 MMHG | HEART RATE: 102 BPM | RESPIRATION RATE: 20 BRPM | DIASTOLIC BLOOD PRESSURE: 60 MMHG | TEMPERATURE: 97.3 F | BODY MASS INDEX: 29.64 KG/M2 | OXYGEN SATURATION: 94 % | WEIGHT: 206.6 LBS

## 2024-12-30 ENCOUNTER — HOME CARE VISIT (OUTPATIENT)
Dept: HOME HEALTH SERVICES | Facility: CLINIC | Age: 74
End: 2024-12-30
Payer: MEDICARE

## 2024-12-30 ENCOUNTER — READMISSION MANAGEMENT (OUTPATIENT)
Dept: CALL CENTER | Facility: HOSPITAL | Age: 74
End: 2024-12-30
Payer: MEDICARE

## 2024-12-30 VITALS
RESPIRATION RATE: 16 BRPM | OXYGEN SATURATION: 97 % | HEART RATE: 94 BPM | TEMPERATURE: 97.5 F | SYSTOLIC BLOOD PRESSURE: 140 MMHG | DIASTOLIC BLOOD PRESSURE: 50 MMHG

## 2024-12-30 PROCEDURE — G0157 HHC PT ASSISTANT EA 15: HCPCS

## 2024-12-30 PROCEDURE — G0300 HHS/HOSPICE OF LPN EA 15 MIN: HCPCS

## 2024-12-30 NOTE — OUTREACH NOTE
COPD/PN Week 1 Survey      Flowsheet Row Responses   [facility] patient discharged from? [place]   Does the patient have one of the following disease processes/diagnoses(primary or secondary)? Pneumonia   Week 1 attempt successful? Yes   Call start time 1540   Call end time 1542   Discharge diagnosis Pneumonia   Person spoke with today (if not patient) and relationship patient   Meds reviewed with patient/caregiver? Yes   Is the patient having any side effects they believe may be caused by any medication additions or changes? No   Does the patient have all medications ordered at discharge? Yes   Is the patient taking all medications as directed (includes completed medication regime)? Yes   Does the patient have a primary care provider?  Yes   Does the patient have an appointment with their PCP or specialist within 7 days of discharge? No   Comments regarding PCP [facility]   What is preventing the patient from scheduling follow up appointments within 7 days of discharge? Haven't had time   Has the patient kept scheduled appointments due by today? N/A   Pulse Ox monitoring Intermittent   Pulse Ox device source Patient   O2 Sat comments 93-95%   O2 Sat: education provided Sat levels, Monitoring frequency, When to seek care   Did the patient receive a copy of their discharge instructions? Yes   Nursing interventions Reviewed instructions with patient   What is the patient's perception of their health status since discharge? Improving   Is the patient/caregiver able to teach back the hierarchy of who to call/visit for symptoms/problems? PCP, Specialist, Home health nurse, Urgent Care, ED, 911 Yes   Patient reports what zone on this call? Green Zone   Green Zone Reports doing well, Breathing without shortness of breath, Slept well last night   Green Zone interventions: Take daily medications, Use oxygen as prescribed   Week 1 call completed? Yes   Graduated Yes   Is the patient interested in additional calls from an  ambulatory ? No   Would this patient benefit from a Referral to [facility] Social Work? No   Wrap up additional comments Pt doing well at this time.  he denies needs.   Call end time [id number]            KAY SAENZ - Registered Nurse

## 2024-12-31 VITALS
TEMPERATURE: 97.2 F | SYSTOLIC BLOOD PRESSURE: 126 MMHG | RESPIRATION RATE: 18 BRPM | OXYGEN SATURATION: 93 % | DIASTOLIC BLOOD PRESSURE: 64 MMHG | HEART RATE: 81 BPM

## 2025-01-02 ENCOUNTER — HOME CARE VISIT (OUTPATIENT)
Dept: HOME HEALTH SERVICES | Facility: CLINIC | Age: 75
End: 2025-01-02
Payer: MEDICARE

## 2025-01-02 VITALS
HEART RATE: 61 BPM | RESPIRATION RATE: 16 BRPM | SYSTOLIC BLOOD PRESSURE: 115 MMHG | OXYGEN SATURATION: 99 % | TEMPERATURE: 97.1 F | DIASTOLIC BLOOD PRESSURE: 64 MMHG

## 2025-01-02 VITALS
RESPIRATION RATE: 16 BRPM | OXYGEN SATURATION: 98 % | SYSTOLIC BLOOD PRESSURE: 114 MMHG | HEART RATE: 86 BPM | DIASTOLIC BLOOD PRESSURE: 60 MMHG | TEMPERATURE: 97.4 F

## 2025-01-02 PROCEDURE — G0157 HHC PT ASSISTANT EA 15: HCPCS

## 2025-01-02 PROCEDURE — G0153 HHCP-SVS OF S/L PATH,EA 15MN: HCPCS

## 2025-01-06 ENCOUNTER — HOME CARE VISIT (OUTPATIENT)
Dept: HOME HEALTH SERVICES | Facility: CLINIC | Age: 75
End: 2025-01-06
Payer: MEDICARE

## 2025-01-06 ENCOUNTER — HOME CARE VISIT (OUTPATIENT)
Dept: HOME HEALTH SERVICES | Facility: HOME HEALTHCARE | Age: 75
End: 2025-01-06
Payer: MEDICARE

## 2025-01-06 VITALS
DIASTOLIC BLOOD PRESSURE: 78 MMHG | OXYGEN SATURATION: 99 % | HEART RATE: 82 BPM | SYSTOLIC BLOOD PRESSURE: 124 MMHG | TEMPERATURE: 97.9 F | RESPIRATION RATE: 18 BRPM

## 2025-01-06 PROCEDURE — G0153 HHCP-SVS OF S/L PATH,EA 15MN: HCPCS

## 2025-01-07 ENCOUNTER — HOME CARE VISIT (OUTPATIENT)
Dept: HOME HEALTH SERVICES | Facility: CLINIC | Age: 75
End: 2025-01-07
Payer: MEDICARE

## 2025-01-07 VITALS
SYSTOLIC BLOOD PRESSURE: 142 MMHG | HEART RATE: 68 BPM | DIASTOLIC BLOOD PRESSURE: 78 MMHG | TEMPERATURE: 97.1 F | RESPIRATION RATE: 20 BRPM | OXYGEN SATURATION: 93 %

## 2025-01-07 VITALS
RESPIRATION RATE: 16 BRPM | DIASTOLIC BLOOD PRESSURE: 68 MMHG | OXYGEN SATURATION: 90 % | TEMPERATURE: 97.6 F | SYSTOLIC BLOOD PRESSURE: 110 MMHG | HEART RATE: 55 BPM

## 2025-01-07 PROCEDURE — G0493 RN CARE EA 15 MIN HH/HOSPICE: HCPCS

## 2025-01-07 PROCEDURE — G0157 HHC PT ASSISTANT EA 15: HCPCS

## 2025-01-08 ENCOUNTER — HOME CARE VISIT (OUTPATIENT)
Dept: HOME HEALTH SERVICES | Facility: CLINIC | Age: 75
End: 2025-01-08
Payer: MEDICARE

## 2025-01-08 VITALS
OXYGEN SATURATION: 95 % | HEART RATE: 88 BPM | DIASTOLIC BLOOD PRESSURE: 60 MMHG | TEMPERATURE: 97.5 F | SYSTOLIC BLOOD PRESSURE: 128 MMHG | RESPIRATION RATE: 16 BRPM

## 2025-01-08 VITALS
RESPIRATION RATE: 16 BRPM | DIASTOLIC BLOOD PRESSURE: 60 MMHG | TEMPERATURE: 98.1 F | OXYGEN SATURATION: 95 % | SYSTOLIC BLOOD PRESSURE: 128 MMHG

## 2025-01-08 PROCEDURE — G0153 HHCP-SVS OF S/L PATH,EA 15MN: HCPCS

## 2025-01-08 PROCEDURE — G0157 HHC PT ASSISTANT EA 15: HCPCS

## 2025-01-13 ENCOUNTER — HOME CARE VISIT (OUTPATIENT)
Dept: HOME HEALTH SERVICES | Facility: CLINIC | Age: 75
End: 2025-01-13
Payer: MEDICARE

## 2025-01-13 VITALS
OXYGEN SATURATION: 99 % | TEMPERATURE: 97.9 F | DIASTOLIC BLOOD PRESSURE: 80 MMHG | HEART RATE: 69 BPM | RESPIRATION RATE: 16 BRPM | SYSTOLIC BLOOD PRESSURE: 124 MMHG

## 2025-01-13 VITALS
HEART RATE: 69 BPM | DIASTOLIC BLOOD PRESSURE: 80 MMHG | OXYGEN SATURATION: 99 % | TEMPERATURE: 97.9 F | RESPIRATION RATE: 16 BRPM | SYSTOLIC BLOOD PRESSURE: 124 MMHG

## 2025-01-13 PROCEDURE — G0153 HHCP-SVS OF S/L PATH,EA 15MN: HCPCS

## 2025-01-13 PROCEDURE — G0157 HHC PT ASSISTANT EA 15: HCPCS

## 2025-01-14 ENCOUNTER — HOME CARE VISIT (OUTPATIENT)
Dept: HOME HEALTH SERVICES | Facility: CLINIC | Age: 75
End: 2025-01-14
Payer: MEDICARE

## 2025-01-14 VITALS
HEART RATE: 78 BPM | SYSTOLIC BLOOD PRESSURE: 136 MMHG | OXYGEN SATURATION: 98 % | RESPIRATION RATE: 18 BRPM | DIASTOLIC BLOOD PRESSURE: 80 MMHG | TEMPERATURE: 97.4 F

## 2025-01-14 PROCEDURE — G0299 HHS/HOSPICE OF RN EA 15 MIN: HCPCS

## 2025-01-15 ENCOUNTER — HOME CARE VISIT (OUTPATIENT)
Dept: HOME HEALTH SERVICES | Facility: CLINIC | Age: 75
End: 2025-01-15
Payer: MEDICARE

## 2025-01-15 VITALS
HEART RATE: 81 BPM | OXYGEN SATURATION: 99 % | SYSTOLIC BLOOD PRESSURE: 128 MMHG | RESPIRATION RATE: 16 BRPM | TEMPERATURE: 97.7 F | DIASTOLIC BLOOD PRESSURE: 68 MMHG

## 2025-01-15 VITALS
DIASTOLIC BLOOD PRESSURE: 60 MMHG | HEART RATE: 90 BPM | OXYGEN SATURATION: 95 % | SYSTOLIC BLOOD PRESSURE: 116 MMHG | RESPIRATION RATE: 17 BRPM

## 2025-01-15 PROCEDURE — G0153 HHCP-SVS OF S/L PATH,EA 15MN: HCPCS

## 2025-01-15 PROCEDURE — G0151 HHCP-SERV OF PT,EA 15 MIN: HCPCS

## 2025-01-20 ENCOUNTER — HOME CARE VISIT (OUTPATIENT)
Dept: HOME HEALTH SERVICES | Facility: CLINIC | Age: 75
End: 2025-01-20
Payer: MEDICARE

## 2025-01-20 VITALS
SYSTOLIC BLOOD PRESSURE: 122 MMHG | HEART RATE: 84 BPM | RESPIRATION RATE: 18 BRPM | DIASTOLIC BLOOD PRESSURE: 72 MMHG | TEMPERATURE: 97.8 F | OXYGEN SATURATION: 98 %

## 2025-01-20 PROCEDURE — G0153 HHCP-SVS OF S/L PATH,EA 15MN: HCPCS

## 2025-01-20 PROCEDURE — G0493 RN CARE EA 15 MIN HH/HOSPICE: HCPCS

## 2025-01-22 ENCOUNTER — HOME CARE VISIT (OUTPATIENT)
Dept: HOME HEALTH SERVICES | Facility: CLINIC | Age: 75
End: 2025-01-22
Payer: MEDICARE

## 2025-01-22 VITALS
DIASTOLIC BLOOD PRESSURE: 84 MMHG | HEART RATE: 84 BPM | TEMPERATURE: 98 F | SYSTOLIC BLOOD PRESSURE: 130 MMHG | OXYGEN SATURATION: 98 % | RESPIRATION RATE: 18 BRPM

## 2025-01-22 PROCEDURE — G0153 HHCP-SVS OF S/L PATH,EA 15MN: HCPCS

## 2025-01-22 NOTE — HOME HEALTH
SPEECH THERAPY DISCHARGE VISIT    SUBJECTIVE: PT. IS READY FOR ST VISIT AND SPOUSE IS PRESENT.  NO CHANGES OR COMPLAINTS VERBALIZED.    PAIN: 0/10    EDEMA:  NONE    WOUND / SKIN CONDITION:  INTACT    TODAY'S INTERVENTIONS / EDUCATION / PATIENT'S RESPONSE / GOAL STATUS: VOICE/BREATH SUPPORT TX; DYSPHAGIA TX; P.O. TRIALS; REVIEW GOALS AND PROGRESS;  REVIEW DYSPHAGIA HEP;  D/C FROM ST WITH GOALS MET AND PT. IS CURRENTLY TOLERATING REGULAR MODIFIED SOLIDS AND NECTAR THICK LIQUIDS WITH NO OVERT S/S OF ASPIRATION.    EXPLANATION OF UNMET GOALS  NONE    DISCHARGE STATUS     TO SELF  TO FAMILY     DISCHARGE CONDITION   GOOD    DISCHARGE REASON    GOALS MET       INSTRUCTIONS HOME PROGRAM PROVIDED TO [facility]          CONTENT:  SWALLOWING PRECAUTIONS, COMPENSATORY STRATEGIES; DIET MODIFICATION; DYSPHAGIA HEP;  VOICE/BREATH SUPPORT HEP          PATIENT CAREGIVER LEVEL OF COMPLIANCE:  EXCELLENT    UNMET NEEDS:  N/A    COMMUNICATION / CARE COORDINATION: D/C SUMMARY TO MD AND TEAM     PATIENT AND CAREGIVER (RIC) ARE AGREEABLE WITH DISCHARGE PLAN:  YES

## 2025-01-27 ENCOUNTER — HOME CARE VISIT (OUTPATIENT)
Dept: HOME HEALTH SERVICES | Facility: CLINIC | Age: 75
End: 2025-01-27
Payer: MEDICARE

## 2025-01-27 PROCEDURE — G0300 HHS/HOSPICE OF LPN EA 15 MIN: HCPCS

## 2025-01-28 VITALS
TEMPERATURE: 97.3 F | RESPIRATION RATE: 18 BRPM | SYSTOLIC BLOOD PRESSURE: 130 MMHG | OXYGEN SATURATION: 94 % | DIASTOLIC BLOOD PRESSURE: 66 MMHG | HEART RATE: 80 BPM

## 2025-02-03 ENCOUNTER — HOME CARE VISIT (OUTPATIENT)
Dept: HOME HEALTH SERVICES | Facility: CLINIC | Age: 75
End: 2025-02-03
Payer: MEDICARE

## 2025-02-03 VITALS
TEMPERATURE: 97.4 F | HEART RATE: 78 BPM | DIASTOLIC BLOOD PRESSURE: 78 MMHG | SYSTOLIC BLOOD PRESSURE: 152 MMHG | OXYGEN SATURATION: 90 % | RESPIRATION RATE: 18 BRPM

## 2025-02-03 PROCEDURE — G0493 RN CARE EA 15 MIN HH/HOSPICE: HCPCS

## 2025-02-10 ENCOUNTER — HOME CARE VISIT (OUTPATIENT)
Dept: HOME HEALTH SERVICES | Facility: CLINIC | Age: 75
End: 2025-02-10
Payer: MEDICARE

## 2025-02-10 VITALS
DIASTOLIC BLOOD PRESSURE: 74 MMHG | RESPIRATION RATE: 18 BRPM | OXYGEN SATURATION: 92 % | SYSTOLIC BLOOD PRESSURE: 132 MMHG | HEART RATE: 72 BPM | TEMPERATURE: 97.4 F

## 2025-02-10 PROCEDURE — G0493 RN CARE EA 15 MIN HH/HOSPICE: HCPCS

## 2025-02-10 PROCEDURE — G0162 HHC RN E&M PLAN SVS, 15 MIN: HCPCS

## (undated) DEVICE — SNAR POLYP SENSATION MICRO OVL 13 240X40

## (undated) DEVICE — TBG SMPL FLTR LINE NASL 02/C02 A/ BX/100

## (undated) DEVICE — MARKR SKIN W/RULR AND LBL

## (undated) DEVICE — MASK,OXYGEN,MED CONC,ADLT,7' TUB, UC: Brand: PENDING

## (undated) DEVICE — YANKAUER,BULB TIP WITH VENT: Brand: ARGYLE

## (undated) DEVICE — GLV SURG BIOGEL M LTX PF 7 1/2

## (undated) DEVICE — SPNG GZ WOVN 4X4IN 12PLY 10/BX STRL

## (undated) DEVICE — PK TURNOVER RM ADV

## (undated) DEVICE — Device: Brand: DEFENDO AIR/WATER/SUCTION AND BIOPSY VALVE

## (undated) DEVICE — CONMED SCOPE SAVER BITE BLOCK, 20X27 MM: Brand: SCOPE SAVER

## (undated) DEVICE — MAX-CORE® DISPOSABLE CORE BIOPSY INSTRUMENT, 18G X 25CM: Brand: MAX-CORE

## (undated) DEVICE — PAD,PREPPING,CUFFED,24X48,7",NONSTERILE: Brand: MEDLINE

## (undated) DEVICE — TOWEL,OR,DSP,ST,BLUE,STD,4/PK,20PK/CS: Brand: MEDLINE

## (undated) DEVICE — VAGINAL PREP TRAY: Brand: MEDLINE INDUSTRIES, INC.

## (undated) DEVICE — THE SINGLE USE ETRAP – POLYP TRAP IS USED FOR SUCTION RETRIEVAL OF ENDOSCOPICALLY REMOVED POLYPS.: Brand: ETRAP

## (undated) DEVICE — HLDR PROB URONAV

## (undated) DEVICE — CUFF,BP,DISP,1 TUBE,ADULT,HP: Brand: MEDLINE

## (undated) DEVICE — SENSR O2 OXIMAX FNGR A/ 18IN NONSTR

## (undated) DEVICE — THE CHANNEL CLEANING BRUSH IS A NYLON FLEXI BRUSH ATTACHED TO A FLEXIBLE PLASTIC SHEATH DESIGNED TO SAFELY REMOVE DEBRIS FROM FLEXIBLE ENDOSCOPES.